# Patient Record
Sex: FEMALE | Race: BLACK OR AFRICAN AMERICAN | NOT HISPANIC OR LATINO | Employment: FULL TIME | URBAN - METROPOLITAN AREA
[De-identification: names, ages, dates, MRNs, and addresses within clinical notes are randomized per-mention and may not be internally consistent; named-entity substitution may affect disease eponyms.]

---

## 2018-02-09 ENCOUNTER — INITIAL PRENATAL (OUTPATIENT)
Dept: OBSTETRICS AND GYNECOLOGY | Facility: CLINIC | Age: 38
End: 2018-02-09
Payer: COMMERCIAL

## 2018-02-09 ENCOUNTER — LAB VISIT (OUTPATIENT)
Dept: LAB | Facility: HOSPITAL | Age: 38
End: 2018-02-09
Attending: OBSTETRICS & GYNECOLOGY
Payer: COMMERCIAL

## 2018-02-09 VITALS
BODY MASS INDEX: 38.62 KG/M2 | DIASTOLIC BLOOD PRESSURE: 90 MMHG | WEIGHT: 218 LBS | SYSTOLIC BLOOD PRESSURE: 120 MMHG | HEART RATE: 62 BPM

## 2018-02-09 DIAGNOSIS — O99.210 MATERNAL OBESITY AFFECTING PREGNANCY, ANTEPARTUM: ICD-10-CM

## 2018-02-09 DIAGNOSIS — Z34.91 PREGNANCY WITH ONE FETUS IN FIRST TRIMESTER: ICD-10-CM

## 2018-02-09 DIAGNOSIS — O10.911 MATERNAL CHRONIC HYPERTENSION IN FIRST TRIMESTER: ICD-10-CM

## 2018-02-09 DIAGNOSIS — O09.521 ELDERLY MULTIGRAVIDA IN FIRST TRIMESTER: ICD-10-CM

## 2018-02-09 DIAGNOSIS — Z34.90 EARLY STAGE OF PREGNANCY: Primary | ICD-10-CM

## 2018-02-09 DIAGNOSIS — Z12.4 CERVICAL CANCER SCREENING: ICD-10-CM

## 2018-02-09 PROBLEM — O10.919 CHRONIC HYPERTENSION COMPLICATING OR REASON FOR CARE DURING PREGNANCY: Status: ACTIVE | Noted: 2018-02-09

## 2018-02-09 PROBLEM — O09.529 AMA (ADVANCED MATERNAL AGE) MULTIGRAVIDA 35+: Status: ACTIVE | Noted: 2018-02-09

## 2018-02-09 LAB
ABO + RH BLD: NORMAL
ALBUMIN SERPL BCP-MCNC: 4.1 G/DL
ALP SERPL-CCNC: 56 U/L
ALT SERPL W/O P-5'-P-CCNC: 13 U/L
ANION GAP SERPL CALC-SCNC: 10 MMOL/L
AST SERPL-CCNC: 17 U/L
BASOPHILS # BLD AUTO: 0.02 K/UL
BASOPHILS NFR BLD: 0.2 %
BILIRUB SERPL-MCNC: 0.6 MG/DL
BLD GP AB SCN CELLS X3 SERPL QL: NORMAL
BUN SERPL-MCNC: 7 MG/DL
CALCIUM SERPL-MCNC: 10.2 MG/DL
CHLORIDE SERPL-SCNC: 102 MMOL/L
CO2 SERPL-SCNC: 25 MMOL/L
CREAT SERPL-MCNC: 0.9 MG/DL
DIFFERENTIAL METHOD: ABNORMAL
EOSINOPHIL # BLD AUTO: 0.2 K/UL
EOSINOPHIL NFR BLD: 1.3 %
ERYTHROCYTE [DISTWIDTH] IN BLOOD BY AUTOMATED COUNT: 14.9 %
EST. GFR  (AFRICAN AMERICAN): >60 ML/MIN/1.73 M^2
EST. GFR  (NON AFRICAN AMERICAN): >60 ML/MIN/1.73 M^2
ESTIMATED AVG GLUCOSE: 88 MG/DL
GLUCOSE SERPL-MCNC: 105 MG/DL
HBA1C MFR BLD HPLC: 4.7 %
HCT VFR BLD AUTO: 35.8 %
HGB BLD-MCNC: 12.5 G/DL
LYMPHOCYTES # BLD AUTO: 2 K/UL
LYMPHOCYTES NFR BLD: 17.2 %
MCH RBC QN AUTO: 29.2 PG
MCHC RBC AUTO-ENTMCNC: 34.9 G/DL
MCV RBC AUTO: 84 FL
MONOCYTES # BLD AUTO: 1.2 K/UL
MONOCYTES NFR BLD: 9.9 %
NEUTROPHILS # BLD AUTO: 8.2 K/UL
NEUTROPHILS NFR BLD: 71.2 %
PLATELET # BLD AUTO: 318 K/UL
PMV BLD AUTO: 10.1 FL
POTASSIUM SERPL-SCNC: 3.8 MMOL/L
PROT SERPL-MCNC: 8.2 G/DL
RBC # BLD AUTO: 4.28 M/UL
SODIUM SERPL-SCNC: 137 MMOL/L
WBC # BLD AUTO: 11.57 K/UL

## 2018-02-09 PROCEDURE — 99999 PR PBB SHADOW E&M-EST. PATIENT-LVL III: CPT | Mod: PBBFAC,,, | Performed by: OBSTETRICS & GYNECOLOGY

## 2018-02-09 PROCEDURE — 86703 HIV-1/HIV-2 1 RESULT ANTBDY: CPT

## 2018-02-09 PROCEDURE — 85025 COMPLETE CBC W/AUTO DIFF WBC: CPT

## 2018-02-09 PROCEDURE — 86592 SYPHILIS TEST NON-TREP QUAL: CPT

## 2018-02-09 PROCEDURE — 0500F INITIAL PRENATAL CARE VISIT: CPT | Mod: S$GLB,,, | Performed by: OBSTETRICS & GYNECOLOGY

## 2018-02-09 PROCEDURE — 88175 CYTOPATH C/V AUTO FLUID REDO: CPT

## 2018-02-09 PROCEDURE — 83036 HEMOGLOBIN GLYCOSYLATED A1C: CPT

## 2018-02-09 PROCEDURE — 87491 CHLMYD TRACH DNA AMP PROBE: CPT

## 2018-02-09 PROCEDURE — 87086 URINE CULTURE/COLONY COUNT: CPT

## 2018-02-09 PROCEDURE — 86803 HEPATITIS C AB TEST: CPT

## 2018-02-09 PROCEDURE — 36415 COLL VENOUS BLD VENIPUNCTURE: CPT

## 2018-02-09 PROCEDURE — 80053 COMPREHEN METABOLIC PANEL: CPT

## 2018-02-09 PROCEDURE — 87624 HPV HI-RISK TYP POOLED RSLT: CPT

## 2018-02-09 PROCEDURE — 87340 HEPATITIS B SURFACE AG IA: CPT

## 2018-02-09 PROCEDURE — 83020 HEMOGLOBIN ELECTROPHORESIS: CPT

## 2018-02-09 PROCEDURE — 83020 HEMOGLOBIN ELECTROPHORESIS: CPT | Mod: 91

## 2018-02-09 PROCEDURE — 86762 RUBELLA ANTIBODY: CPT

## 2018-02-09 PROCEDURE — 86850 RBC ANTIBODY SCREEN: CPT

## 2018-02-09 NOTE — PROGRESS NOTES
[facility]  Ambulatory Clinic  Obstetrics & Gynecology    Visit Date:  2018     Chief Complaint:  Establish prenatal care    Dating Criteria:     LMP:  Patient's last menstrual period was 2017.  Working ELIAZAR:  10/6/2018, by Last Menstrual Period    History of Present Illness:      nAa Wharton is a 38 y.o.  at 5w6d gestation by LMP here to establish prenatal care.     Pt has not had any prenatal care for this pregnancy.     Pt has no major complaints today and denies any vaginal bleeding, leakage of fluid, contractions, or pain.     Medical history is remarkable for chronic hypertension, dx 30 y/o, currently ACEI/HCTZ and spironolactone.      Otherwise, pt is in her usual state of health.    Past Medical History:      Chronic HTN  Sickle cell trait  Congenital absent left ovary per pt    Past Surgical History:     Laparoscopic cholecystectomy     Medications:      Prenatal vitamins    Allergies:      Dilaudid - hives      Obstetric History:      G1, current    Gynecologic History:      Denies active STI  Denies history abnormal Pap     Social History:      Denies tobacco, alcohol or illicit drug use  Current partner is father of baby  Denies domestic abuse     Family History:       Denies congenital anomalies, inherited syndromes, fetal aneuploidy    Review of Systems:      Constitutional:  No fever, fatigue  HENT:  No congestion, hearing changes  Eyes:  No visual disturbance  Respiratory:  No cough, shortness of breath  Cardiovascular:  No chest pain, leg swelling  Breast:  No lump, pain, nipple discharge, redness, skin changes  Gastrointestinal:  No abdominal pain, constipation, blood in stool   Genitourinary:  No dysuria, frequency  Endocrine:  No heat or cold intolerance  Musculoskeletal:  No back pain, arthralgias  Skin:  No rash, jaundice  Neurological:  No dizziness, weakness, headaches  Psychiatric/Behavioral:  No sleep disturbance, dysphoric mood     Physical Exam:      BP (!) 120/90   Wt 98.9 kg (218 lb)   LMP 2017   BMI 38.62 kg/m²      GENERAL:  NAD. Well-nourished. A&Ox3.  HEENT:  NCAT, EOMI, PERRLA, moist mucus membranes.  Neck supple w/o masses.  BREAST:  Symmetric, no obvious masses, adenopathy, skin changes or nipple discharge.  LUNGS:  CTA-B.   HEART:  RRR, physiologic heart sounds.  ABDOMEN:  Soft, non-tender. Normoactive BS.  No obvious organomegaly.    EXT:  Symmetric w/o cramping, claudication, or edema. +2 distal pulses. FROM.  SKIN:  No rashes or bruising.  NEURO:  CN II - XII grossly intact bilaterally. +2 DTR.  PSYCH:  Mood & affect appropriate.       PELVIC:  Female external genitalia w/o any obvious lesions.  Adequate perineal body. Normal urethral meatus. No gross lymphadenopathy.     VAGINA:  Pink, moist, well-rugated.  Good support.  No obvious lesion.  No discharge noted.     CERVIX:  No cervical motion tenderness, discharge, or obvious lesions.  Closed, thick, posterior.  UTERUS:  Small, non-tender, normal contour.  ADNEXA:  No masses or tenderness.    RECTAL:  Declined.  No obvious external lesions.   WET PREP:  Negative    Chaperone present for exam.    Assessment:     1. 38 y.o.  at 5w6d by LMP here to establish prenatal care  2. Chronic hypertension, dx 30 y/o, currently ACEI/HCTZ and spironolactone  3. Advanced maternal age  4. Maternal obesity - Body mass index is 38.62 kg/m².    Plan:    Principles of prenatal care, weight gain goals, dietary/lifestyle modifications, pregnancy care instructions and precautions were discussed in detail.  Pt was provided literature regarding pregnancy, maternity care, and childbirth.  Continue prenatal vitamins.  SAB/ectopic precautions reviewed.  Initial OB labs today, pap, GC cx today    Effect of uncontrolled HTN on her pregnancy and parameters to call reviewed. Pt was advised to stop all HTN meds at this time.  Pt was advised that ACEI is contraindicated during pregnancy.  Will start  anti-hypertensive medications if her BP exceeds (150-160) mmHg systolic or (100-110) mmHg diastolic, pt was instructed to call office or go to L&D.  BP medication of choice will be either labetalol or procardia. Monitor home BP tid, BP log given to pt.   testing with twice weekly NSTs starting at ~32 weeks until delivery. Timing of delivery will depend on maternal/fetal status and BP control.  We discussed daily low dose ASA for prevention of preE ~14 wks.     Discussed advanced maternal age on the relationship between maternal age and genetic aneuploidy.  Pt is not interested in first trimester screening, cell free DNA testing, or genetic amniocentesis.  Pt states she would not terminate the pregnancy for any reasons.  Will refer pt to [facility] as pregnancy progresses.    Discussed implications of obesity on pregnancy including but are not limited to miscarriage, poor ultrasound visualization, increase in congenital malformations (especially NTD's and cardiac anomalies),  birth, ascending infections, gestational diabetes, hypertensive diseases of pregnancy, macrosomia, shoulder dystocia, cerebral palsy and surgical complications such as wound infections, dehiscence and thrombosis.  Encourage healthy lifestyle modifications.    Return in 4 weeks, or sooner as needed.  All questions answered, pt voiced understanding.      Ramon Dai MD

## 2018-02-10 LAB — RPR SER QL: NORMAL

## 2018-02-11 LAB
BACTERIA UR CULT: NORMAL
C TRACH DNA SPEC QL NAA+PROBE: NOT DETECTED
N GONORRHOEA DNA SPEC QL NAA+PROBE: NOT DETECTED

## 2018-02-12 LAB
HBV SURFACE AG SERPL QL IA: NEGATIVE
HCV AB SERPL QL IA: NEGATIVE
HIV 1+2 AB+HIV1 P24 AG SERPL QL IA: NEGATIVE
RUBV IGG SER-ACNC: 31.8 IU/ML
RUBV IGG SER-IMP: REACTIVE

## 2018-02-15 LAB
HPV16 AG SPEC QL: NEGATIVE
HPV16+18+H RISK 12 DNA CVX-IMP: NEGATIVE
HPV18 DNA SPEC QL NAA+PROBE: NEGATIVE

## 2018-02-19 LAB
HGB A2 MFR BLD HPLC: 2.8 %
HGB FRACT BLD ELPH PH6.0-IMP: NORMAL
HGB FRACT BLD ELPH-IMP: NORMAL
HGB FRACT BLD ELPH-IMP: NORMAL

## 2018-02-20 ENCOUNTER — HOSPITAL ENCOUNTER (EMERGENCY)
Facility: HOSPITAL | Age: 38
Discharge: HOME | End: 2018-02-20
Attending: EMERGENCY MEDICINE
Payer: COMMERCIAL

## 2018-02-20 VITALS
OXYGEN SATURATION: 100 % | WEIGHT: 211 LBS | SYSTOLIC BLOOD PRESSURE: 179 MMHG | TEMPERATURE: 98 F | BODY MASS INDEX: 39.84 KG/M2 | RESPIRATION RATE: 18 BRPM | HEART RATE: 70 BPM | HEIGHT: 61 IN | DIASTOLIC BLOOD PRESSURE: 90 MMHG

## 2018-02-20 DIAGNOSIS — O00.101 RIGHT TUBAL PREGNANCY WITHOUT INTRAUTERINE PREGNANCY: Primary | ICD-10-CM

## 2018-02-20 DIAGNOSIS — N93.9 VAGINAL BLEEDING: ICD-10-CM

## 2018-02-20 DIAGNOSIS — O00.90 ECTOPIC PREGNANCY, UNSPECIFIED LOCATION, UNSPECIFIED WHETHER INTRAUTERINE PREGNANCY PRESENT: Primary | ICD-10-CM

## 2018-02-20 LAB
ABO + RH BLD: NORMAL
ALBUMIN SERPL BCP-MCNC: 3.8 G/DL
ALP SERPL-CCNC: 61 U/L
ALT SERPL W/O P-5'-P-CCNC: 17 U/L
ANION GAP SERPL CALC-SCNC: 6 MMOL/L
AST SERPL-CCNC: 19 U/L
B-HCG UR QL: POSITIVE
BACTERIA GENITAL QL WET PREP: ABNORMAL
BASOPHILS # BLD AUTO: 0.04 K/UL
BASOPHILS NFR BLD: 0.4 %
BILIRUB SERPL-MCNC: 0.5 MG/DL
BILIRUB UR QL STRIP: NEGATIVE
BUN SERPL-MCNC: 9 MG/DL
CALCIUM SERPL-MCNC: 9.3 MG/DL
CHLORIDE SERPL-SCNC: 105 MMOL/L
CLARITY UR: CLEAR
CLUE CELLS VAG QL WET PREP: ABNORMAL
CO2 SERPL-SCNC: 24 MMOL/L
COLOR UR: YELLOW
CREAT SERPL-MCNC: 0.9 MG/DL
CTP QC/QA: YES
DIFFERENTIAL METHOD: ABNORMAL
EOSINOPHIL # BLD AUTO: 0.2 K/UL
EOSINOPHIL NFR BLD: 2.1 %
ERYTHROCYTE [DISTWIDTH] IN BLOOD BY AUTOMATED COUNT: 15.3 %
EST. GFR  (AFRICAN AMERICAN): >60 ML/MIN/1.73 M^2
EST. GFR  (NON AFRICAN AMERICAN): >60 ML/MIN/1.73 M^2
FILAMENT FUNGI VAG WET PREP-#/AREA: ABNORMAL
GLUCOSE SERPL-MCNC: 101 MG/DL
GLUCOSE UR QL STRIP: NEGATIVE
HCG INTACT+B SERPL-ACNC: NORMAL MIU/ML
HCT VFR BLD AUTO: 34.4 %
HGB BLD-MCNC: 12.3 G/DL
HGB UR QL STRIP: ABNORMAL
KETONES UR QL STRIP: NEGATIVE
LEUKOCYTE ESTERASE UR QL STRIP: ABNORMAL
LYMPHOCYTES # BLD AUTO: 2.4 K/UL
LYMPHOCYTES NFR BLD: 23.6 %
MCH RBC QN AUTO: 29.6 PG
MCHC RBC AUTO-ENTMCNC: 35.8 G/DL
MCV RBC AUTO: 83 FL
MICROSCOPIC COMMENT: ABNORMAL
MONOCYTES # BLD AUTO: 1.1 K/UL
MONOCYTES NFR BLD: 10.9 %
NEUTROPHILS # BLD AUTO: 6.4 K/UL
NEUTROPHILS NFR BLD: 63 %
NITRITE UR QL STRIP: NEGATIVE
PH UR STRIP: 6 [PH] (ref 5–8)
PLATELET # BLD AUTO: 278 K/UL
PMV BLD AUTO: 9.9 FL
POTASSIUM SERPL-SCNC: 4 MMOL/L
PROT SERPL-MCNC: 8 G/DL
PROT UR QL STRIP: NEGATIVE
RBC # BLD AUTO: 4.15 M/UL
RBC #/AREA URNS HPF: 12 /HPF (ref 0–4)
SODIUM SERPL-SCNC: 135 MMOL/L
SP GR UR STRIP: 1.01 (ref 1–1.03)
SPECIMEN SOURCE: ABNORMAL
SQUAMOUS #/AREA URNS HPF: ABNORMAL /HPF
T VAGINALIS GENITAL QL WET PREP: ABNORMAL
TRICHOMONAS UR QL MICRO: ABNORMAL
URN SPEC COLLECT METH UR: ABNORMAL
UROBILINOGEN UR STRIP-ACNC: NEGATIVE EU/DL
WBC # BLD AUTO: 10.26 K/UL
WBC #/AREA URNS HPF: 50 /HPF (ref 0–5)
WBC #/AREA VAG WET PREP: ABNORMAL
YEAST GENITAL QL WET PREP: ABNORMAL

## 2018-02-20 PROCEDURE — 63600175 PHARM REV CODE 636 W HCPCS: Performed by: OBSTETRICS & GYNECOLOGY

## 2018-02-20 PROCEDURE — 87210 SMEAR WET MOUNT SALINE/INK: CPT

## 2018-02-20 PROCEDURE — 96360 HYDRATION IV INFUSION INIT: CPT

## 2018-02-20 PROCEDURE — 99285 EMERGENCY DEPT VISIT HI MDM: CPT | Mod: 25

## 2018-02-20 PROCEDURE — 99243 OFF/OP CNSLTJ NEW/EST LOW 30: CPT | Mod: ,,, | Performed by: OBSTETRICS & GYNECOLOGY

## 2018-02-20 PROCEDURE — 85025 COMPLETE CBC W/AUTO DIFF WBC: CPT

## 2018-02-20 PROCEDURE — 96361 HYDRATE IV INFUSION ADD-ON: CPT

## 2018-02-20 PROCEDURE — 81025 URINE PREGNANCY TEST: CPT | Performed by: EMERGENCY MEDICINE

## 2018-02-20 PROCEDURE — 80053 COMPREHEN METABOLIC PANEL: CPT

## 2018-02-20 PROCEDURE — 25000003 PHARM REV CODE 250: Performed by: EMERGENCY MEDICINE

## 2018-02-20 PROCEDURE — 81000 URINALYSIS NONAUTO W/SCOPE: CPT

## 2018-02-20 PROCEDURE — 84702 CHORIONIC GONADOTROPIN TEST: CPT

## 2018-02-20 PROCEDURE — 86901 BLOOD TYPING SEROLOGIC RH(D): CPT

## 2018-02-20 PROCEDURE — 87491 CHLMYD TRACH DNA AMP PROBE: CPT

## 2018-02-20 PROCEDURE — 96372 THER/PROPH/DIAG INJ SC/IM: CPT

## 2018-02-20 RX ORDER — METHOTREXATE 25 MG/ML
50 INJECTION INTRA-ARTERIAL; INTRAMUSCULAR; INTRATHECAL; INTRAVENOUS ONCE
Status: COMPLETED | OUTPATIENT
Start: 2018-02-20 | End: 2018-02-20

## 2018-02-20 RX ORDER — METRONIDAZOLE 500 MG/1
2000 TABLET ORAL
Status: COMPLETED | OUTPATIENT
Start: 2018-02-20 | End: 2018-02-20

## 2018-02-20 RX ADMIN — METHOTREXATE 100 MG: 25 INJECTION, SOLUTION INTRA-ARTERIAL; INTRAMUSCULAR; INTRAVENOUS at 03:02

## 2018-02-20 RX ADMIN — METRONIDAZOLE 2000 MG: 500 TABLET ORAL at 03:02

## 2018-02-20 RX ADMIN — SODIUM CHLORIDE 1000 ML: 0.9 INJECTION, SOLUTION INTRAVENOUS at 07:02

## 2018-02-20 NOTE — CONSULTS
"[facility]  History & Physical  Obstetrics & Gynecology       Patient Name:  Ana Wharton  MRN:  [id number]  Admission Date:  2018  Hospital Length of Stay:  0  Attending Physician:  Ramon Dai MD  Primary Care Provider:  Son JOSÉ LUIS MD Malaika    Date:  2018    Reason for Consult:  Ectopic pregnancy    Referring Physician:  Dr. Beaulieu ([facility])    History of Present Illness:      Ana Wharton is a 38 y.o.  who in early pregnancy who present to ED with light vaginal bleeding.  Pelvic ordered showed a unruptured right ectopic pregnancy at 6w0d.  Pt was seen in clinic on 2018 for initial OB visit.  Pt denies any abdominal pain.    Past Medical History:      Chronic HTN  Sickle cell trait  Congenital absent left ovary per pt     Past Surgical History:      Laparoscopic cholecystectomy      Medications:      Prenatal vitamins     Allergies:      Dilaudid - hives       Obstetric History:       G1, current     Gynecologic History:      Denies active STI  Denies history abnormal Pap     Social History:       Denies tobacco, alcohol or illicit drug use  Current partner is father of baby  Denies domestic abuse     Family History:       Denies congenital anomalies, inherited syndromes, fetal aneuploidy    Vitals:     BP (!) 179/90 (BP Location: Right arm, Patient Position: Sitting)   Pulse 70   Temp 97.7 °F (36.5 °C) (Oral)   Resp 18   Ht 5' 1" (1.549 m)   Wt 95.7 kg (211 lb)   LMP 2017   SpO2 100%   BMI 39.87 kg/m²     Consults     Review of Systems   Constitutional: Negative.    HENT: Negative.    Eyes: Negative.    Respiratory: Negative.    Cardiovascular: Negative.    Gastrointestinal: Negative.    Endocrine: Negative.    Genitourinary: Negative.    Musculoskeletal: Negative.    Skin:  Negative.   Neurological: Negative.    Hematological: Negative.    Psychiatric/Behavioral: Negative.    Breast: negative.       Physical Exam:   Constitutional: No distress.      "                        Alert and oriented to person, place and time.  HEENT:  Normocephalic, atraumatic, anicteric, EOMI, moist mucus membranes.  Neck supple without masses.  LUNGS:  Clear to auscultation bilaterally.  HEART:  Regular rate & rhythm with physiologic heart sounds.  ABDOMEN:  Soft, non tender without any guarding, rigidity or rebound. Normoactive bowel sounds.  PELVIC:  Normal female external genitalia without gross lesions, rashes or excoriations. No gross vaginal or cervical lesions.  Adequate pelvis.  Cervix:  Cervix closed, thick, high, posterior.  T  EXTREMITIES:  Symmetric without cramping, claudication. Trace edema LE. +2 distal pulses.  Full range of motion.  SKIN:  No rashes, good turgor & capillary refill.  NEUROLOGIC:  Grossly intact bilaterally. +2 DTR, symmetric.  PSYCH:  Mood & affect appropriate.       Labs:  Recent Results (from the past 336 hour(s))   CBC W/ AUTO DIFFERENTIAL    Collection Time: 02/20/18  7:41 AM   Result Value Ref Range    WBC 10.26 3.90 - 12.70 K/uL    Hemoglobin 12.3 12.0 - 16.0 g/dL    Hematocrit 34.4 (L) 37.0 - 48.5 %    Platelets 278 150 - 350 K/uL   CBC auto differential    Collection Time: 02/09/18 10:54 AM   Result Value Ref Range    WBC 11.57 3.90 - 12.70 K/uL    Hemoglobin 12.5 12.0 - 16.0 g/dL    Hematocrit 35.8 (L) 37.0 - 48.5 %    Platelets 318 150 - 350 K/uL     BMP  Lab Results   Component Value Date     (L) 02/20/2018    K 4.0 02/20/2018     02/20/2018    CO2 24 02/20/2018    BUN 9 02/20/2018    CREATININE 0.9 02/20/2018    CALCIUM 9.3 02/20/2018    ANIONGAP 6 (L) 02/20/2018    ESTGFRAFRICA >60 02/20/2018    EGFRNONAA >60 02/20/2018     Lab Results   Component Value Date    ALT 17 02/20/2018    AST 19 02/20/2018    ALKPHOS 61 02/20/2018    BILITOT 0.5 02/20/2018     HCG   Component      Latest Ref Rng & Units 2/20/2018   hCG Quant      See Text mIU/mL [id number]     Ultrasound:  2/20/18    Results: Transabdominal followed by a transvaginal  ultrasound of the pelvis obtained.  The uterus measures 8.6 x 5.9 x 5.5 cm.  No intrauterine gestational sac seen.  There is a small gestational sac within the right adnexa with a yolk sac and a fetal pole, fetal heart rate of 136 beats/ minute.  Crown-rump length measuring 3.2 mm corresponding to 6 weeks and 0 day pregnancy.  Mean sac diameter 1.24 cm.  This is an ectopic pregnancy.  The right ovary is well-seen and separate from the gestational sac, it measures 4.1 x 4.2 x 3.2 cm with a small corpus luteal cyst seen.  The left ovary is not seen. There is flow to the right ovary.  No free fluid.    Assessment:     1. 38 y.o.  with right ectopic pregnancy at 6w0d  2. Hemodynamically stable, no evidence of ruptured ectopic  3. Pt desires conservative medical management with methotrexate  4. Congenital absent left ovary per pt  5. Trichomonal vaginalis  6. Chronic HTN    Plan:    I d/w with pt her right ectopic pregnancy in great detail including various mgt options (medical mgt with methotrexate versus surgical mgt with laparoscopy).  After an extensive discussion, pt request conservative medical with methotrexate due to her h/o congenital absent left ovary.  Pt was clearly informed that surgical mgt with laparoscopy (salpingostomy vs salpingectomy) may be needed if medical mgt with methotrexate is unsuccessful or if pt develops si/sxs of ruptured ectopic pregnancy.  Risks, benefits, and alternatives to methotrexate reviewed.  Pt was given clear ectopic pregnancy precautions and parameters to return to ER if she develops abdominal pain.  Importance of timely outpt follow up with serial HCG +/- additional treatment with methotrexate discussed.  A repeat HCG level will drawn on day 4 after initiation of methotrexate.  If the HCG level does not decrease by 15%, a second dose of methotrexate will be given.  I d/w pt the need for surgical mgt at any time if pt develops sxs concerning for ruptured ectopic pregnancy or  if pt is no longer a candidate for outpt medical mgt.  Pelvic rest.  Importance f/u strongly advised.    Flagyl 2000 mg po x 1 for trichomonas.  No alcohol while on antibx.  Hygiene advice.  Pt advised to inform partner(s) to see STI testing and treatment.     Pt was advised to resume her home BP regimen.  HTN precautions reviewed.      All questions answered, voiced understanding.      Ramon Dai MD

## 2018-02-20 NOTE — ED PROVIDER NOTES
"Encounter Date: 2/20/2018    SCRIBE #1 NOTE: I, Juanito Mart, am scribing for, and in the presence of,  Shu Iqbal PA-C. I have scribed the following portions of the note - Other sections scribed: HPI and ROS.       History     Chief Complaint   Patient presents with    Vaginal Bleeding     pt reports being 4 weeks pregnant and when she wiped after using the restroom the past 2 mornings she had "alot" of blood; pt denies soaking through pads and just reports that it was bloody only when she wiped;     CC: Vaginal Bleeding    HPI: Patient is a 38 y.o. gravid female with history of HTN who presents to ED c/o vaginal bleeding with associated nausea. Patient reports that she detected blood in her urine and on the tissue when she used the bathroom this morning. The blood was bright red. No clots reported. This is the patient's first pregnancy. Denies abdominal pain, chest pain, and dysuria. No further complaints.    Patient's OB/GYN is Dr. Ramon Dai.      The history is provided by the patient. No  was used.     Review of patient's allergies indicates:   Allergen Reactions    Dilaudid [hydromorphone (bulk)] Hives     Past Medical History:   Diagnosis Date    Hypertension      Past Surgical History:   Procedure Laterality Date    CHOLECYSTECTOMY      GALLBLADDER SURGERY       Family History   Problem Relation Age of Onset    Heart disease Mother     Diabetes Mother     Kidney disease Mother      Social History   Substance Use Topics    Smoking status: Never Smoker    Smokeless tobacco: Never Used    Alcohol use No      Comment: socially     Review of Systems   Constitutional: Negative for diaphoresis and fever.   HENT: Negative for sore throat.    Eyes: Negative for visual disturbance.   Respiratory: Negative for shortness of breath.    Cardiovascular: Negative for chest pain.   Gastrointestinal: Positive for nausea. Negative for abdominal pain, diarrhea and vomiting. "   Genitourinary: Positive for vaginal bleeding. Negative for dysuria.   Musculoskeletal: Negative for back pain.   Skin: Negative for rash.   Neurological: Negative for weakness and headaches.   Hematological: Does not bruise/bleed easily.       Physical Exam     Initial Vitals [02/20/18 0649]   BP Pulse Resp Temp SpO2   (!) 132/92 78 16 98.3 °F (36.8 °C) 100 %      MAP       105.33         Physical Exam    Nursing note and vitals reviewed.  Constitutional: She appears well-developed and well-nourished. She is not diaphoretic. No distress.   HENT:   Head: Normocephalic and atraumatic.   Nose: Nose normal.   Eyes: EOM are normal. Pupils are equal, round, and reactive to light.   Neck: Normal range of motion. Neck supple.   Cardiovascular: Normal rate and regular rhythm.   No murmur heard.  Pulmonary/Chest: Breath sounds normal. No respiratory distress. She has no wheezes. She has no rhonchi. She has no rales.   Abdominal: Soft. Bowel sounds are normal. She exhibits no distension. There is no tenderness. There is no rebound and no guarding.   Genitourinary: Vagina normal.   Genitourinary Comments: The cervix is closed.  There is a scant amount of blood on the specimen q-tip.  Otherwise, no blood noted in the vaginal vault.  No external genital lesions noted.  There is no cervical motion or adnexal tenderness to palpation.   Musculoskeletal: Normal range of motion.   Neurological: She is alert and oriented to person, place, and time. No cranial nerve deficit.   Skin: Skin is warm. No rash noted. No erythema.         ED Course   Procedures  Labs Reviewed   CBC W/ AUTO DIFFERENTIAL - Abnormal; Notable for the following:        Result Value    Hematocrit 34.4 (*)     RDW 15.3 (*)     Mono # 1.1 (*)     All other components within normal limits   COMPREHENSIVE METABOLIC PANEL - Abnormal; Notable for the following:     Sodium 135 (*)     Anion Gap 6 (*)     All other components within normal limits   URINALYSIS - Abnormal;  Notable for the following:     Occult Blood UA 3+ (*)     Leukocytes, UA 3+ (*)     All other components within normal limits   VAGINAL SCREEN - Abnormal; Notable for the following:     Trichomonas Moderate (*)     Clue Cells, Wet Prep Few (*)     WBC - Vaginal Screen Few (*)     All other components within normal limits   URINALYSIS MICROSCOPIC - Abnormal; Notable for the following:     RBC, UA 12 (*)     WBC, UA 50 (*)     Trichomonas, UA Few (*)     All other components within normal limits   POCT URINE PREGNANCY - Abnormal; Notable for the following:     POC Preg Test, Ur Positive (*)     All other components within normal limits   C. TRACHOMATIS/N. GONORRHOEAE BY AMP DNA   HCG, QUANTITATIVE, PREGNANCY   GROUP & RH          X-Rays:   Independently Interpreted Readings:   Other Readings:  Transvaginal ultrasound revealed a right adnexal ectopic pregnancy.    Medical Decision Making:   Differential Diagnosis:   This is an emergent evaluation of a 38-year-old female presents to the emergency department complaining of vaginal bleeding.  She states she is currently pregnant.    The patient is currently afebrile and nontoxic in appearance.  Her vital signs are stable.  On physical exam, there is no abdominal tenderness that could be elicited.  There is no rebound or guarding.  The patient appears comfortable.  On pelvic exam, the cervix is closed.  There is a scant amount of blood in the vaginal vault.  Otherwise there is no active bleeding noted from the cervical os.  There is no external genital lesions noted.  There is no cervical motion or adnexal tenderness to palpation.    An IV was started and fluids are given.  Blood was drawn and urine was collected.  A CBC is without leukocytosis or anemia.  CMP is unremarkable.  Beta hCG was noted to be 13,000.  The patient is O+.  RhoGAM is not needed this time.  Urine pregnancy test was positive and rapid urinalysis was remarkable for 3+ leukocytes.  Microscopic UA  revealed few Trichomonas.  Wet prep also reviewed moderate Trichomonas.  Gen-Probe for gonorrhea and chlamydia was sent and is pending.  A transvaginal ultrasound was performed which revealed a right adnexal ectopic pregnancy.  No rupture was noted.  At that time, Dr. Dai was contacted.  He came to the emergency department to see the patient and administered methotrexate.  She was also given Flagyl 2 g in the emergency department for trichomonas.  She will need to follow-up with Dr. Dai in one week for more medication and follow-up.  Signs and symptoms of worsening were thoroughly reviewed with her and she verbalized understanding and agreement.  She is currently safe and stable for discharge at this time.  I carefully considered but doubt ovarian torsion, tubo-ovarian abscess.  This case was discussed with Dr. Beaulieu and he is in agreement with the assessment and treatment plan.            Scribe Attestation:   Scribe #1: I performed the above scribed service and the documentation accurately describes the services I performed. I attest to the accuracy of the note.    Attending Attestation:           Physician Attestation for Scribe:  Physician Attestation Statement for Scribe #1: I, Shu Iqbal PA-C, reviewed documentation, as scribed by Juanito Mart in my presence, and it is both accurate and complete.                    Clinical Impression:   The primary encounter diagnosis was Ectopic pregnancy, unspecified location, unspecified whether intrauterine pregnancy present. A diagnosis of Vaginal bleeding was also pertinent to this visit.    Disposition:   Disposition: Discharged  Condition: Stable                        Shu Iqbal PA-C  02/20/18 1803       Shu Iqbal PA-C  02/20/18 1803

## 2018-02-20 NOTE — DISCHARGE INSTRUCTIONS
Methotrexate to Treat an Ectopic Pregnancy  An ectopic pregnancy is when a fertilized egg implants outside the womb (uterus). In most cases, it implants in a fallopian tube. This is a tube that goes from the uterus to an ovary. When this happens, the embryo cant grow normally. In some cases, it may stop growing quickly. Or it may grow until the fallopian tube tears (ruptures). This can cause severe bleeding and a risk for death for the mother.  Methotrexate is a medicine that stops the embryo from growing. The tissue is then absorbed by the mothers body. This treatment can prevent the rupture, bleeding, and risk of death to the mother. Methotrexate is often used instead of surgery to remove the fetus. Surgery has risks, like bleeding, infection, scarring of the fallopian tube, infertility, and the risks of anesthesia.  Having methotrexate treatment  Methotrexate is most often given by a shot (injection) into a muscle. It can also be given through an IV.  After having the shot, you may have:  · Mild abdominal pain or cramping  · Nausea and vomiting  · Diarrhea  · Fatigue  Care at home  Once you are home, you can resume normal activities as you are able. You will have some bleeding and pain. While you are recovering, you can use acetaminophen for pain if advised by your healthcare provider.  Until your healthcare provider says its OK, do NOT:  · Take anti-inflammatory medicines (NSAIDs), like aspirin, ibuprofen, or naproxen  · Have foods or vitamins that contain folic acid or folate (for example, prenatal vitamins have folate)  · Drink alcohol  · Take penicillin or certain other antibiotics  · Use tampons or douche  · Have sexual intercourse  Make sure to:  · Avoid the sun during the first week after your shot. Sun can cause a rash during this time.  · Use birth control for at least 3 months after treatment.  · Talk with a counselor if you feel sadness or grief after pregnancy loss.  Follow up  You will have  blood tests several times in the weeks after you have the shot. This is to make sure that your pregnancy hormone (HCG) level is getting lower. This shows that the fetus is no longer growing. It may take up to 4 weeks for your level to drop to zero. Most women need only one shot. If HCG levels are not low enough, your healthcare provider may give you a second shot. In some cases, this treatment does not work and surgery is needed. Your healthcare provider can tell you more about surgery for ectopic pregnancy.     When to call the healthcare provider  Call your healthcare provider if you have:  · Lower abdominal pain that gets worse or doesnt go away  · Heavy vaginal bleeding  · Nausea or vomiting that needs treatment  · Dizziness, weakness, or fainting  · Fever of 100.4°F (38°C) or higher   Date Last Reviewed: 6/1/2016 © 2000-2017 [employer]. [address] [place] [address]. All rights reserved. This information is not intended as a substitute for professional medical care. Always follow your healthcare professional's instructions.        Abdominal Pain and Early Pregnancy    (To rule out ectopic pregnancy or miscarriage)  Our tests show that you are pregnant, but the exact cause of your pain isnt clear.  Some pain and bleeding are common early in pregnancy. Often they stop, and you can go on to have a normal pregnancy and baby. Other times the pain or bleeding can be signs of a miscarriage or ectopic pregnancy. An ectopic pregnancy is a very serious problem. At this time it is unclear if your pregnancy will continue normally, if you will have a miscarriage, or if you could have an ectopic pregnancy. Below is some information about this.  Miscarriage  At this time we dont know whether you will have a miscarriage, or if things will clear up and your pregnancy will continue normally. We understand that this is emotionally difficult. There is little we can say to change the way you feel.  But understand that miscarriages are common.  About 1 or 2 out of every 10 pregnancies end this way. Some end even before you know you are pregnant. This happens for a number of reasons, and usually we never figure out why. Its important you know that it is not your fault. It didnt happen because you did anything wrong.  Having sex or exercising does not cause a miscarriage. These activities are usually safe unless you have pain or bleeding or your doctor tells you to stop. Even minor falls wont cause a miscarriage. Miscarriages happen because things were not developing as they were supposed to. No medicine can prevent a miscarriage.  Ectopic pregnancy  In a normal pregnancy, the fertilized egg attaches to the wall of the womb (uterus). In an ectopic or tubal pregnancy, the fertilized egg attaches outside the uterus, usually in the fallopian tube. Very rarely, the egg attaches to an ovary or somewhere else in the abdomen. An ectopic pregnancy is much less common than a miscarriage, but it is very serious. The baby cannot survive, and as it grows it can rupture the tube. This can cause internal bleeding and even death. Risk factors for an ectopic are:  · An ectopic pregnancy in the past  · Pelvic inflammatory disease, or PID  · Endometriosis  · Smoking  · An IUD  Additional tests  Because we dont know whats causing your symptoms, you will need more tests to figure out what the problem is. You may need:  Ultrasound  An ultrasound can usually find a normal pregnancy as early as 4 to 5 weeks along. If the ultrasound does not show the baby inside the uterus, it means that:  · You have a normal pregnancy less than 4 weeks along, or  · You are having or recently had a miscarriage, or  · You have an ectopic pregnancy  Quantitative HCG  This test measures the amount of a pregnancy hormone in your blood. Comparing today's test result to a repeat test in 2 days will show whether you have a normal  pregnancy.  Laparoscopy  This is a type of surgery. The healthcare provider will put a tube with a light inside your belly (abdomen) to look directly at your pelvic organs. This test is used when it is not safe to wait 2 days for blood test results.  Important information  If you do have an ectopic pregnancy, there is a small chance that the growing fetus can tear the fallopian tube. This can cause severe internal bleeding. If this happens, you may have:  · Sudden severe pain in your lower abdomen  · Vaginal bleeding  · Weakness, dizziness, and sometimes fainting  If any of these symptoms occur:  · Call 911 or return immediately to the hospital.  · Do not drive yourself.  · Do not go to your healthcare provider's office or to a clinic. Go to the hospital.   Home care  Follow these guidelines to help care for yourself at home:  · Rest until your next exam. Dont do anything strenuous.  · Eat a light diet with foods that are easy to digest.  · Dont have sex until your healthcare provider says its OK.  Follow-up care  Follow up with your healthcare provider, or as advised. If you were told to have a repeat blood test in 2 days, its important to get it done.  If you had an X-ray or ultrasound, a radiologist will review it. You will be told of any new findings that may affect your care.  Call 911  Call 911 if you have any of these:  · Severe pain and very heavy bleeding  · Severe lightheadedness, passing out, or fainting  · Rapid heart rate  · Trouble breathing  · Confused or difficulty waking up  When to seek medical advice  Call your healthcare provider right away if any of these occur:  · The pain in your abdomen gets worse, either suddenly or gradually.  · You are dizzy or weak when you stand.  · You have heavy vaginal bleeding. This means soaking 1 pad an hour for 3 hours.  · You have vaginal bleeding for more than 5 days.  · You have repeated vomiting or diarrhea.  · The pain in your abdomen moves to the lower  right.  · You have blood in your vomit or bowel movements. This will be dark red or black.  · You have a fever of 100.4ºF (38ºC) or higher, or as directed by your healthcare provider  Date Last Reviewed: 10/1/2016  © 5072-7807 [employer]. [address] [place] [id number]. All rights reserved. This information is not intended as a substitute for professional medical care. Always follow your healthcare professional's instructions.

## 2018-02-20 NOTE — ED TRIAGE NOTES
Pt reports sheis 4 weeks pregnant with c/o vaginal bleeding x 4 days. Pt denies dysuria, denies abdominal pain.

## 2018-02-21 ENCOUNTER — TELEPHONE (OUTPATIENT)
Dept: OBSTETRICS AND GYNECOLOGY | Facility: CLINIC | Age: 38
End: 2018-02-21

## 2018-02-21 LAB
C TRACH DNA SPEC QL NAA+PROBE: NOT DETECTED
N GONORRHOEA DNA SPEC QL NAA+PROBE: NOT DETECTED

## 2018-02-21 NOTE — TELEPHONE ENCOUNTER
----- Message from Richelle Ash sent at 2/21/2018 11:52 AM CST -----  Contact: self  Patient states she was advised to come in Friday 2/23/18 at 1:00pm to check hormone levels after miscarriage, but there are no appointments that day. She can be reached at [phone]. Thank you!

## 2018-02-21 NOTE — TELEPHONE ENCOUNTER
LM on pt Vm to call the office regarding hormone levels.    *Order was put in for her HCG levels and she need to go to the hospital lab on tomorrow at anytime to get it drawn. Will follow up with Dr. Dai when she need to be seen.* kt

## 2018-02-23 ENCOUNTER — ANESTHESIA (OUTPATIENT)
Dept: SURGERY | Facility: HOSPITAL | Age: 38
End: 2018-02-23
Payer: COMMERCIAL

## 2018-02-23 ENCOUNTER — LAB VISIT (OUTPATIENT)
Dept: LAB | Facility: HOSPITAL | Age: 38
End: 2018-02-23
Attending: OBSTETRICS & GYNECOLOGY
Payer: COMMERCIAL

## 2018-02-23 ENCOUNTER — OFFICE VISIT (OUTPATIENT)
Dept: OBSTETRICS AND GYNECOLOGY | Facility: CLINIC | Age: 38
End: 2018-02-23
Payer: COMMERCIAL

## 2018-02-23 ENCOUNTER — ANESTHESIA EVENT (OUTPATIENT)
Dept: SURGERY | Facility: HOSPITAL | Age: 38
End: 2018-02-23
Payer: COMMERCIAL

## 2018-02-23 ENCOUNTER — HOSPITAL ENCOUNTER (OUTPATIENT)
Facility: HOSPITAL | Age: 38
Discharge: HOME | End: 2018-02-24
Attending: EMERGENCY MEDICINE | Admitting: OBSTETRICS & GYNECOLOGY
Payer: COMMERCIAL

## 2018-02-23 ENCOUNTER — SURGERY (OUTPATIENT)
Age: 38
End: 2018-02-23

## 2018-02-23 VITALS
HEIGHT: 61 IN | WEIGHT: 218.25 LBS | BODY MASS INDEX: 41.21 KG/M2 | DIASTOLIC BLOOD PRESSURE: 82 MMHG | SYSTOLIC BLOOD PRESSURE: 130 MMHG

## 2018-02-23 DIAGNOSIS — Z90.79 STATUS POST BILATERAL SALPINGECTOMY: Primary | ICD-10-CM

## 2018-02-23 DIAGNOSIS — O00.101 RIGHT TUBAL PREGNANCY WITHOUT INTRAUTERINE PREGNANCY: ICD-10-CM

## 2018-02-23 DIAGNOSIS — O00.101 RIGHT TUBAL PREGNANCY WITHOUT INTRAUTERINE PREGNANCY: Primary | ICD-10-CM

## 2018-02-23 DIAGNOSIS — O00.00 ABDOMINAL PREGNANCY WITHOUT INTRAUTERINE PREGNANCY: ICD-10-CM

## 2018-02-23 DIAGNOSIS — O00.109 ECTOPIC PREGNANCY, TUBAL: ICD-10-CM

## 2018-02-23 LAB
ABO + RH BLD: NORMAL
ALBUMIN SERPL BCP-MCNC: 3.5 G/DL
ALP SERPL-CCNC: 58 U/L
ALT SERPL W/O P-5'-P-CCNC: 17 U/L
ANION GAP SERPL CALC-SCNC: 2 MMOL/L
AST SERPL-CCNC: 14 U/L
BASOPHILS # BLD AUTO: 0.01 K/UL
BASOPHILS NFR BLD: 0.1 %
BILIRUB SERPL-MCNC: 0.3 MG/DL
BLD GP AB SCN CELLS X3 SERPL QL: NORMAL
BUN SERPL-MCNC: 8 MG/DL
CALCIUM SERPL-MCNC: 9.2 MG/DL
CHLORIDE SERPL-SCNC: 106 MMOL/L
CO2 SERPL-SCNC: 28 MMOL/L
CREAT SERPL-MCNC: 0.8 MG/DL
DIFFERENTIAL METHOD: ABNORMAL
EOSINOPHIL # BLD AUTO: 0.2 K/UL
EOSINOPHIL NFR BLD: 1.6 %
ERYTHROCYTE [DISTWIDTH] IN BLOOD BY AUTOMATED COUNT: 14.9 %
EST. GFR  (AFRICAN AMERICAN): >60 ML/MIN/1.73 M^2
EST. GFR  (NON AFRICAN AMERICAN): >60 ML/MIN/1.73 M^2
GLUCOSE SERPL-MCNC: 129 MG/DL
HCG INTACT+B SERPL-ACNC: NORMAL MIU/ML
HCT VFR BLD AUTO: 33.9 %
HGB BLD-MCNC: 11.5 G/DL
LYMPHOCYTES # BLD AUTO: 2 K/UL
LYMPHOCYTES NFR BLD: 19.9 %
MCH RBC QN AUTO: 29.1 PG
MCHC RBC AUTO-ENTMCNC: 33.9 G/DL
MCV RBC AUTO: 86 FL
MONOCYTES # BLD AUTO: 0.7 K/UL
MONOCYTES NFR BLD: 6.6 %
NEUTROPHILS # BLD AUTO: 7.1 K/UL
NEUTROPHILS NFR BLD: 71.5 %
PLATELET # BLD AUTO: 322 K/UL
PMV BLD AUTO: 9.6 FL
POTASSIUM SERPL-SCNC: 4.1 MMOL/L
PROT SERPL-MCNC: 6.9 G/DL
RBC # BLD AUTO: 3.95 M/UL
SODIUM SERPL-SCNC: 136 MMOL/L
WBC # BLD AUTO: 9.9 K/UL

## 2018-02-23 PROCEDURE — 36415 COLL VENOUS BLD VENIPUNCTURE: CPT

## 2018-02-23 PROCEDURE — G0378 HOSPITAL OBSERVATION PER HR: HCPCS

## 2018-02-23 PROCEDURE — 63600175 PHARM REV CODE 636 W HCPCS: Performed by: ANESTHESIOLOGY

## 2018-02-23 PROCEDURE — 88305 TISSUE EXAM BY PATHOLOGIST: CPT | Mod: 26,,, | Performed by: PATHOLOGY

## 2018-02-23 PROCEDURE — D9220A PRA ANESTHESIA: Mod: CRNA,,, | Performed by: NURSE ANESTHETIST, CERTIFIED REGISTERED

## 2018-02-23 PROCEDURE — 88305 TISSUE EXAM BY PATHOLOGIST: CPT | Performed by: PATHOLOGY

## 2018-02-23 PROCEDURE — 3008F BODY MASS INDEX DOCD: CPT | Mod: S$GLB,,, | Performed by: OBSTETRICS & GYNECOLOGY

## 2018-02-23 PROCEDURE — 80053 COMPREHEN METABOLIC PANEL: CPT

## 2018-02-23 PROCEDURE — 99214 OFFICE O/P EST MOD 30 MIN: CPT | Mod: S$GLB,,, | Performed by: OBSTETRICS & GYNECOLOGY

## 2018-02-23 PROCEDURE — 99284 EMERGENCY DEPT VISIT MOD MDM: CPT

## 2018-02-23 PROCEDURE — 37000009 HC ANESTHESIA EA ADD 15 MINS: Performed by: OBSTETRICS & GYNECOLOGY

## 2018-02-23 PROCEDURE — 71000033 HC RECOVERY, INTIAL HOUR: Performed by: OBSTETRICS & GYNECOLOGY

## 2018-02-23 PROCEDURE — 25000003 PHARM REV CODE 250: Performed by: OBSTETRICS & GYNECOLOGY

## 2018-02-23 PROCEDURE — 63600175 PHARM REV CODE 636 W HCPCS: Performed by: OBSTETRICS & GYNECOLOGY

## 2018-02-23 PROCEDURE — 36000709 HC OR TIME LEV III EA ADD 15 MIN: Performed by: OBSTETRICS & GYNECOLOGY

## 2018-02-23 PROCEDURE — 99999 PR PBB SHADOW E&M-EST. PATIENT-LVL II: CPT | Mod: PBBFAC,,, | Performed by: OBSTETRICS & GYNECOLOGY

## 2018-02-23 PROCEDURE — 85025 COMPLETE CBC W/AUTO DIFF WBC: CPT

## 2018-02-23 PROCEDURE — 37000008 HC ANESTHESIA 1ST 15 MINUTES: Performed by: OBSTETRICS & GYNECOLOGY

## 2018-02-23 PROCEDURE — 59151 TREAT ECTOPIC PREGNANCY: CPT | Mod: RT,,, | Performed by: OBSTETRICS & GYNECOLOGY

## 2018-02-23 PROCEDURE — 25000003 PHARM REV CODE 250: Performed by: NURSE ANESTHETIST, CERTIFIED REGISTERED

## 2018-02-23 PROCEDURE — 86901 BLOOD TYPING SEROLOGIC RH(D): CPT

## 2018-02-23 PROCEDURE — 27201423 OPTIME MED/SURG SUP & DEVICES STERILE SUPPLY: Performed by: OBSTETRICS & GYNECOLOGY

## 2018-02-23 PROCEDURE — 36000708 HC OR TIME LEV III 1ST 15 MIN: Performed by: OBSTETRICS & GYNECOLOGY

## 2018-02-23 PROCEDURE — 63600175 PHARM REV CODE 636 W HCPCS: Performed by: NURSE ANESTHETIST, CERTIFIED REGISTERED

## 2018-02-23 PROCEDURE — 84702 CHORIONIC GONADOTROPIN TEST: CPT

## 2018-02-23 PROCEDURE — D9220A PRA ANESTHESIA: Mod: ANES,,, | Performed by: ANESTHESIOLOGY

## 2018-02-23 RX ORDER — FENTANYL CITRATE 50 UG/ML
25 INJECTION, SOLUTION INTRAMUSCULAR; INTRAVENOUS EVERY 5 MIN PRN
Status: DISCONTINUED | OUTPATIENT
Start: 2018-02-23 | End: 2018-02-23 | Stop reason: HOSPADM

## 2018-02-23 RX ORDER — HYDROCHLOROTHIAZIDE 25 MG/1
25 TABLET ORAL DAILY
Status: DISCONTINUED | OUTPATIENT
Start: 2018-02-24 | End: 2018-02-24 | Stop reason: HOSPADM

## 2018-02-23 RX ORDER — CEFAZOLIN SODIUM 2 G/50ML
2 SOLUTION INTRAVENOUS
Status: DISCONTINUED | OUTPATIENT
Start: 2018-02-23 | End: 2018-02-23

## 2018-02-23 RX ORDER — SODIUM CHLORIDE, SODIUM LACTATE, POTASSIUM CHLORIDE, CALCIUM CHLORIDE 600; 310; 30; 20 MG/100ML; MG/100ML; MG/100ML; MG/100ML
INJECTION, SOLUTION INTRAVENOUS CONTINUOUS
Status: DISCONTINUED | OUTPATIENT
Start: 2018-02-23 | End: 2018-02-24 | Stop reason: HOSPADM

## 2018-02-23 RX ORDER — FENTANYL CITRATE 50 UG/ML
INJECTION, SOLUTION INTRAMUSCULAR; INTRAVENOUS
Status: DISCONTINUED | OUTPATIENT
Start: 2018-02-23 | End: 2018-02-23

## 2018-02-23 RX ORDER — MIDAZOLAM HYDROCHLORIDE 1 MG/ML
INJECTION, SOLUTION INTRAMUSCULAR; INTRAVENOUS
Status: DISCONTINUED | OUTPATIENT
Start: 2018-02-23 | End: 2018-02-23

## 2018-02-23 RX ORDER — IBUPROFEN 600 MG/1
600 TABLET ORAL EVERY 6 HOURS PRN
Status: DISCONTINUED | OUTPATIENT
Start: 2018-02-23 | End: 2018-02-24 | Stop reason: HOSPADM

## 2018-02-23 RX ORDER — SODIUM CHLORIDE, SODIUM LACTATE, POTASSIUM CHLORIDE, CALCIUM CHLORIDE 600; 310; 30; 20 MG/100ML; MG/100ML; MG/100ML; MG/100ML
INJECTION, SOLUTION INTRAVENOUS CONTINUOUS PRN
Status: DISCONTINUED | OUTPATIENT
Start: 2018-02-23 | End: 2018-02-23

## 2018-02-23 RX ORDER — NEOSTIGMINE METHYLSULFATE 1 MG/ML
INJECTION, SOLUTION INTRAVENOUS
Status: DISCONTINUED | OUTPATIENT
Start: 2018-02-23 | End: 2018-02-23

## 2018-02-23 RX ORDER — METOCLOPRAMIDE HYDROCHLORIDE 5 MG/ML
10 INJECTION INTRAMUSCULAR; INTRAVENOUS EVERY 10 MIN PRN
Status: COMPLETED | OUTPATIENT
Start: 2018-02-23 | End: 2018-02-23

## 2018-02-23 RX ORDER — OXYCODONE AND ACETAMINOPHEN 5; 325 MG/1; MG/1
1 TABLET ORAL EVERY 4 HOURS PRN
Status: DISCONTINUED | OUTPATIENT
Start: 2018-02-23 | End: 2018-02-24 | Stop reason: HOSPADM

## 2018-02-23 RX ORDER — ROCURONIUM BROMIDE 10 MG/ML
INJECTION, SOLUTION INTRAVENOUS
Status: DISCONTINUED | OUTPATIENT
Start: 2018-02-23 | End: 2018-02-23

## 2018-02-23 RX ORDER — LIDOCAINE HCL/PF 100 MG/5ML
SYRINGE (ML) INTRAVENOUS
Status: DISCONTINUED | OUTPATIENT
Start: 2018-02-23 | End: 2018-02-23

## 2018-02-23 RX ORDER — OXYCODONE AND ACETAMINOPHEN 10; 325 MG/1; MG/1
1 TABLET ORAL EVERY 4 HOURS PRN
Status: DISCONTINUED | OUTPATIENT
Start: 2018-02-23 | End: 2018-02-24 | Stop reason: HOSPADM

## 2018-02-23 RX ORDER — MEPERIDINE HYDROCHLORIDE 50 MG/ML
12.5 INJECTION INTRAMUSCULAR; INTRAVENOUS; SUBCUTANEOUS ONCE AS NEEDED
Status: DISCONTINUED | OUTPATIENT
Start: 2018-02-23 | End: 2018-02-23 | Stop reason: HOSPADM

## 2018-02-23 RX ORDER — CEFAZOLIN SODIUM 1 G/3ML
INJECTION, POWDER, FOR SOLUTION INTRAMUSCULAR; INTRAVENOUS
Status: DISCONTINUED | OUTPATIENT
Start: 2018-02-23 | End: 2018-02-23

## 2018-02-23 RX ORDER — IBUPROFEN 600 MG/1
600 TABLET ORAL EVERY 6 HOURS PRN
Qty: 60 TABLET | Refills: 2 | Status: SHIPPED | OUTPATIENT
Start: 2018-02-23 | End: 2019-02-23

## 2018-02-23 RX ORDER — DIPHENHYDRAMINE HCL 25 MG
25 CAPSULE ORAL EVERY 4 HOURS PRN
Status: DISCONTINUED | OUTPATIENT
Start: 2018-02-23 | End: 2018-02-24 | Stop reason: HOSPADM

## 2018-02-23 RX ORDER — KETOROLAC TROMETHAMINE 30 MG/ML
30 INJECTION, SOLUTION INTRAMUSCULAR; INTRAVENOUS ONCE
Status: COMPLETED | OUTPATIENT
Start: 2018-02-23 | End: 2018-02-23

## 2018-02-23 RX ORDER — GLYCOPYRROLATE 0.2 MG/ML
INJECTION INTRAMUSCULAR; INTRAVENOUS
Status: DISCONTINUED | OUTPATIENT
Start: 2018-02-23 | End: 2018-02-23

## 2018-02-23 RX ORDER — ACETAMINOPHEN 10 MG/ML
1000 INJECTION, SOLUTION INTRAVENOUS ONCE
Status: COMPLETED | OUTPATIENT
Start: 2018-02-23 | End: 2018-02-23

## 2018-02-23 RX ORDER — PROPOFOL 10 MG/ML
VIAL (ML) INTRAVENOUS
Status: DISCONTINUED | OUTPATIENT
Start: 2018-02-23 | End: 2018-02-23

## 2018-02-23 RX ORDER — HYDROMORPHONE HYDROCHLORIDE 2 MG/ML
0.2 INJECTION, SOLUTION INTRAMUSCULAR; INTRAVENOUS; SUBCUTANEOUS EVERY 5 MIN PRN
Status: DISCONTINUED | OUTPATIENT
Start: 2018-02-23 | End: 2018-02-23 | Stop reason: HOSPADM

## 2018-02-23 RX ORDER — SUCCINYLCHOLINE CHLORIDE 20 MG/ML
INJECTION INTRAMUSCULAR; INTRAVENOUS
Status: DISCONTINUED | OUTPATIENT
Start: 2018-02-23 | End: 2018-02-23

## 2018-02-23 RX ORDER — SODIUM CHLORIDE 0.9 % (FLUSH) 0.9 %
3 SYRINGE (ML) INJECTION
Status: DISCONTINUED | OUTPATIENT
Start: 2018-02-23 | End: 2018-02-24 | Stop reason: HOSPADM

## 2018-02-23 RX ORDER — SIMETHICONE 80 MG
80 TABLET,CHEWABLE ORAL EVERY 4 HOURS PRN
Status: DISCONTINUED | OUTPATIENT
Start: 2018-02-23 | End: 2018-02-24 | Stop reason: HOSPADM

## 2018-02-23 RX ORDER — ONDANSETRON 2 MG/ML
INJECTION INTRAMUSCULAR; INTRAVENOUS
Status: DISCONTINUED | OUTPATIENT
Start: 2018-02-23 | End: 2018-02-23

## 2018-02-23 RX ORDER — ONDANSETRON 8 MG/1
8 TABLET, ORALLY DISINTEGRATING ORAL EVERY 8 HOURS PRN
Status: DISCONTINUED | OUTPATIENT
Start: 2018-02-23 | End: 2018-02-24 | Stop reason: HOSPADM

## 2018-02-23 RX ORDER — OXYCODONE AND ACETAMINOPHEN 5; 325 MG/1; MG/1
1 TABLET ORAL
Status: DISCONTINUED | OUTPATIENT
Start: 2018-02-23 | End: 2018-02-23 | Stop reason: HOSPADM

## 2018-02-23 RX ADMIN — SODIUM CHLORIDE, SODIUM LACTATE, POTASSIUM CHLORIDE, AND CALCIUM CHLORIDE: .6; .31; .03; .02 INJECTION, SOLUTION INTRAVENOUS at 05:02

## 2018-02-23 RX ADMIN — METOCLOPRAMIDE 10 MG: 5 INJECTION, SOLUTION INTRAMUSCULAR; INTRAVENOUS at 05:02

## 2018-02-23 RX ADMIN — NEOSTIGMINE METHYLSULFATE 5 MG: 1 INJECTION INTRAVENOUS at 07:02

## 2018-02-23 RX ADMIN — Medication 10 MG: at 06:02

## 2018-02-23 RX ADMIN — GLYCOPYRROLATE 0.6 MG: 0.2 INJECTION, SOLUTION INTRAMUSCULAR; INTRAVENOUS at 07:02

## 2018-02-23 RX ADMIN — FENTANYL CITRATE 25 MCG: 50 INJECTION, SOLUTION INTRAMUSCULAR; INTRAVENOUS at 07:02

## 2018-02-23 RX ADMIN — FENTANYL CITRATE 50 MCG: 50 INJECTION INTRAMUSCULAR; INTRAVENOUS at 07:02

## 2018-02-23 RX ADMIN — PROPOFOL 200 MG: 10 INJECTION, EMULSION INTRAVENOUS at 05:02

## 2018-02-23 RX ADMIN — SODIUM CHLORIDE, SODIUM LACTATE, POTASSIUM CHLORIDE, AND CALCIUM CHLORIDE: .6; .31; .03; .02 INJECTION, SOLUTION INTRAVENOUS at 07:02

## 2018-02-23 RX ADMIN — FENTANYL CITRATE 100 MCG: 50 INJECTION INTRAMUSCULAR; INTRAVENOUS at 05:02

## 2018-02-23 RX ADMIN — SIMETHICONE CHEW TAB 80 MG 80 MG: 80 TABLET ORAL at 09:02

## 2018-02-23 RX ADMIN — KETOROLAC TROMETHAMINE 30 MG: 30 INJECTION, SOLUTION INTRAMUSCULAR at 07:02

## 2018-02-23 RX ADMIN — MIDAZOLAM HYDROCHLORIDE 2 MG: 1 INJECTION, SOLUTION INTRAMUSCULAR; INTRAVENOUS at 05:02

## 2018-02-23 RX ADMIN — ROCURONIUM BROMIDE 10 MG: 10 INJECTION, SOLUTION INTRAVENOUS at 05:02

## 2018-02-23 RX ADMIN — CEFAZOLIN SODIUM 2 G: 1 POWDER, FOR SOLUTION INTRAMUSCULAR; INTRAVENOUS at 05:02

## 2018-02-23 RX ADMIN — ACETAMINOPHEN 1000 MG: 10 INJECTION, SOLUTION INTRAVENOUS at 07:02

## 2018-02-23 RX ADMIN — ROCURONIUM BROMIDE 15 MG: 10 INJECTION, SOLUTION INTRAVENOUS at 06:02

## 2018-02-23 RX ADMIN — OXYCODONE HYDROCHLORIDE AND ACETAMINOPHEN 1 TABLET: 10; 325 TABLET ORAL at 09:02

## 2018-02-23 RX ADMIN — LIDOCAINE HYDROCHLORIDE 100 MG: 20 INJECTION, SOLUTION INTRAVENOUS at 05:02

## 2018-02-23 RX ADMIN — ONDANSETRON 4 MG: 2 INJECTION, SOLUTION INTRAMUSCULAR; INTRAVENOUS at 05:02

## 2018-02-23 RX ADMIN — ROCURONIUM BROMIDE 25 MG: 10 INJECTION, SOLUTION INTRAVENOUS at 05:02

## 2018-02-23 RX ADMIN — FENTANYL CITRATE 50 MCG: 50 INJECTION INTRAMUSCULAR; INTRAVENOUS at 06:02

## 2018-02-23 RX ADMIN — SUCCINYLCHOLINE CHLORIDE 100 MG: 20 INJECTION, SOLUTION INTRAMUSCULAR; INTRAVENOUS at 05:02

## 2018-02-23 NOTE — ED PROVIDER NOTES
"Encounter Date: 2/23/2018       History     Chief Complaint   Patient presents with    Surgery     "I'm supposed to be having surgery but they couldn't get the paperwork together on the other side so they sent me through this way." Pt reports she is supposed to having surgery today for ectopic pregnancy with Dr. Dai.     38-year-old female a patient of Dr. Dai OB/GYN doctor.  The patient has an ectopic pregnancy he wanted to admit her she went to registration that is not new why she was there is a Center to the ER.  I called and spoke to him and he wants her admitted to go to the operating room to treat her ectopic pregnancy.          Review of patient's allergies indicates:   Allergen Reactions    Dilaudid [hydromorphone (bulk)] Hives     Past Medical History:   Diagnosis Date    Hypertension      Past Surgical History:   Procedure Laterality Date    CHOLECYSTECTOMY      GALLBLADDER SURGERY       Family History   Problem Relation Age of Onset    Heart disease Mother     Diabetes Mother     Kidney disease Mother      Social History   Substance Use Topics    Smoking status: Never Smoker    Smokeless tobacco: Never Used    Alcohol use No      Comment: socially     Review of Systems   Constitutional: Negative for fever.   HENT: Negative for sore throat.    Respiratory: Negative for shortness of breath.    Cardiovascular: Negative for chest pain.   Gastrointestinal: Negative for nausea.   Genitourinary: Negative for dysuria.   Musculoskeletal: Negative for back pain.   Skin: Negative for rash.   Neurological: Negative for weakness.   Hematological: Does not bruise/bleed easily.       Physical Exam     Initial Vitals [02/23/18 1438]   BP Pulse Resp Temp SpO2   (!) 156/76 76 18 99.5 °F (37.5 °C) 99 %      MAP       102.67         Physical Exam    Nursing note and vitals reviewed.  Constitutional: She appears well-developed and well-nourished.   HENT:   Head: Normocephalic and atraumatic.   Mouth/Throat: " Oropharynx is clear and moist.   Eyes: Conjunctivae and EOM are normal. Pupils are equal, round, and reactive to light.   Neck: Normal range of motion. Neck supple.   Cardiovascular: Normal rate, regular rhythm, normal heart sounds and intact distal pulses.   Pulmonary/Chest: Breath sounds normal.   Abdominal: Soft. Bowel sounds are normal.   Musculoskeletal: Normal range of motion.   Neurological: She is alert and oriented to person, place, and time. She has normal strength and normal reflexes.   Skin: Skin is warm and dry.   Psychiatric: She has a normal mood and affect. Thought content normal.         ED Course   Procedures  Labs Reviewed - No data to display                               Clinical Impression:   Ectopic pregnancy  Disposition:   Disposition: Admitted                        Preet Hernandez MD  02/23/18 3310

## 2018-02-23 NOTE — PROGRESS NOTES
Pt here for pre-op for laparoscopic salpingectomy secondary to ectopic pregnancy.  Please see pre-op H&P for complete details.    Ramon Dai MD

## 2018-02-23 NOTE — ED TRIAGE NOTES
Pt arrived saying she was supposed to have surgery with Dr. Ramon Dai today and that something happened with paperwork at registration and registration sent her here

## 2018-02-23 NOTE — ANESTHESIA PREPROCEDURE EVALUATION
02/23/2018  Ana Wharton is a 38 y.o., female.    Anesthesia Evaluation    I have reviewed the Patient Summary Reports.     I have reviewed the Medications.     Review of Systems  Anesthesia Hx:  No problems with previous Anesthesia    Social:  No Alcohol Use, Non-Smoker    Cardiovascular:   Exercise tolerance: good Hypertension        Physical Exam  General:  Well nourished    Airway/Jaw/Neck:  Airway Findings: Mouth Opening: Normal Tongue: Normal  General Airway Assessment: Adult  Mallampati: II  TM Distance: Normal, at least 6 cm  Jaw/Neck Findings:  Neck ROM: Normal ROM      Dental:  Dental Findings: In tact   Chest/Lungs:  Chest/Lungs Findings: Clear to auscultation, Normal Respiratory Rate     Heart/Vascular:  Heart Findings: Rate: Normal        Mental Status:  Mental Status Findings:  Cooperative, Alert and Oriented         Anesthesia Plan  Type of Anesthesia, risks & benefits discussed:  Anesthesia Type:  general  Patient's Preference:   Intra-op Monitoring Plan: standard ASA monitors  Intra-op Monitoring Plan Comments:   Post Op Pain Control Plan: multimodal analgesia, IV/PO Opioids PRN and per primary service following discharge from PACU  Post Op Pain Control Plan Comments:   Induction:   IV  Beta Blocker:  Patient is not currently on a Beta-Blocker (No further documentation required).       Informed Consent: Patient understands risks and agrees with Anesthesia plan.  Questions answered. Anesthesia consent signed with patient.  ASA Score: 2     Day of Surgery Review of History & Physical:    H&P update referred to the surgeon.         Ready For Surgery From Anesthesia Perspective.

## 2018-02-24 VITALS
TEMPERATURE: 98 F | BODY MASS INDEX: 43.98 KG/M2 | SYSTOLIC BLOOD PRESSURE: 125 MMHG | HEIGHT: 60 IN | DIASTOLIC BLOOD PRESSURE: 73 MMHG | WEIGHT: 224 LBS | RESPIRATION RATE: 20 BRPM | HEART RATE: 63 BPM | OXYGEN SATURATION: 100 %

## 2018-02-24 LAB
BASOPHILS # BLD AUTO: 0.02 K/UL
BASOPHILS NFR BLD: 0.1 %
DIFFERENTIAL METHOD: ABNORMAL
EOSINOPHIL # BLD AUTO: 0.2 K/UL
EOSINOPHIL NFR BLD: 1.3 %
ERYTHROCYTE [DISTWIDTH] IN BLOOD BY AUTOMATED COUNT: 14.9 %
HCT VFR BLD AUTO: 30.8 %
HGB BLD-MCNC: 10.8 G/DL
LYMPHOCYTES # BLD AUTO: 3.8 K/UL
LYMPHOCYTES NFR BLD: 21.6 %
MCH RBC QN AUTO: 29.3 PG
MCHC RBC AUTO-ENTMCNC: 35.1 G/DL
MCV RBC AUTO: 84 FL
MONOCYTES # BLD AUTO: 1.3 K/UL
MONOCYTES NFR BLD: 7.3 %
NEUTROPHILS # BLD AUTO: 12.2 K/UL
NEUTROPHILS NFR BLD: 69.5 %
PLATELET # BLD AUTO: 291 K/UL
PMV BLD AUTO: 10.2 FL
RBC # BLD AUTO: 3.68 M/UL
WBC # BLD AUTO: 17.56 K/UL

## 2018-02-24 PROCEDURE — 36415 COLL VENOUS BLD VENIPUNCTURE: CPT

## 2018-02-24 PROCEDURE — 25000003 PHARM REV CODE 250: Performed by: OBSTETRICS & GYNECOLOGY

## 2018-02-24 PROCEDURE — G0378 HOSPITAL OBSERVATION PER HR: HCPCS

## 2018-02-24 PROCEDURE — 85025 COMPLETE CBC W/AUTO DIFF WBC: CPT

## 2018-02-24 RX ORDER — OXYCODONE AND ACETAMINOPHEN 5; 325 MG/1; MG/1
1 TABLET ORAL EVERY 4 HOURS PRN
Qty: 20 TABLET | Refills: 0 | Status: SHIPPED | OUTPATIENT
Start: 2018-02-24 | End: 2021-02-15

## 2018-02-24 RX ADMIN — SIMETHICONE CHEW TAB 80 MG 80 MG: 80 TABLET ORAL at 05:02

## 2018-02-24 RX ADMIN — OXYCODONE HYDROCHLORIDE AND ACETAMINOPHEN 1 TABLET: 10; 325 TABLET ORAL at 05:02

## 2018-02-24 RX ADMIN — SIMETHICONE CHEW TAB 80 MG 80 MG: 80 TABLET ORAL at 09:02

## 2018-02-24 NOTE — DISCHARGE SUMMARY
[facility]    Obstetrics & Gynecology  Discharge Summary      Patient Name:  Ana Wharton  MRN:  [id number]  Admission Date:  2018  Discharge Date:  2018  Hospital Length of Stay:  0  Attending Physician:  Ramon Dai MD  Discharging Physician:  Ramon Dai MD  Primary Care Provider:  Son JOSÉ LUIS MD Malaika  Date of Surgery:  2018    Admitting Diagnosis:       Right ectopic pregnancy at ~6 weeks   Hemodynamically stable  Pelvic pain  Declined medical/conservative management     Discharge Diagnosis:    Right ectopic pregnancy at ~6 weeks  Dense pelvic adhesions    Procedure performed:      Laparoscopic right salpingectomy   Lysis of adhesions    Brief Hospital Course:      See H&P for complete details.  In brief, Ana Wharton is a 38 y.o.  with a right ectopic pregnancy requesting definitive surgical therapy with salpingectomy.  Pt attempted medical management with methotrexate x 1 dose earlier this week but has since changed her mind.  Pt has declined medical management, or other conservative therapies, and is resolute in her decision to proceed with surgery.  Pt tolerated the surgery well without complication.  See operative report for complete details.  Following the surgery, pt was transferred to the PACU in stable condition and subsequently to the floor for routine post-op care.  Pt had a fairly uncomplicated post-op course.  Prior to discharge, pt was without major complaints.  Pain was well controlled.  Pt was ambulating, tolerating a regular diet, and voiding without difficulty.  Vital signs where physiologic and stable.  Physical exam showed no acute findings.  Pt continued to recover well during the remainder of her hospital stay.  Pt had satisfied routine post-op discharge criteria and expressed a strong desire to be discharge from the hospital.     Pertinent Labs or Studies:      Recent Results (from the past 336 hour(s))   CBC auto differential     Collection Time: 02/24/18  6:53 AM   Result Value Ref Range    WBC 17.56 (H) 3.90 - 12.70 K/uL    Hemoglobin 10.8 (L) 12.0 - 16.0 g/dL    Hematocrit 30.8 (L) 37.0 - 48.5 %    Platelets 291 150 - 350 K/uL   CBC auto differential    Collection Time: 02/23/18  9:30 AM   Result Value Ref Range    WBC 9.90 3.90 - 12.70 K/uL    Hemoglobin 11.5 (L) 12.0 - 16.0 g/dL    Hematocrit 33.9 (L) 37.0 - 48.5 %    Platelets 322 150 - 350 K/uL   CBC W/ AUTO DIFFERENTIAL    Collection Time: 02/20/18  7:41 AM   Result Value Ref Range    WBC 10.26 3.90 - 12.70 K/uL    Hemoglobin 12.3 12.0 - 16.0 g/dL    Hematocrit 34.4 (L) 37.0 - 48.5 %    Platelets 278 150 - 350 K/uL     Discharge Exam:      Temp:  [97 °F (36.1 °C)-98.4 °F (36.9 °C)] 98.2 °F (36.8 °C)  Pulse:  [60-92] 63  Resp:  [18-20] 20  SpO2:  [99 %-100 %] 100 %  BP: (125-157)/(60-83) 125/73    /73   Pulse 63   Temp 98.2 °F (36.8 °C)   Resp 20   Ht 5' (1.524 m)   Wt 101.6 kg (224 lb)   SpO2 100%   Breastfeeding? Unknown   BMI 43.75 kg/m²     GENERAL:  NAD, AOx3   HEENT:  No scleral icterus, neck supple, moist mucus membranes  LUNGS:  CTA-B   HEART:  RRR no m/g/r  ABDOMEN:  Soft, appropriately tender, no guarding, rigidity, or rebound with normoactive bowel sounds.   INCISION:  Clean, dry, & intact   EXT:  No cramping, claudication or edema. +2 distal pulses. Symmetric, full range of motion.   NEURO:  Grossly intact bilaterally   PSYCH:  Mood and affect appropriate   PERINEUM:  Intact with no vaginal bleeding    Discharge Condition:  Stable      Discharge Disposition:  Home       Discharge Medications:     Motrin 600 mg 1 tab p.o. q6h prn pain, disp #60, refill 0  Percocet 5/325 mg 1 tab p.o. q4-6h prn breakthrough pain, disp #20, refill 0   Resume home medications as previously prescribed    Discharge Activites:      Activities as tolerated with adequate rest  Pelvic rest for 6 weeks   No heavy lifting greater 10 lbs or vigorous activities   Showers only  Wound care  instructions and precautions given  Avoid driving and operating machinery while taking narcotics or other sedative medications.  Patient voiced understanding.    Discharge Diet:  Regular diet     Discharge Instructions:      Post-op care instructions and precautions, clinical/sugical findings, and hospital course reviewed with patient prior to discharge.  All of her questions were answered to her satisfaction.  Pt voiced understanding.  Pt was instructed to contact the clinic or emergency department for any concerns including but not limited to an increase in her vaginal bleeding, abdominal pain, foul vaginal discharge, weakness, decrease activity level, decrease appetite, difficulty with voiding or having bowel movements, wound breakdown, perineal pain or breakdown, fever >100.4, shortness of breath, chest pain, dizziness or feeling faint, pain or swelling in her extremities, headaches not relieved with rest and Tylenol, abnormal bleeding/bruising, or any other health concerns.      Follow-up Visit:  Return to clinic in 2 weeks for post-op visit or sooner if any problems.       Ramon Dai MD

## 2018-02-24 NOTE — H&P
"[facility]  History & Physical  Obstetrics & Gynecology       Patient Name:  Ana Wharton  MRN:  [id number]  Admission Date:  2018  Hospital Length of Stay:  0  Attending Physician:  Ramon Dai MD  Primary Care Provider:  Son JOSÉ LUIS MD Malaika    Date:  2018    Chief Complaint:  Right ectopic pregnancy    History of Present Illness:      Ana Wharton is a 38 y.o.  here for follow up right ectopic pregnancy.  Pt was diagnosed with an unruptured right ectopic pregnancy at 6w0d in  emergency department on .  During the ED visit, pt did not want surgery and opted for medical mgt with methotrexate.  Pt is here today for follow up for her ectopic pregnancy.  Pt states today that she does not want to continue medical mgt for the ectopic pregnancy due to abd cramping since her discharge from ED stating "I am afraid of the unknown, I don't want to continue to wait anymore or take more medication, my sister had an ectopic pregnancy and nearly ".  Her HCG increased from 13K to 17K since 4 days ago.  After a lengthy discussion, pt declined further medical mgt and is resolute in her decision to proceed with surgery.    Past Medical History:      Chronic HTN  Sickle cell trait  Congenital absent left ovary per pt     Past Surgical History:      Laparoscopic cholecystectomy      Medications:      Prenatal vitamins     Allergies:      Dilaudid - hives       Obstetric History:       G1, current     Gynecologic History:      Denies active STI  Denies history abnormal Pap     Social History:       Denies tobacco, alcohol or illicit drug use  Current partner is father of baby  Denies domestic abuse     Family History:       Denies congenital anomalies, inherited syndromes, fetal aneuploidy    Vitals:     /82 (BP Location: Left arm, Patient Position: Sitting, BP Method: Large (Manual))   Ht 5' 1" (1.549 m)   Wt 99 kg (218 lb 4.1 oz)   LMP 2017   BMI 41.24 kg/m² "     Review of Systems   Constitutional: Negative.    HENT: Negative.    Eyes: Negative.    Respiratory: Negative.    Cardiovascular: Negative.    Gastrointestinal: Negative.    Endocrine: Negative.    Genitourinary: Negative.    Musculoskeletal: Negative.    Skin:  Negative.   Neurological: Negative.    Hematological: Negative.    Psychiatric/Behavioral: Negative.    Breast: negative.       Physical Exam:   Constitutional: No distress.  Alert and oriented to person, place and time.  HEENT:  Normocephalic, atraumatic, anicteric, EOMI, moist mucus membranes.  Neck supple without masses.  LUNGS:  Clear to auscultation bilaterally.  HEART:  Regular rate & rhythm with physiologic heart sounds.  ABDOMEN:  Soft, non tender without any guarding, rigidity or rebound. Normoactive bowel sounds.  GENITOURINARY:  NFEG no lesion. No vaginal or cervical lesion. Mild bleeding.  No discharge. No CMT. Uterus small, NT.  Right adnexa enlarged and tender. Declined rectal exam. No obvious external lesions.  EXTREMITIES:  Symmetric without cramping, claudication. Trace edema LE. +2 distal pulses.  Full range of motion.  SKIN:  No rashes, good turgor & capillary refill.  NEUROLOGIC:  Grossly intact bilaterally. +2 DTR, symmetric.  PSYCH:  Mood & affect appropriate.       Labs:    Lab Results   Component Value Date    WBC 9.90 02/23/2018    HGB 11.5 (L) 02/23/2018    HCT 33.9 (L) 02/23/2018    MCV 86 02/23/2018     02/23/2018     BMP  Lab Results   Component Value Date     02/23/2018    K 4.1 02/23/2018     02/23/2018    CO2 28 02/23/2018    BUN 8 02/23/2018    CREATININE 0.8 02/23/2018    CALCIUM 9.2 02/23/2018    ANIONGAP 2 (L) 02/23/2018    ESTGFRAFRICA >60 02/23/2018    EGFRNONAA >60 02/23/2018     Lab Results   Component Value Date    ALT 17 02/23/2018    AST 14 02/23/2018    ALKPHOS 58 02/23/2018    BILITOT 0.3 02/23/2018     Component      Latest Ref Rng & Units 2/23/2018 2/20/2018   hCG Quant      See Text mIU/mL  [id number]     Ultrasound:  18    Results: Transabdominal followed by a transvaginal ultrasound of the pelvis obtained.  The uterus measures 8.6 x 5.9 x 5.5 cm.  No intrauterine gestational sac seen.  There is a small gestational sac within the right adnexa with a yolk sac and a fetal pole, fetal heart rate of 136 beats/ minute.  Crown-rump length measuring 3.2 mm corresponding to 6 weeks and 0 day pregnancy.  Mean sac diameter 1.24 cm.  This is an ectopic pregnancy.  The right ovary is well-seen and separate from the gestational sac, it measures 4.1 x 4.2 x 3.2 cm with a small corpus luteal cyst seen.  The left ovary is not seen. There is flow to the right ovary.  No free fluid.    Assessment:     1. 38 y.o.  with right ectopic pregnancy at 6w0d  2. Hemodynamically stable  3. Pt declined conservative medical management with methotrexate and requesting definitive surgery with salpingectomy  4. Congenital absent left ovary per pt    Plan:    I d/w pt in great detail various mgt options for ectopic pregnancy.  After a lengthy discussion, pt states she does not want to continue conservative medical mgt with methotrexate and is requesting definitive therapy with laparoscopic salpingectomy.  Pt is resolute in her decision to proceed with surgery.  Pt clearly voiced in the presence of nurse that she request removal of the affected fallopian tube with salpingectomy (in lieu of salpingostomy) to avoid the risk of future recurrence of tubal ectopic pregnancy.  Pt understands that she will need ART such as IVF if she decides to pursue pregnancy after the surgery.  Informed consented was obtained for surgery and blood transfusion.  All questions answered, pt voiced understanding.      Ramon Dai MD

## 2018-02-24 NOTE — DISCHARGE INSTRUCTIONS
Take showers for next 6 weeks  No tampons, douching or sexual intercourse for next 6 weeks or until ok with doctor  No driving for 2 weeks   Do not lift anything heavier than 10 pounds for next 6 weeks  Walking is only form of exercise allowed for next 6 weeks unless instructed otherwise by your doctor    Notify your doctor if you have:    -pain not relieved by your pain medication  -unexplained swelling of arms or legs  -uncontrolled nausea/vomiting  -redness, swelling, or drainage from incision  -fever of 101* or higher  -heavy vaginal bleeding        After Laparoscopic Treatment of Ectopic Pregnancy  You were diagnosed with ectopic pregnancy, a pregnancy that develops outside the uterus (womb). The most common site for an ectopic pregnancy is the fallopian tube (one of the tubes connecting the ovaries to the uterus). Your healthcare provider performed a laparoscopic procedure to treat your condition. During the procedure, the healthcare provider made several small incisions and inserted surgical instruments. The following are some instructions for caring for yourself when you are at home.    Activity  Do's and don'ts include the following:  · Rest for a week after your surgery, even if you feel better sooner. Your body needs time to heal.  · Ask your family or friends to help with chores and errands while you recover.  · Avoid exercise and other strenuous activities until the healthcare provider says its OK.  · Dont lift anything heavier than 10 pounds to avoid straining your incisions.  · Climb stairs slowly and pause after every few steps.  · Dont drive for a few days after the surgery. You may drive as soon as you are able to move comfortably from side to side and are no longer taking narcotic pain medicine.  · Walk as often as you feel able.  · Ask your healthcare provider when its OK to resume sex.  Other home care  · Continue with the coughing and deep breathing exercises that you learned in the  hospital.  · Avoid constipation:  ¨ Eat fruits, vegetables, and whole grains.  ¨ Drink 6 to 8 glasses of water every day, unless directed otherwise.  ¨ Use a laxative or a mild stool softener if your healthcare provider says its OK.  · Wash your incision with mild soap and water. Pat it dry. Dont use oils, powders, or lotions on your incision.  · Shower as usual.  Follow-up care  · Make a follow-up appointment as directed by our staff.  · Your healthcare provider may order follow-up blood tests to make sure that the ectopic pregnancy has been completely removed.     When to call your healthcare provider  Call your healthcare provider right away if you have any of the following:  · Redness, swelling, or drainage at your incision site  · Fever of 100.4°F (38.0°C) or higher  · Pain that is not relieved by your medicine  · Stomach pain and swelling that get worse  · Vaginal discharge or bleeding  · Dizziness or fainting  · Nausea and vomiting   Date Last Reviewed: 6/1/2016  © 6192-7259 [employer]. [address] [place] [address]. All rights reserved. This information is not intended as a substitute for professional medical care. Always follow your healthcare professional's instructions.        Pelvic Laparoscopy    Laparoscopy is a type of surgery done using very small incisions. This type of surgery is possible because of the laparoscope (a long, slender tool with a camera and light). It lets your surgeon see inside the stomach. To perform the surgery, special instruments are inserted into the stomach through small incisions. Pelvic laparoscopy is often used to diagnose and treat the causes of pelvic problems, such as pain and infertility. Laparoscopy often involves:  · A short hospital stay. (You can most likely go home the same day.)  · A quick recovery  · Minimal anesthesia  · Small external scars  · Mild to moderate postoperative pain  Getting ready  To prepare for surgery:  · Tell your  surgeon about any medicines you take. Include herbs, supplements, and over-the-counter medicines. You may need to stop taking certain medicines, such as aspirin, for 7 to 10 days before surgery.  · Do not eat or drink anything after the midnight before surgery.  · Arrange for a ride home after surgery.  Before the procedure  You will most likely be given general anesthesia to make you sleep during the procedure. A catheter may be inserted to drain urine from the bladder.   How pelvic laparoscopy is done  One or more small (quarter- or half-inch) incisions are made near the navel or the pubic hairline, or on either side of the lower belly. The laparoscope is inserted through an incision. It sends images to a video screen, allowing the surgeon a close-up view of the organs. Gas is used to inflate the belly, allowing the surgeon room to see and work. Depending on what is found, surgery to treat the problem may be done at this time.  After the procedure  · Youll be taken to a post-op area to wake up and recover from anesthesia.  · You may feel some shoulder pain. This is due to irritation from the gas used to inflate the belly.  · You may have some discharge from the vagina. If so, ask the nurse for a pad.  · You will be asked to walk around to improve breathing and blood flow.  · If you had a catheter, it will most likely be removed before you go home.  · You can go home as soon as you recover from anesthesia and your condition is stable.  Your recovery  Recovery time depends on which procedure was done through the laparoscope. Your recovery from pelvic laparoscopy may take up to 6 weeks. If it was a simple procedure, like tubal ligation, then 2 weeks is reasonable. For laparoscopic hysterectomies, the recovery may be closer to 6 weeks. While you recover, be sure to follow your healthcare providers instructions. During this time:  · Take pain medicine as prescribed.  · Start eating solid food when you feel ready. To  avoid constipation, eat fruits, vegetables, and whole grains. Drink plenty of fluids.  · Dont lift anything heavy until your healthcare provider says its safe.  · Take it easy for a few days. Ask your healthcare provider when you can return to work, exercise, and sex.  · Arrange for a follow-up visit with your healthcare provider to discuss the results of the procedure.  Call your healthcare provider  Contact your healthcare provider right away if you have any of the following:  · Have chills, or a fever of 100.4°F (38°C) or higher, or as directed by your healthcare provider  · Notice that the incision is red, swollen, or draining  · Have heavy, bright-red vaginal bleeding or a smelly discharge  · Have difficulty urinating  · Experience severe belly pain or bloating  · Have leg pain, redness, or swelling  · Have persistent nausea or vomiting  · Are not improving daily  · Fainting  · Trouble breathing   Date Last Reviewed: 12/1/2016  © 3929-9809 [employer]. [address] [place] [address], [place] [address]. All rights reserved. This information is not intended as a substitute for professional medical care. Always follow your healthcare professional's instructions.

## 2018-02-24 NOTE — TRANSFER OF CARE
Anesthesia Transfer of Care Note    Patient: Ana Wharton    Procedure(s) Performed: Procedure(s) (LRB):  REMOVAL-ECTOPIC PREGNANCY (N/A)    Patient location: PACU    Anesthesia Type: general    Transport from OR: Transported from OR on room air with adequate spontaneous ventilation    Post pain: adequate analgesia    Post assessment: no apparent anesthetic complications and tolerated procedure well    Post vital signs: stable    Level of consciousness: sedated and responds to stimulation    Nausea/Vomiting: no nausea/vomiting    Complications: none    Transfer of care protocol was followed      Last vitals:   Visit Vitals  BP (!) 157/83 (BP Location: Right arm)   Pulse 76   Temp 36.9 °C (98.4 °F) (Oral)   Resp 18   Ht 5' (1.524 m)   Wt 98.9 kg (218 lb)   LMP 12/30/2017   SpO2 100%   Breastfeeding? No   BMI 42.58 kg/m²

## 2018-02-24 NOTE — OP NOTE
[facility]   Operative Report   Obstetrics & Gynecology     Date of Surgery:  2018     Surgeon:  Ramon Dai MD    Preoperative diagnosis:     Right ectopic pregnancy at ~6 weeks   Hemodynamically stable  Pelvic pain  Declined medical/conservative management     Postoperative diagnosis:     Right ectopic pregnancy at ~6 weeks  Dense pelvic adhesions    Procedure performed:  Laparoscopic right salpingectomy (CPT 76720)    Anesthesia:  General      IV Fluids:  1200 mL of LR     Urine Output:  200 mL, clear at end of procedure       Estimated Blood Loss:  25 mL, no replacements    Specimens:  Right fallopian tube with ectopic pregnancy    Findings:       Unruptured, dilated erythmatous right fallopian tube with ectopic pregnancy    No hemoperitoneum  Dense pelvic adhesions, involving omentum to anterior uterus and anterior abdominal wall limiting visualization of uterus and ovary  Limited view of uterus appears normal   Right ovary appears normal   Unable to visualized left adnexal region/left ovary due to dense pelvic adhesions  Limited view of anterior and posterior cul-de-sac appears clear  Pelvic side walls clear without gross lymphadenopathy  Omentum, small and large bowel in operative field normal appearing    Complications:  Dense pelvic adhesions     Indications for the Procedure:      See H&P for complete details.  In brief, Ana Wharton is a 38 y.o.  with a right ectopic pregnancy requesting definitive surgical therapy with salpingectomy.  Pt attempted medical management with methotrexate x 1 dose earlier this week but has since changed her mind.  Pt has declined medical management, or other conservative therapies, and is resolute in her decision to proceed with surgery.  Due to pt report of congential absent of left ovary, pt was informed that following removal of her right fallopian tube that she may require assisted reproductive technology (e.g. IVF) for future  conception.     We discussed in great detail with the patient the risks, benefits, and alternatives to various management options for ectopic pregnancy including medical versus surgical management.  The patient opted for surgical removal of the ectopic pregnancy with salpingectomy to avoid future risk of ectopic pregnancy.  The patient was informed of the risks, benefits, alternatives and possible complications to diagnostic laparoscopy, possible exploratory laparotomy with possible salpingectomy, oophorectomy, uterine resection and/or hysterectomy.  The risks include, but were not limited to, bleeding, death, injury to bladder and/or urinary tract, injury to bowel and/or intestinal obstruction, risk of infection, damage to major blood vessels, hemorrhage requiring transfusion of blood products and/or hysterectomy, painful intercourse, ovarian failure requiring hormone administration, pulmonary embolism, fistula formation, sexual dysfunction, failure of wound to heal, incisional hernias, permanent and disfiguring scarring.  In the event of a hysterectomy +/- bilateral salpingo-oophorectomy (BS&O) if uterine/ovarian injury or uncontrollable hemorrhage were to occur, the patient was counseled extensively on the inability to become pregnant following a hysterectomy and in the event of a BS&O will result in surgical menopause for pre/perimenopausal women.  The patient was counseled on the potential loss of fertility following unilateral salpingectomy or oophorectomy.  The patient was counseled on possible laparotomy and all other indicated procedures.  The patient expressed understanding of the risks involved, all questions were answered, and the patient consented to the procedure and blood transfusion.  Informed consent was obtained.    Description of the Procedure:      Pt was taken to the operating room where a time out was performed to confirm the correct patient and correct procedure.  Preoperative prophylactic IV  antibiotics was administered prior to the procedure.  Pt was then placed in the dorsal lithotomy position with legs supported in Herbie stirrups and care was taken to pad all pressure points.  General anesthesia was obtained without difficulty.  A Simeon hugger was placed to maintain control of core body temperature.  Pt was then prepped and draped in a normal sterile fashion.  A rodrigez catheter was inserted.  A HUMI uterine manipulator was placed without difficulty.  Attention was turned to the abdomen for laparoscopy.  A small vertical incision was made infraumbilical.  The Veress needle was introduced into the peritoneum.  Placement of the needle was confirmed with normal saline drop test.  Pneumoperitoneum was then established with ~3 liters of carbon dioxide and the Veress needle removed.  A 5 mm trocar was inserted through the paraumbilical incision into the peritoneum without difficulty and pneumoperitoneum was then maintained using carbon dioxide.   Next, two additional small incisions where made for the secondary trocars, one in the right and left lower quadrant approximately 3 cm from iliac crest.  A 5 mm ports where introduced under direct visualization in LLQ.  A 10 mm ports where introduced under direct visualization RLQ.  Survey of the pelvis revealed the above findings.  Attention was then turned to performing the right salpingectomy.  The pelvic courses of the ureters where identified to be well out of the surgical field.  The dilated right fallopian tube was grasped with alligator forceps and the mesosalpinx were coagulate/cut using the EnSeal and removed.  The dilated tube containing the ectopic pregnancy was placed in a EndoCatch bag for removal and sent for routine pathology.  The pelvic cavity was then throughly irrigated and excellent hemostasis was noted.  All instruments and ports where removed from abdomen under direct visualization with the laparoscope.  The pneumoperitoneum was evacuated and  the 5 mm paraumbilical port was removed.  The fascia in the 10 mm port was reapproximated with 0-Vicryl.  Again, hemostasis was assured.  All skin incisions were closed using 4-0 monocryl and sterile dressing was applied.  The HUMI was removed at the end of the case.  Pt tolerated the procedure well.  Sponge, lap and needle counts were correct time two.  Pt was transferred to the PACU in stable condition.  Surgical findings and expectations were discussed with pt and family.  All of her questions were answered to her satisfaction and pt voiced understanding.       Ramon Dai MD

## 2018-02-24 NOTE — PLAN OF CARE
Problem: Patient Care Overview  Goal: Plan of Care Review  Pt progressing well. Tolerating regular diet. VSS. Ambulating and voiding. POC discussed with pt. Understanding verbalized.

## 2018-02-24 NOTE — PROGRESS NOTES
[facility] called nursing unit to get medical information of pt. Due to her sister being in the  and wanting to come home to help pt. Will call house supervisor.    0950: Apurva (house supervisor) states to fill out Authorization for release of confidential information by patient.     0952: Authorization of release of confidential information signed by pt. Pt. Understands why signing paper, states her sister must want to come and help her. Pt. Has no restrictions of information to give to [facility], okay to give all information to [facility].     0956: Spoke with Elis at [facility] and gave asked information to them regarding pt. Stay (ie: diagnosis, dates of admission, restrictions at home).

## 2018-02-25 NOTE — ANESTHESIA POSTPROCEDURE EVALUATION
Anesthesia Post Evaluation    Patient: Ana Wharton    Procedure(s) Performed: Procedure(s) (LRB):  REMOVAL-ECTOPIC PREGNANCY (Right)  SALPINGECTOMY-LAPAROSCOPIC (Right)    Final Anesthesia Type: general  Patient location during evaluation: PACU  Patient participation: Yes- Able to Participate  Level of consciousness: awake and alert and oriented  Post-procedure vital signs: reviewed and stable  Pain management: adequate  Airway patency: patent  PONV status at discharge: No PONV  Anesthetic complications: no      Cardiovascular status: blood pressure returned to baseline and hemodynamically stable  Respiratory status: unassisted, spontaneous ventilation and room air  Hydration status: euvolemic  Follow-up not needed.        Visit Vitals  /73   Pulse 63   Temp 36.8 °C (98.2 °F)   Resp 20   Ht 5' (1.524 m)   Wt 101.6 kg (224 lb)   SpO2 100%   Breastfeeding? Unknown   BMI 43.75 kg/m²       Pain/Rosie Score: Pain Assessment Performed: Yes (2/24/2018 10:19 AM)  Presence of Pain: denies (2/24/2018 10:19 AM)  Pain Rating Prior to Med Admin: 0 (2/24/2018  7:36 AM)  Pain Rating Post Med Admin: 0 (2/24/2018  6:00 AM)

## 2018-03-28 ENCOUNTER — ROUTINE PRENATAL (OUTPATIENT)
Dept: OBSTETRICS AND GYNECOLOGY | Facility: CLINIC | Age: 38
End: 2018-03-28
Payer: MEDICAID

## 2018-03-28 VITALS
SYSTOLIC BLOOD PRESSURE: 114 MMHG | WEIGHT: 218.25 LBS | DIASTOLIC BLOOD PRESSURE: 70 MMHG | BODY MASS INDEX: 42.63 KG/M2

## 2018-03-28 DIAGNOSIS — Z09 POSTOP CHECK: Primary | ICD-10-CM

## 2018-03-28 DIAGNOSIS — Z90.79 STATUS POST BILATERAL SALPINGECTOMY: ICD-10-CM

## 2018-03-28 PROBLEM — O09.529 AMA (ADVANCED MATERNAL AGE) MULTIGRAVIDA 35+: Status: RESOLVED | Noted: 2018-02-09 | Resolved: 2018-03-28

## 2018-03-28 PROBLEM — Z34.90 EARLY STAGE OF PREGNANCY: Status: RESOLVED | Noted: 2018-02-09 | Resolved: 2018-03-28

## 2018-03-28 PROBLEM — O99.210 MATERNAL OBESITY AFFECTING PREGNANCY, ANTEPARTUM: Status: RESOLVED | Noted: 2018-02-09 | Resolved: 2018-03-28

## 2018-03-28 PROBLEM — O00.109 ECTOPIC PREGNANCY, TUBAL: Status: RESOLVED | Noted: 2018-02-23 | Resolved: 2018-03-28

## 2018-03-28 PROBLEM — O10.919 CHRONIC HYPERTENSION COMPLICATING OR REASON FOR CARE DURING PREGNANCY: Status: RESOLVED | Noted: 2018-02-09 | Resolved: 2018-03-28

## 2018-03-28 PROCEDURE — 99999 PR PBB SHADOW E&M-EST. PATIENT-LVL I: CPT | Mod: PBBFAC,,, | Performed by: OBSTETRICS & GYNECOLOGY

## 2018-03-28 PROCEDURE — 99499 UNLISTED E&M SERVICE: CPT | Mod: S$PBB,,, | Performed by: OBSTETRICS & GYNECOLOGY

## 2018-03-28 PROCEDURE — 99211 OFF/OP EST MAY X REQ PHY/QHP: CPT | Mod: PBBFAC | Performed by: OBSTETRICS & GYNECOLOGY

## 2018-03-28 NOTE — PROGRESS NOTES
[facility]  Ambulatory Clinic  Obstetrics & Gynecology    Visit Date:  3/28/2018    Chief Complaint:  Post-op visit    Subjective:      Ana Wharton is a 38 y.o.  here for post-op visit.  Pt is s/p laparoscopic right salpingectomy secondary to ectopic pregnancy on 18.  Pt states she is doing well since her surgery and has no major complaints today.  Pt has resumed regular activities and states she pleased with surgical results.  Pt denies any vaginal bleeding, discharge, pain, incisional problems, GI or  complaints.      Review of Systems:      Constitutional:  No fever, fatigue  HENT:  No congestion, hearing changes  Eyes:  No visual disturbance  Respiratory:  No cough, shortness of breath  Cardiovascular:  No chest pain, leg swelling  Gastrointestinal:  No abdominal pain, constipation, blood in stool   Genitourinary:  No dysuria, frequency  Skin:  No rash, jaundice  Neurological:  No dizziness, weakness, headaches    Objective:     /70   Wt 99 kg (218 lb 4.1 oz)   BMI 42.63 kg/m²      GENERAL:  NAD, well-nourished  HEENT:  NCAT, moist mucus membranes. Neck supple w/o masses.  LUNGS:  CTA-B  HEART:  RRR, no murmurs, gallops, or rubs  ABDOMEN:  Soft, non-tender, non-distended. Normoactive BS. No obvious organomegaly.   Incision well healed.   EXT:  Symmetric w/o cramping, claudication, or edema. +2 distal pulses.  SKIN:  No rashes or bruising  NEURO:  Grossly intact bilaterally  PSYCH:  Mood and affect appropriate  GENITOURINARY:  NFEG no lesion. No vaginal or cervical lesion. No bleeding or discharge. No CMT. Uterus small, NT. Right ovary small nontender.  Left ovary absent per pt.     Chaperone present for exam.    Laboratory:    Lab Results   Component Value Date    WBC 17.56 (H) 2018    HGB 10.8 (L) 2018    HCT 30.8 (L) 2018    MCV 84 2018     2018     Sugical pathology:    Tubal ectopic pregnancy    Surgical pathology reviewed  with pt.    Assessment:    1. 38 y.o.  s/p laparoscopic right salpingectomy secondary to ectopic pregnancy on 18 - doing well.    Plan:    Post-op care instructions and precautions reviewed.  Surgical findings and pathology discussed with pt.  Pelvic rest x 6 wks post-op.  Encourage healthy lifestyle modifications.F/u with PCP for health maintenance.    Return in 1 year for GYN exam, or sooner for any concerns.  All of her questions were answered to her satisfactions, pt voiced understanding.       Ramon Dai MD

## 2020-01-09 ENCOUNTER — OFFICE VISIT (OUTPATIENT)
Dept: OBSTETRICS AND GYNECOLOGY | Facility: CLINIC | Age: 40
End: 2020-01-09
Payer: MEDICAID

## 2020-01-09 VITALS
DIASTOLIC BLOOD PRESSURE: 72 MMHG | BODY MASS INDEX: 44.46 KG/M2 | SYSTOLIC BLOOD PRESSURE: 96 MMHG | HEIGHT: 60 IN | WEIGHT: 226.44 LBS

## 2020-01-09 DIAGNOSIS — Z01.419 WELL WOMAN EXAM WITH ROUTINE GYNECOLOGICAL EXAM: Primary | ICD-10-CM

## 2020-01-09 PROCEDURE — 99999 PR PBB SHADOW E&M-EST. PATIENT-LVL II: CPT | Mod: PBBFAC,,, | Performed by: OBSTETRICS & GYNECOLOGY

## 2020-01-09 PROCEDURE — 99395 PR PREVENTIVE VISIT,EST,18-39: ICD-10-PCS | Mod: S$PBB,,, | Performed by: OBSTETRICS & GYNECOLOGY

## 2020-01-09 PROCEDURE — 99999 PR PBB SHADOW E&M-EST. PATIENT-LVL II: ICD-10-PCS | Mod: PBBFAC,,, | Performed by: OBSTETRICS & GYNECOLOGY

## 2020-01-09 PROCEDURE — 99395 PREV VISIT EST AGE 18-39: CPT | Mod: S$PBB,,, | Performed by: OBSTETRICS & GYNECOLOGY

## 2020-01-09 PROCEDURE — 99212 OFFICE O/P EST SF 10 MIN: CPT | Mod: PBBFAC | Performed by: OBSTETRICS & GYNECOLOGY
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
## 2020-04-02 RX ORDER — LISINOPRIL AND HYDROCHLOROTHIAZIDE 12.5; 2 MG/1; MG/1
TABLET ORAL
Qty: 180 TABLET | Refills: 3 | Status: CANCELLED | OUTPATIENT
Start: 2020-04-02

## 2020-04-21 DIAGNOSIS — Z01.84 ANTIBODY RESPONSE EXAMINATION: ICD-10-CM

## 2020-04-22 ENCOUNTER — LAB VISIT (OUTPATIENT)
Dept: LAB | Facility: HOSPITAL | Age: 40
End: 2020-04-22
Attending: INTERNAL MEDICINE
Payer: COMMERCIAL

## 2020-04-22 DIAGNOSIS — Z01.84 ANTIBODY RESPONSE EXAMINATION: ICD-10-CM

## 2020-04-22 LAB — SARS-COV-2 IGG SERPL QL IA: NEGATIVE

## 2020-04-22 PROCEDURE — 36415 COLL VENOUS BLD VENIPUNCTURE: CPT

## 2020-04-22 PROCEDURE — 86769 SARS-COV-2 COVID-19 ANTIBODY: CPT

## 2020-05-12 RX ORDER — METHIMAZOLE 5 MG/1
TABLET ORAL
Qty: 150 TABLET | Refills: 0 | Status: CANCELLED | OUTPATIENT
Start: 2020-05-12

## 2020-08-03 ENCOUNTER — HOSPITAL ENCOUNTER (EMERGENCY)
Facility: HOSPITAL | Age: 40
Discharge: HOME | End: 2020-08-03
Attending: EMERGENCY MEDICINE
Payer: COMMERCIAL

## 2020-08-03 VITALS
TEMPERATURE: 98 F | SYSTOLIC BLOOD PRESSURE: 152 MMHG | BODY MASS INDEX: 41.25 KG/M2 | WEIGHT: 232.81 LBS | HEART RATE: 81 BPM | DIASTOLIC BLOOD PRESSURE: 80 MMHG | HEIGHT: 63 IN | RESPIRATION RATE: 18 BRPM | OXYGEN SATURATION: 98 %

## 2020-08-03 DIAGNOSIS — S20.219A CONTUSION OF CHEST WALL, UNSPECIFIED LATERALITY, INITIAL ENCOUNTER: Primary | ICD-10-CM
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
## 2020-08-03 DIAGNOSIS — S39.012A BACK STRAIN, INITIAL ENCOUNTER: ICD-10-CM

## 2020-08-03 LAB
B-HCG UR QL: NEGATIVE
CTP QC/QA: YES

## 2020-08-03 PROCEDURE — 99284 EMERGENCY DEPT VISIT MOD MDM: CPT | Mod: ,,, | Performed by: PHYSICIAN ASSISTANT

## 2020-08-03 PROCEDURE — 81025 URINE PREGNANCY TEST: CPT | Performed by: PHYSICIAN ASSISTANT

## 2020-08-03 PROCEDURE — 99284 EMERGENCY DEPT VISIT MOD MDM: CPT | Mod: 25

## 2020-08-03 PROCEDURE — 99284 PR EMERGENCY DEPT VISIT,LEVEL IV: ICD-10-PCS | Mod: ,,, | Performed by: PHYSICIAN ASSISTANT

## 2020-08-03 PROCEDURE — 25000003 PHARM REV CODE 250: Performed by: PHYSICIAN ASSISTANT

## 2020-08-03 RX ORDER — METHOCARBAMOL 500 MG/1
500 TABLET, FILM COATED ORAL
Status: COMPLETED | OUTPATIENT
Start: 2020-08-03 | End: 2020-08-03

## 2020-08-03 RX ORDER — NAPROXEN 500 MG/1
500 TABLET ORAL 2 TIMES DAILY WITH MEALS
Qty: 20 TABLET | Refills: 0 | Status: SHIPPED | OUTPATIENT
Start: 2020-08-03 | End: 2021-02-15

## 2020-08-03 RX ORDER — METHOCARBAMOL 500 MG/1
1000 TABLET, FILM COATED ORAL 3 TIMES DAILY
Qty: 30 TABLET | Refills: 0 | Status: SHIPPED | OUTPATIENT
Start: 2020-08-03 | End: 2020-08-12

## 2020-08-03 RX ORDER — NAPROXEN 500 MG/1
500 TABLET ORAL
Status: COMPLETED | OUTPATIENT
Start: 2020-08-03 | End: 2020-08-03

## 2020-08-03 RX ADMIN — NAPROXEN 500 MG: 500 TABLET ORAL at 03:08

## 2020-08-03 RX ADMIN — METHOCARBAMOL TABLETS 500 MG: 500 TABLET, COATED ORAL at 03:08
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
## 2020-08-03 NOTE — ED NOTES
Patient identifiers verified and correct for Ana Wharton  LOC: The patient is awake, alert and aware of environment with an appropriate affect, the patient is oriented x 3 and speaking appropriately.   APPEARANCE: Patient appears comfortable and in no acute distress, patient is clean and well groomed.  SKIN: The skin is warm and dry, color consistent with ethnicity, patient has normal skin turgor and moist mucus membranes, skin intact, no breakdown or bruising noted.   MUSCULOSKELETAL: Patient moving all extremities spontaneously, no swelling noted. Middle back pain  RESPIRATORY: Airway is open and patent, respirations are spontaneous, patient has a normal effort and rate, no accessory muscle use noted, O2 sats noted at 97% on room air.  CARDIAC: Denies chest pain no edema noted, capillary refill < 3 seconds.   GASTRO: Soft and non tender to palpation, no distention noted, normoactive bowel sounds present in all four quadrants. Pt states bowel movements have been regular.  : Pt denies any pain or frequency with urination.  NEURO: Pt opens eyes spontaneously, behavior appropriate to situation, follows commands, facial expression symmetrical, bilateral hand grasp equal and even, purposeful motor response noted, normal sensation in all extremities when touched with a finger.
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
## 2020-09-03 ENCOUNTER — OFFICE VISIT (OUTPATIENT)
Dept: PODIATRY | Facility: CLINIC | Age: 40
End: 2020-09-03
Payer: COMMERCIAL

## 2020-09-03 VITALS
HEART RATE: 78 BPM | WEIGHT: 232.81 LBS | DIASTOLIC BLOOD PRESSURE: 90 MMHG | BODY MASS INDEX: 41.25 KG/M2 | SYSTOLIC BLOOD PRESSURE: 131 MMHG | HEIGHT: 63 IN

## 2020-09-03 DIAGNOSIS — B35.1 ONYCHOMYCOSIS DUE TO DERMATOPHYTE: Primary | ICD-10-CM

## 2020-09-03 PROCEDURE — 3080F PR MOST RECENT DIASTOLIC BLOOD PRESSURE >= 90 MM HG: ICD-10-PCS | Mod: CPTII,S$GLB,, | Performed by: PODIATRIST

## 2020-09-03 PROCEDURE — 3008F BODY MASS INDEX DOCD: CPT | Mod: CPTII,S$GLB,, | Performed by: PODIATRIST

## 2020-09-03 PROCEDURE — 3075F PR MOST RECENT SYSTOLIC BLOOD PRESS GE 130-139MM HG: ICD-10-PCS | Mod: CPTII,S$GLB,, | Performed by: PODIATRIST

## 2020-09-03 PROCEDURE — 3080F DIAST BP >= 90 MM HG: CPT | Mod: CPTII,S$GLB,, | Performed by: PODIATRIST

## 2020-09-03 PROCEDURE — 99999 PR PBB SHADOW E&M-EST. PATIENT-LVL III: ICD-10-PCS | Mod: PBBFAC,,, | Performed by: PODIATRIST

## 2020-09-03 PROCEDURE — 99203 OFFICE O/P NEW LOW 30 MIN: CPT | Mod: S$GLB,,, | Performed by: PODIATRIST

## 2020-09-03 PROCEDURE — 3075F SYST BP GE 130 - 139MM HG: CPT | Mod: CPTII,S$GLB,, | Performed by: PODIATRIST

## 2020-09-03 PROCEDURE — 3008F PR BODY MASS INDEX (BMI) DOCUMENTED: ICD-10-PCS | Mod: CPTII,S$GLB,, | Performed by: PODIATRIST

## 2020-09-03 PROCEDURE — 99203 PR OFFICE/OUTPT VISIT, NEW, LEVL III, 30-44 MIN: ICD-10-PCS | Mod: S$GLB,,, | Performed by: PODIATRIST

## 2020-09-03 PROCEDURE — 99999 PR PBB SHADOW E&M-EST. PATIENT-LVL III: CPT | Mod: PBBFAC,,, | Performed by: PODIATRIST

## 2020-09-03 NOTE — LETTER
September 3, 2020      [id number]  [address]  [facility] [address]  Phone: [phone]       Patient: Ana Wharton   YOB: 1980  Date of Visit: 09/03/2020    To Whom It May Concern:    Cruz Wharton  was at [facility] on 09/03/2020. Was seen in Podiatry clinic today and may return to work without restrictions. If you have any questions or concerns, or if I can be of further assistance, please do not hesitate to contact me.    Sincerely,    Eugene Simms MA

## 2020-09-04 RX ORDER — TERBINAFINE HYDROCHLORIDE 250 MG/1
250 TABLET ORAL DAILY
Qty: 30 TABLET | Refills: 3 | Status: SHIPPED | OUTPATIENT
Start: 2020-09-04 | End: 2022-07-27

## 2020-09-17 NOTE — PROGRESS NOTES
Subjective:      Patient ID: Ana Wharton is a 40 y.o. female.    Chief Complaint: Heel Pain (LEFT FOOT HEEL PAIN ) and Nail Problem (TOE FUNGUS ON BOTH GREAT TOE )    Ana is a 40 y.o. female who presents to the clinic complaining of thick and discolored toenails on both feet. Ana is inquiring about treatment options.      Review of Systems   Skin: Positive for color change and nail changes.           Objective:      Physical Exam  Cardiovascular:      Pulses:           Dorsalis pedis pulses are 2+ on the right side and 2+ on the left side.        Posterior tibial pulses are 2+ on the right side and 2+ on the left side.      Comments: No edema noted to b/L LEs  Musculoskeletal:      Comments: Adequate joint ROM noted to all lower extremity muscle groups with no pain or crepitation noted. Muscle strength is 5/5 in all groups bilaterally.     Feet:      Right foot:      Protective Sensation: 5 sites tested. 5 sites sensed.      Left foot:      Protective Sensation: 5 sites tested. 5 sites sensed.   Skin:     General: Skin is warm.      Capillary Refill: Capillary refill takes 2 to 3 seconds.      Comments: B/L hallux nails thickened, dystrophic   Neurological:      Comments: Intact gross sensation noted to b/L LEs               Assessment:       Encounter Diagnosis   Name Primary?    Onychomycosis due to dermatophyte Yes         Plan:       Ana was seen today for heel pain and nail problem.    Diagnoses and all orders for this visit:    Onychomycosis due to dermatophyte  -     Hepatic function panel; Future    Other orders  -     terbinafine HCL (LAMISIL) 250 mg tablet; Take 1 tablet (250 mg total) by mouth once daily.      I counseled the patient on her conditions, their implications and medical management.    We discussed using Lamisil for onychomycosis. This drug offers a fairly high but not universal cure rate. A 12 week treatment course is recommended. The patient is aware that rare cases of  liver injury have been reported; and agrees to come in for liver function tests at 6 weeks of treatment. The symptoms of liver disease have been discussed; call if such occurs. In addition, some insurance plans do not cover the expense of this drug for treating a cosmetic condition, and the patient understands they may have to pay for the medication. Other side effects, such as headaches and rashes, have also been discussed.  All questions asked  Hepatic function ordered, WNL  Rx lamisil 250mg daily    .

## 2020-10-06 ENCOUNTER — PATIENT MESSAGE (OUTPATIENT)
Dept: FAMILY MEDICINE | Facility: CLINIC | Age: 40
End: 2020-10-06

## 2021-02-15 ENCOUNTER — OFFICE VISIT (OUTPATIENT)
Dept: FAMILY MEDICINE | Facility: CLINIC | Age: 41
End: 2021-02-15
Payer: COMMERCIAL

## 2021-02-15 VITALS
HEART RATE: 76 BPM | DIASTOLIC BLOOD PRESSURE: 86 MMHG | OXYGEN SATURATION: 99 % | WEIGHT: 231.5 LBS | HEIGHT: 63 IN | TEMPERATURE: 98 F | BODY MASS INDEX: 41.02 KG/M2 | SYSTOLIC BLOOD PRESSURE: 122 MMHG

## 2021-02-15 DIAGNOSIS — E05.90 HYPERTHYROIDISM: ICD-10-CM

## 2021-02-15 DIAGNOSIS — E66.01 CLASS 3 SEVERE OBESITY WITH BODY MASS INDEX (BMI) OF 40.0 TO 44.9 IN ADULT, UNSPECIFIED OBESITY TYPE, UNSPECIFIED WHETHER SERIOUS COMORBIDITY PRESENT: ICD-10-CM

## 2021-02-15 DIAGNOSIS — Z12.39 ENCOUNTER FOR SCREENING FOR MALIGNANT NEOPLASM OF BREAST, UNSPECIFIED SCREENING MODALITY: ICD-10-CM

## 2021-02-15 DIAGNOSIS — I10 ESSENTIAL HYPERTENSION: ICD-10-CM

## 2021-02-15 DIAGNOSIS — Z00.00 HEALTHCARE MAINTENANCE: ICD-10-CM

## 2021-02-15 DIAGNOSIS — Z00.00 ANNUAL PHYSICAL EXAM: Primary | ICD-10-CM

## 2021-02-15 PROBLEM — E66.813 CLASS 3 SEVERE OBESITY WITH BODY MASS INDEX (BMI) OF 40.0 TO 44.9 IN ADULT: Status: ACTIVE | Noted: 2021-02-15

## 2021-02-15 PROCEDURE — 1126F PR PAIN SEVERITY QUANTIFIED, NO PAIN PRESENT: ICD-10-PCS | Mod: S$GLB,,, | Performed by: INTERNAL MEDICINE

## 2021-02-15 PROCEDURE — 3008F PR BODY MASS INDEX (BMI) DOCUMENTED: ICD-10-PCS | Mod: CPTII,S$GLB,, | Performed by: INTERNAL MEDICINE

## 2021-02-15 PROCEDURE — 3074F SYST BP LT 130 MM HG: CPT | Mod: CPTII,S$GLB,, | Performed by: INTERNAL MEDICINE

## 2021-02-15 PROCEDURE — 99999 PR PBB SHADOW E&M-EST. PATIENT-LVL IV: CPT | Mod: PBBFAC,,, | Performed by: INTERNAL MEDICINE

## 2021-02-15 PROCEDURE — 99204 PR OFFICE/OUTPT VISIT, NEW, LEVL IV, 45-59 MIN: ICD-10-PCS | Mod: S$GLB,,, | Performed by: INTERNAL MEDICINE

## 2021-02-15 PROCEDURE — 1126F AMNT PAIN NOTED NONE PRSNT: CPT | Mod: S$GLB,,, | Performed by: INTERNAL MEDICINE

## 2021-02-15 PROCEDURE — 99999 PR PBB SHADOW E&M-EST. PATIENT-LVL IV: ICD-10-PCS | Mod: PBBFAC,,, | Performed by: INTERNAL MEDICINE

## 2021-02-15 PROCEDURE — 3008F BODY MASS INDEX DOCD: CPT | Mod: CPTII,S$GLB,, | Performed by: INTERNAL MEDICINE

## 2021-02-15 PROCEDURE — 3079F DIAST BP 80-89 MM HG: CPT | Mod: CPTII,S$GLB,, | Performed by: INTERNAL MEDICINE

## 2021-02-15 PROCEDURE — 99204 OFFICE O/P NEW MOD 45 MIN: CPT | Mod: S$GLB,,, | Performed by: INTERNAL MEDICINE

## 2021-02-15 PROCEDURE — 3074F PR MOST RECENT SYSTOLIC BLOOD PRESSURE < 130 MM HG: ICD-10-PCS | Mod: CPTII,S$GLB,, | Performed by: INTERNAL MEDICINE

## 2021-02-15 PROCEDURE — 3079F PR MOST RECENT DIASTOLIC BLOOD PRESSURE 80-89 MM HG: ICD-10-PCS | Mod: CPTII,S$GLB,, | Performed by: INTERNAL MEDICINE

## 2021-02-15 RX ORDER — CLINDAMYCIN HYDROCHLORIDE 300 MG/1
CAPSULE ORAL
COMMUNITY
Start: 2020-11-12 | End: 2021-02-15

## 2021-02-18 ENCOUNTER — LAB VISIT (OUTPATIENT)
Dept: LAB | Facility: HOSPITAL | Age: 41
End: 2021-02-18
Attending: INTERNAL MEDICINE
Payer: COMMERCIAL

## 2021-02-18 DIAGNOSIS — Z00.00 HEALTHCARE MAINTENANCE: ICD-10-CM

## 2021-02-18 LAB
25(OH)D3+25(OH)D2 SERPL-MCNC: 22 NG/ML (ref 30–96)
ALBUMIN SERPL BCP-MCNC: 3.6 G/DL (ref 3.5–5.2)
ALP SERPL-CCNC: 63 U/L (ref 55–135)
ALT SERPL W/O P-5'-P-CCNC: 20 U/L (ref 10–44)
ANION GAP SERPL CALC-SCNC: 6 MMOL/L (ref 8–16)
AST SERPL-CCNC: 20 U/L (ref 10–40)
BASOPHILS # BLD AUTO: 0.03 K/UL (ref 0–0.2)
BASOPHILS NFR BLD: 0.4 % (ref 0–1.9)
BILIRUB SERPL-MCNC: 0.5 MG/DL (ref 0.1–1)
BUN SERPL-MCNC: 12 MG/DL (ref 6–20)
CALCIUM SERPL-MCNC: 8.7 MG/DL (ref 8.7–10.5)
CHLORIDE SERPL-SCNC: 104 MMOL/L (ref 95–110)
CHOLEST SERPL-MCNC: 113 MG/DL (ref 120–199)
CHOLEST/HDLC SERPL: 3.3 {RATIO} (ref 2–5)
CO2 SERPL-SCNC: 28 MMOL/L (ref 23–29)
CREAT SERPL-MCNC: 0.8 MG/DL (ref 0.5–1.4)
DIFFERENTIAL METHOD: ABNORMAL
EOSINOPHIL # BLD AUTO: 0.1 K/UL (ref 0–0.5)
EOSINOPHIL NFR BLD: 1.7 % (ref 0–8)
ERYTHROCYTE [DISTWIDTH] IN BLOOD BY AUTOMATED COUNT: 13.8 % (ref 11.5–14.5)
EST. GFR  (AFRICAN AMERICAN): >60 ML/MIN/1.73 M^2
EST. GFR  (NON AFRICAN AMERICAN): >60 ML/MIN/1.73 M^2
ESTIMATED AVG GLUCOSE: 94 MG/DL (ref 68–131)
GLUCOSE SERPL-MCNC: 102 MG/DL (ref 70–110)
HBA1C MFR BLD: 4.9 % (ref 4–5.6)
HCT VFR BLD AUTO: 35.7 % (ref 37–48.5)
HDLC SERPL-MCNC: 34 MG/DL (ref 40–75)
HDLC SERPL: 30.1 % (ref 20–50)
HGB BLD-MCNC: 11.7 G/DL (ref 12–16)
IMM GRANULOCYTES # BLD AUTO: 0.01 K/UL (ref 0–0.04)
IMM GRANULOCYTES NFR BLD AUTO: 0.1 % (ref 0–0.5)
LDLC SERPL CALC-MCNC: 63.6 MG/DL (ref 63–159)
LYMPHOCYTES # BLD AUTO: 2.1 K/UL (ref 1–4.8)
LYMPHOCYTES NFR BLD: 28.6 % (ref 18–48)
MCH RBC QN AUTO: 29.6 PG (ref 27–31)
MCHC RBC AUTO-ENTMCNC: 32.8 G/DL (ref 32–36)
MCV RBC AUTO: 90 FL (ref 82–98)
MONOCYTES # BLD AUTO: 0.9 K/UL (ref 0.3–1)
MONOCYTES NFR BLD: 12 % (ref 4–15)
NEUTROPHILS # BLD AUTO: 4.1 K/UL (ref 1.8–7.7)
NEUTROPHILS NFR BLD: 57.2 % (ref 38–73)
NONHDLC SERPL-MCNC: 79 MG/DL
NRBC BLD-RTO: 0 /100 WBC
PLATELET # BLD AUTO: 311 K/UL (ref 150–350)
PMV BLD AUTO: 10.6 FL (ref 9.2–12.9)
POTASSIUM SERPL-SCNC: 4.1 MMOL/L (ref 3.5–5.1)
PROT SERPL-MCNC: 7.3 G/DL (ref 6–8.4)
RBC # BLD AUTO: 3.95 M/UL (ref 4–5.4)
SODIUM SERPL-SCNC: 138 MMOL/L (ref 136–145)
T4 FREE SERPL-MCNC: 0.88 NG/DL (ref 0.71–1.51)
TRIGL SERPL-MCNC: 77 MG/DL (ref 30–150)
TSH SERPL DL<=0.005 MIU/L-ACNC: 2.16 UIU/ML (ref 0.4–4)
WBC # BLD AUTO: 7.16 K/UL (ref 3.9–12.7)

## 2021-02-18 PROCEDURE — 36415 COLL VENOUS BLD VENIPUNCTURE: CPT

## 2021-02-18 PROCEDURE — 84443 ASSAY THYROID STIM HORMONE: CPT

## 2021-02-18 PROCEDURE — 80061 LIPID PANEL: CPT

## 2021-02-18 PROCEDURE — 84439 ASSAY OF FREE THYROXINE: CPT

## 2021-02-18 PROCEDURE — 82306 VITAMIN D 25 HYDROXY: CPT

## 2021-02-18 PROCEDURE — 83036 HEMOGLOBIN GLYCOSYLATED A1C: CPT

## 2021-02-18 PROCEDURE — 80053 COMPREHEN METABOLIC PANEL: CPT

## 2021-02-18 PROCEDURE — 85025 COMPLETE CBC W/AUTO DIFF WBC: CPT

## 2021-02-19 DIAGNOSIS — E05.90 HYPERTHYROIDISM: Primary | ICD-10-CM

## 2021-02-19 DIAGNOSIS — D64.9 ANEMIA, UNSPECIFIED TYPE: ICD-10-CM

## 2021-02-22 ENCOUNTER — HOSPITAL ENCOUNTER (OUTPATIENT)
Dept: RADIOLOGY | Facility: HOSPITAL | Age: 41
Discharge: HOME | End: 2021-02-22
Attending: INTERNAL MEDICINE
Payer: COMMERCIAL

## 2021-02-22 VITALS — BODY MASS INDEX: 40.92 KG/M2 | WEIGHT: 231 LBS

## 2021-02-22 DIAGNOSIS — Z00.00 HEALTHCARE MAINTENANCE: ICD-10-CM

## 2021-02-22 DIAGNOSIS — Z12.39 ENCOUNTER FOR SCREENING FOR MALIGNANT NEOPLASM OF BREAST, UNSPECIFIED SCREENING MODALITY: ICD-10-CM

## 2021-02-22 PROCEDURE — 77063 MAMMO DIGITAL SCREENING BILAT WITH TOMO: ICD-10-PCS | Mod: 26,,, | Performed by: RADIOLOGY

## 2021-02-22 PROCEDURE — 77067 MAMMO DIGITAL SCREENING BILAT WITH TOMO: ICD-10-PCS | Mod: 26,,, | Performed by: RADIOLOGY

## 2021-02-22 PROCEDURE — 77067 SCR MAMMO BI INCL CAD: CPT | Mod: 26,,, | Performed by: RADIOLOGY

## 2021-02-22 PROCEDURE — 77063 BREAST TOMOSYNTHESIS BI: CPT | Mod: 26,,, | Performed by: RADIOLOGY

## 2021-02-22 PROCEDURE — 77067 SCR MAMMO BI INCL CAD: CPT | Mod: TC

## 2021-02-23 ENCOUNTER — TELEPHONE (OUTPATIENT)
Dept: FAMILY MEDICINE | Facility: CLINIC | Age: 41
End: 2021-02-23

## 2021-03-08 ENCOUNTER — OFFICE VISIT (OUTPATIENT)
Dept: ENDOCRINOLOGY | Facility: CLINIC | Age: 41
End: 2021-03-08
Payer: COMMERCIAL

## 2021-03-08 VITALS
HEART RATE: 76 BPM | TEMPERATURE: 99 F | WEIGHT: 230 LBS | SYSTOLIC BLOOD PRESSURE: 122 MMHG | BODY MASS INDEX: 40.74 KG/M2 | DIASTOLIC BLOOD PRESSURE: 81 MMHG

## 2021-03-08 DIAGNOSIS — E05.90 HYPERTHYROIDISM: ICD-10-CM

## 2021-03-08 DIAGNOSIS — E04.9 GOITER: Primary | ICD-10-CM

## 2021-03-08 PROCEDURE — 3008F PR BODY MASS INDEX (BMI) DOCUMENTED: ICD-10-PCS | Mod: CPTII,S$GLB,, | Performed by: HOSPITALIST

## 2021-03-08 PROCEDURE — 99999 PR PBB SHADOW E&M-EST. PATIENT-LVL III: CPT | Mod: PBBFAC,,, | Performed by: HOSPITALIST

## 2021-03-08 PROCEDURE — 3008F BODY MASS INDEX DOCD: CPT | Mod: CPTII,S$GLB,, | Performed by: HOSPITALIST

## 2021-03-08 PROCEDURE — 99204 OFFICE O/P NEW MOD 45 MIN: CPT | Mod: S$GLB,,, | Performed by: HOSPITALIST

## 2021-03-08 PROCEDURE — 99999 PR PBB SHADOW E&M-EST. PATIENT-LVL III: ICD-10-PCS | Mod: PBBFAC,,, | Performed by: HOSPITALIST

## 2021-03-08 PROCEDURE — 3079F DIAST BP 80-89 MM HG: CPT | Mod: CPTII,S$GLB,, | Performed by: HOSPITALIST

## 2021-03-08 PROCEDURE — 1126F PR PAIN SEVERITY QUANTIFIED, NO PAIN PRESENT: ICD-10-PCS | Mod: S$GLB,,, | Performed by: HOSPITALIST

## 2021-03-08 PROCEDURE — 3074F PR MOST RECENT SYSTOLIC BLOOD PRESSURE < 130 MM HG: ICD-10-PCS | Mod: CPTII,S$GLB,, | Performed by: HOSPITALIST

## 2021-03-08 PROCEDURE — 3074F SYST BP LT 130 MM HG: CPT | Mod: CPTII,S$GLB,, | Performed by: HOSPITALIST

## 2021-03-08 PROCEDURE — 1126F AMNT PAIN NOTED NONE PRSNT: CPT | Mod: S$GLB,,, | Performed by: HOSPITALIST

## 2021-03-08 PROCEDURE — 3079F PR MOST RECENT DIASTOLIC BLOOD PRESSURE 80-89 MM HG: ICD-10-PCS | Mod: CPTII,S$GLB,, | Performed by: HOSPITALIST

## 2021-03-08 PROCEDURE — 99204 PR OFFICE/OUTPT VISIT, NEW, LEVL IV, 45-59 MIN: ICD-10-PCS | Mod: S$GLB,,, | Performed by: HOSPITALIST

## 2021-03-08 RX ORDER — METHIMAZOLE 5 MG/1
TABLET ORAL
Qty: 45 TABLET | Refills: 3 | Status: SHIPPED | OUTPATIENT
Start: 2021-03-08 | End: 2021-06-10

## 2021-03-23 DIAGNOSIS — I10 ESSENTIAL (PRIMARY) HYPERTENSION: ICD-10-CM

## 2021-03-23 RX ORDER — LISINOPRIL AND HYDROCHLOROTHIAZIDE 12.5; 2 MG/1; MG/1
TABLET ORAL
Qty: 60 TABLET | Refills: 11 | Status: CANCELLED | OUTPATIENT
Start: 2021-03-23

## 2021-03-24 ENCOUNTER — PATIENT MESSAGE (OUTPATIENT)
Dept: FAMILY MEDICINE | Facility: CLINIC | Age: 41
End: 2021-03-24

## 2021-03-24 ENCOUNTER — TELEPHONE (OUTPATIENT)
Dept: FAMILY MEDICINE | Facility: CLINIC | Age: 41
End: 2021-03-24

## 2021-03-24 DIAGNOSIS — I10 ESSENTIAL (PRIMARY) HYPERTENSION: ICD-10-CM

## 2021-03-24 RX ORDER — LISINOPRIL AND HYDROCHLOROTHIAZIDE 12.5; 2 MG/1; MG/1
2 TABLET ORAL DAILY
Qty: 180 TABLET | Refills: 1 | Status: SHIPPED | OUTPATIENT
Start: 2021-03-24 | End: 2021-03-25 | Stop reason: SDUPTHER

## 2021-03-25 DIAGNOSIS — I10 ESSENTIAL (PRIMARY) HYPERTENSION: ICD-10-CM

## 2021-03-29 RX ORDER — LISINOPRIL AND HYDROCHLOROTHIAZIDE 12.5; 2 MG/1; MG/1
2 TABLET ORAL DAILY
Qty: 180 TABLET | Refills: 1 | Status: SHIPPED | OUTPATIENT
Start: 2021-03-29 | End: 2022-02-01

## 2021-04-16 ENCOUNTER — PATIENT MESSAGE (OUTPATIENT)
Dept: RESEARCH | Facility: HOSPITAL | Age: 41
End: 2021-04-16

## 2021-04-16 ENCOUNTER — DOCUMENTATION ONLY (OUTPATIENT)
Dept: BARIATRICS | Facility: CLINIC | Age: 41
End: 2021-04-16

## 2021-04-19 ENCOUNTER — TELEPHONE (OUTPATIENT)
Dept: BARIATRICS | Facility: CLINIC | Age: 41
End: 2021-04-19

## 2021-04-21 ENCOUNTER — OFFICE VISIT (OUTPATIENT)
Dept: PLASTIC SURGERY | Facility: CLINIC | Age: 41
End: 2021-04-21
Payer: COMMERCIAL

## 2021-04-21 VITALS
HEART RATE: 88 BPM | BODY MASS INDEX: 40.75 KG/M2 | WEIGHT: 230 LBS | HEIGHT: 63 IN | SYSTOLIC BLOOD PRESSURE: 164 MMHG | DIASTOLIC BLOOD PRESSURE: 83 MMHG

## 2021-04-21 DIAGNOSIS — Z41.1 ENCOUNTER FOR COSMETIC PROCEDURE: ICD-10-CM

## 2021-04-21 DIAGNOSIS — L98.7 EXCESS SKIN: Primary | ICD-10-CM

## 2021-04-21 PROCEDURE — 1126F AMNT PAIN NOTED NONE PRSNT: CPT | Mod: S$GLB,,, | Performed by: SURGERY

## 2021-04-21 PROCEDURE — 1126F PR PAIN SEVERITY QUANTIFIED, NO PAIN PRESENT: ICD-10-PCS | Mod: S$GLB,,, | Performed by: SURGERY

## 2021-04-21 PROCEDURE — 99999 PR PBB SHADOW E&M-EST. PATIENT-LVL III: CPT | Mod: PBBFAC,,, | Performed by: SURGERY

## 2021-04-21 PROCEDURE — 99203 PR OFFICE/OUTPT VISIT, NEW, LEVL III, 30-44 MIN: ICD-10-PCS | Mod: CSM,S$GLB,, | Performed by: SURGERY

## 2021-04-21 PROCEDURE — 99203 OFFICE O/P NEW LOW 30 MIN: CPT | Mod: CSM,S$GLB,, | Performed by: SURGERY

## 2021-04-21 PROCEDURE — 99999 PR PBB SHADOW E&M-EST. PATIENT-LVL III: ICD-10-PCS | Mod: PBBFAC,,, | Performed by: SURGERY

## 2021-04-21 PROCEDURE — 3008F PR BODY MASS INDEX (BMI) DOCUMENTED: ICD-10-PCS | Mod: CPTII,S$GLB,, | Performed by: SURGERY

## 2021-04-21 PROCEDURE — 3008F BODY MASS INDEX DOCD: CPT | Mod: CPTII,S$GLB,, | Performed by: SURGERY

## 2021-04-22 ENCOUNTER — TELEPHONE (OUTPATIENT)
Dept: BARIATRICS | Facility: CLINIC | Age: 41
End: 2021-04-22

## 2021-05-06 ENCOUNTER — PATIENT MESSAGE (OUTPATIENT)
Dept: FAMILY MEDICINE | Facility: CLINIC | Age: 41
End: 2021-05-06

## 2021-05-10 ENCOUNTER — TELEPHONE (OUTPATIENT)
Dept: FAMILY MEDICINE | Facility: CLINIC | Age: 41
End: 2021-05-10

## 2021-05-10 ENCOUNTER — HOSPITAL ENCOUNTER (OUTPATIENT)
Dept: RADIOLOGY | Facility: HOSPITAL | Age: 41
Discharge: HOME | End: 2021-05-10
Attending: HOSPITALIST
Payer: COMMERCIAL

## 2021-05-10 DIAGNOSIS — E04.9 GOITER: ICD-10-CM

## 2021-05-10 DIAGNOSIS — E05.90 HYPERTHYROIDISM: ICD-10-CM

## 2021-05-10 PROCEDURE — 76536 US SOFT TISSUE HEAD NECK THYROID: ICD-10-PCS | Mod: 26,,, | Performed by: RADIOLOGY

## 2021-05-10 PROCEDURE — 76536 US EXAM OF HEAD AND NECK: CPT | Mod: TC

## 2021-05-10 PROCEDURE — 76536 US EXAM OF HEAD AND NECK: CPT | Mod: 26,,, | Performed by: RADIOLOGY

## 2021-05-11 ENCOUNTER — OFFICE VISIT (OUTPATIENT)
Dept: PSYCHIATRY | Facility: CLINIC | Age: 41
End: 2021-05-11
Payer: COMMERCIAL

## 2021-05-11 ENCOUNTER — OFFICE VISIT (OUTPATIENT)
Dept: FAMILY MEDICINE | Facility: CLINIC | Age: 41
End: 2021-05-11
Payer: COMMERCIAL

## 2021-05-11 VITALS
HEART RATE: 67 BPM | RESPIRATION RATE: 17 BRPM | SYSTOLIC BLOOD PRESSURE: 124 MMHG | OXYGEN SATURATION: 97 % | BODY MASS INDEX: 41.51 KG/M2 | WEIGHT: 234.38 LBS | DIASTOLIC BLOOD PRESSURE: 72 MMHG

## 2021-05-11 DIAGNOSIS — F41.9 ANXIETY: Primary | ICD-10-CM

## 2021-05-11 DIAGNOSIS — R69 DIAGNOSIS DEFERRED: Primary | ICD-10-CM

## 2021-05-11 PROCEDURE — 99213 OFFICE O/P EST LOW 20 MIN: CPT | Mod: S$GLB,,, | Performed by: INTERNAL MEDICINE

## 2021-05-11 PROCEDURE — 90791 PR PSYCHIATRIC DIAGNOSTIC EVALUATION: ICD-10-PCS | Mod: S$GLB,,, | Performed by: SOCIAL WORKER

## 2021-05-11 PROCEDURE — 99999 PR PBB SHADOW E&M-EST. PATIENT-LVL III: ICD-10-PCS | Mod: PBBFAC,,, | Performed by: INTERNAL MEDICINE

## 2021-05-11 PROCEDURE — 99999 PR PBB SHADOW E&M-EST. PATIENT-LVL III: CPT | Mod: PBBFAC,,, | Performed by: INTERNAL MEDICINE

## 2021-05-11 PROCEDURE — 99213 PR OFFICE/OUTPT VISIT, EST, LEVL III, 20-29 MIN: ICD-10-PCS | Mod: S$GLB,,, | Performed by: INTERNAL MEDICINE

## 2021-05-11 PROCEDURE — 3008F PR BODY MASS INDEX (BMI) DOCUMENTED: ICD-10-PCS | Mod: CPTII,S$GLB,, | Performed by: INTERNAL MEDICINE

## 2021-05-11 PROCEDURE — 3008F BODY MASS INDEX DOCD: CPT | Mod: CPTII,S$GLB,, | Performed by: INTERNAL MEDICINE

## 2021-05-11 PROCEDURE — 90791 PSYCH DIAGNOSTIC EVALUATION: CPT | Mod: S$GLB,,, | Performed by: SOCIAL WORKER

## 2021-05-12 PROBLEM — R69 DIAGNOSIS DEFERRED: Status: ACTIVE | Noted: 2021-05-12

## 2021-06-10 ENCOUNTER — OFFICE VISIT (OUTPATIENT)
Dept: ENDOCRINOLOGY | Facility: CLINIC | Age: 41
End: 2021-06-10
Payer: COMMERCIAL

## 2021-06-10 ENCOUNTER — OFFICE VISIT (OUTPATIENT)
Dept: BARIATRICS | Facility: CLINIC | Age: 41
End: 2021-06-10
Payer: COMMERCIAL

## 2021-06-10 VITALS
SYSTOLIC BLOOD PRESSURE: 118 MMHG | HEIGHT: 64 IN | OXYGEN SATURATION: 98 % | BODY MASS INDEX: 38.77 KG/M2 | WEIGHT: 227.13 LBS | DIASTOLIC BLOOD PRESSURE: 70 MMHG | HEART RATE: 67 BPM

## 2021-06-10 VITALS
TEMPERATURE: 99 F | HEART RATE: 67 BPM | SYSTOLIC BLOOD PRESSURE: 118 MMHG | DIASTOLIC BLOOD PRESSURE: 70 MMHG | BODY MASS INDEX: 40.35 KG/M2 | WEIGHT: 227.81 LBS

## 2021-06-10 DIAGNOSIS — I10 ESSENTIAL HYPERTENSION: ICD-10-CM

## 2021-06-10 DIAGNOSIS — E05.90 HYPERTHYROIDISM: Primary | ICD-10-CM

## 2021-06-10 DIAGNOSIS — E66.01 CLASS 2 SEVERE OBESITY WITH BODY MASS INDEX (BMI) OF 35 TO 39.9 WITH SERIOUS COMORBIDITY: Primary | ICD-10-CM

## 2021-06-10 DIAGNOSIS — E05.90 HYPERTHYROIDISM: ICD-10-CM

## 2021-06-10 DIAGNOSIS — E06.3 HASHIMOTO'S THYROIDITIS: ICD-10-CM

## 2021-06-10 PROCEDURE — 99999 PR PBB SHADOW E&M-EST. PATIENT-LVL III: CPT | Mod: PBBFAC,,, | Performed by: INTERNAL MEDICINE

## 2021-06-10 PROCEDURE — 1126F PR PAIN SEVERITY QUANTIFIED, NO PAIN PRESENT: ICD-10-PCS | Mod: S$GLB,,, | Performed by: HOSPITALIST

## 2021-06-10 PROCEDURE — 99999 PR PBB SHADOW E&M-EST. PATIENT-LVL III: ICD-10-PCS | Mod: PBBFAC,,, | Performed by: HOSPITALIST

## 2021-06-10 PROCEDURE — 1126F AMNT PAIN NOTED NONE PRSNT: CPT | Mod: S$GLB,,, | Performed by: HOSPITALIST

## 2021-06-10 PROCEDURE — 99999 PR PBB SHADOW E&M-EST. PATIENT-LVL III: CPT | Mod: PBBFAC,,, | Performed by: HOSPITALIST

## 2021-06-10 PROCEDURE — 99999 PR PBB SHADOW E&M-EST. PATIENT-LVL III: ICD-10-PCS | Mod: PBBFAC,,, | Performed by: INTERNAL MEDICINE

## 2021-06-10 PROCEDURE — 99213 PR OFFICE/OUTPT VISIT, EST, LEVL III, 20-29 MIN: ICD-10-PCS | Mod: S$GLB,,, | Performed by: HOSPITALIST

## 2021-06-10 PROCEDURE — 99215 OFFICE O/P EST HI 40 MIN: CPT | Mod: S$GLB,,, | Performed by: INTERNAL MEDICINE

## 2021-06-10 PROCEDURE — 99215 PR OFFICE/OUTPT VISIT, EST, LEVL V, 40-54 MIN: ICD-10-PCS | Mod: S$GLB,,, | Performed by: INTERNAL MEDICINE

## 2021-06-10 PROCEDURE — 99213 OFFICE O/P EST LOW 20 MIN: CPT | Mod: S$GLB,,, | Performed by: HOSPITALIST

## 2021-06-10 RX ORDER — TOPIRAMATE 25 MG/1
25 TABLET ORAL 2 TIMES DAILY
Qty: 60 TABLET | Refills: 1 | Status: SHIPPED | OUTPATIENT
Start: 2021-06-10 | End: 2021-08-05 | Stop reason: SDUPTHER

## 2021-08-03 ENCOUNTER — PATIENT OUTREACH (OUTPATIENT)
Dept: ADMINISTRATIVE | Facility: OTHER | Age: 41
End: 2021-08-03

## 2021-08-05 RX ORDER — TOPIRAMATE 25 MG/1
25 TABLET ORAL 2 TIMES DAILY
Qty: 60 TABLET | Refills: 0 | Status: SHIPPED | OUTPATIENT
Start: 2021-08-05 | End: 2022-08-05

## 2021-08-09 ENCOUNTER — TELEPHONE (OUTPATIENT)
Dept: FAMILY MEDICINE | Facility: CLINIC | Age: 41
End: 2021-08-09

## 2021-08-09 DIAGNOSIS — E55.9 VITAMIN D DEFICIENCY: ICD-10-CM

## 2021-08-09 DIAGNOSIS — I10 ESSENTIAL HYPERTENSION: Primary | ICD-10-CM

## 2021-08-09 DIAGNOSIS — D64.9 ANEMIA, UNSPECIFIED TYPE: ICD-10-CM

## 2021-08-09 DIAGNOSIS — E05.90 HYPERTHYROIDISM: ICD-10-CM

## 2021-11-13 LAB
25(OH)D3 SERPL-MCNC: 25 NG/ML (ref 30–100)
BASOPHILS # BLD AUTO: 19 CELLS/UL (ref 0–200)
BASOPHILS NFR BLD AUTO: 0.2 %
EOSINOPHIL # BLD AUTO: 84 CELLS/UL (ref 15–500)
EOSINOPHIL NFR BLD AUTO: 0.9 %
ERYTHROCYTE [DISTWIDTH] IN BLOOD BY AUTOMATED COUNT: 13.1 % (ref 11–15)
FERRITIN SERPL-MCNC: 5 NG/ML (ref 16–154)
HCT VFR BLD AUTO: 36.1 % (ref 35–45)
HGB BLD-MCNC: 11.4 G/DL (ref 11.7–15.5)
IRON SATN MFR SERPL: 24 % (CALC) (ref 16–45)
IRON SERPL-MCNC: 81 MCG/DL (ref 40–190)
LYMPHOCYTES # BLD AUTO: 2027 CELLS/UL (ref 850–3900)
LYMPHOCYTES NFR BLD AUTO: 21.8 %
MCH RBC QN AUTO: 28.4 PG (ref 27–33)
MCHC RBC AUTO-ENTMCNC: 31.6 G/DL (ref 32–36)
MCV RBC AUTO: 90 FL (ref 80–100)
MONOCYTES # BLD AUTO: 800 CELLS/UL (ref 200–950)
MONOCYTES NFR BLD AUTO: 8.6 %
NEUTROPHILS # BLD AUTO: 6371 CELLS/UL (ref 1500–7800)
NEUTROPHILS NFR BLD AUTO: 68.5 %
PLATELET # BLD AUTO: 294 THOUSAND/UL (ref 140–400)
PMV BLD REES-ECKER: 11.5 FL (ref 7.5–12.5)
RBC # BLD AUTO: 4.01 MILLION/UL (ref 3.8–5.1)
T4 FREE SERPL-MCNC: 0.8 NG/DL (ref 0.8–1.8)
THYROPEROXIDASE AB SERPL-ACNC: 60 IU/ML
TIBC SERPL-MCNC: 344 MCG/DL (CALC) (ref 250–450)
TSH SERPL-ACNC: 1.32 MIU/L
WBC # BLD AUTO: 9.3 THOUSAND/UL (ref 3.8–10.8)

## 2021-11-18 ENCOUNTER — PATIENT MESSAGE (OUTPATIENT)
Dept: ADMINISTRATIVE | Facility: OTHER | Age: 41
End: 2021-11-18
Payer: COMMERCIAL

## 2022-02-01 ENCOUNTER — OFFICE VISIT (OUTPATIENT)
Dept: FAMILY MEDICINE | Facility: CLINIC | Age: 42
End: 2022-02-01
Payer: COMMERCIAL

## 2022-02-01 VITALS
DIASTOLIC BLOOD PRESSURE: 82 MMHG | BODY MASS INDEX: 37.4 KG/M2 | OXYGEN SATURATION: 99 % | RESPIRATION RATE: 19 BRPM | WEIGHT: 214.5 LBS | SYSTOLIC BLOOD PRESSURE: 128 MMHG | HEART RATE: 82 BPM

## 2022-02-01 DIAGNOSIS — Z00.00 HEALTHCARE MAINTENANCE: ICD-10-CM

## 2022-02-01 DIAGNOSIS — E66.01 CLASS 3 SEVERE OBESITY WITH BODY MASS INDEX (BMI) OF 40.0 TO 44.9 IN ADULT, UNSPECIFIED OBESITY TYPE, UNSPECIFIED WHETHER SERIOUS COMORBIDITY PRESENT: ICD-10-CM

## 2022-02-01 DIAGNOSIS — D64.9 ANEMIA, UNSPECIFIED TYPE: ICD-10-CM

## 2022-02-01 DIAGNOSIS — E05.90 HYPERTHYROIDISM: ICD-10-CM

## 2022-02-01 DIAGNOSIS — I10 ESSENTIAL HYPERTENSION: Primary | ICD-10-CM

## 2022-02-01 DIAGNOSIS — Z12.39 ENCOUNTER FOR SCREENING FOR MALIGNANT NEOPLASM OF BREAST, UNSPECIFIED SCREENING MODALITY: ICD-10-CM

## 2022-02-01 PROCEDURE — 99214 PR OFFICE/OUTPT VISIT, EST, LEVL IV, 30-39 MIN: ICD-10-PCS | Mod: S$GLB,,, | Performed by: INTERNAL MEDICINE

## 2022-02-01 PROCEDURE — 99999 PR PBB SHADOW E&M-EST. PATIENT-LVL III: CPT | Mod: PBBFAC,,, | Performed by: INTERNAL MEDICINE

## 2022-02-01 PROCEDURE — 99999 PR PBB SHADOW E&M-EST. PATIENT-LVL III: ICD-10-PCS | Mod: PBBFAC,,, | Performed by: INTERNAL MEDICINE

## 2022-02-01 PROCEDURE — 99214 OFFICE O/P EST MOD 30 MIN: CPT | Mod: S$GLB,,, | Performed by: INTERNAL MEDICINE

## 2022-02-01 NOTE — PATIENT INSTRUCTIONS
VACCINATION IS KEY TO ENDING THIS PANDEMIC.    The [facility] is located in [address] of the [facility] at [address] [facility] [address]  Phone number: [phone] / [phone].  If you would like help booking a vaccine appointment, please call [phone] or make a vaccination appointment now by logging into [facility].  Walk-ins are welcome, no appointment is necessary.    If you have any questions please email [email or web address].     Scheduling and community event information is available at [email or web address].

## 2022-02-15 ENCOUNTER — TELEPHONE (OUTPATIENT)
Dept: FAMILY MEDICINE | Facility: CLINIC | Age: 42
End: 2022-02-15
Payer: COMMERCIAL

## 2022-02-15 NOTE — TELEPHONE ENCOUNTER
Rt patient call and explained Dr. Oakley wanted her to f/u in 1 month . Offered soonest opening for next month 3/14 , she preferred 1 pm . Also added to waitlist for sooner .

## 2022-02-15 NOTE — TELEPHONE ENCOUNTER
----- Message from Caroline Yi sent at 2/15/2022  7:48 AM CST -----  Regarding: schedule bp check  Type: Patient Call Back    Who called:Ana     What is the request in detail: the patient was last seen on 2/1 by Dr. Oakley and discussed coming back in to her get bp check. She is not sure if Dr. Oakley wants her to come in, in 2 weeks or 2 months. Patient would like the bp scheduled for her and a response of the date and time to be sent on the portal. Please respond on the earliest convenience.    Can the clinic reply by [employer]?yes     Would the patient rather a call back or a response via [facility]? Call back     Best call back number:[phone]

## 2022-02-17 ENCOUNTER — OFFICE VISIT (OUTPATIENT)
Dept: OBSTETRICS AND GYNECOLOGY | Facility: CLINIC | Age: 42
End: 2022-02-17
Payer: COMMERCIAL

## 2022-02-17 VITALS
DIASTOLIC BLOOD PRESSURE: 64 MMHG | SYSTOLIC BLOOD PRESSURE: 108 MMHG | WEIGHT: 213.75 LBS | BODY MASS INDEX: 37.27 KG/M2

## 2022-02-17 DIAGNOSIS — Z01.419 WELL WOMAN EXAM WITH ROUTINE GYNECOLOGICAL EXAM: Primary | ICD-10-CM

## 2022-02-17 DIAGNOSIS — Z11.3 SCREEN FOR STD (SEXUALLY TRANSMITTED DISEASE): ICD-10-CM

## 2022-02-17 DIAGNOSIS — Z12.4 CERVICAL CANCER SCREENING: ICD-10-CM

## 2022-02-17 PROCEDURE — 87591 N.GONORRHOEAE DNA AMP PROB: CPT | Performed by: OBSTETRICS & GYNECOLOGY

## 2022-02-17 PROCEDURE — 87624 HPV HI-RISK TYP POOLED RSLT: CPT | Performed by: OBSTETRICS & GYNECOLOGY

## 2022-02-17 PROCEDURE — 88175 CYTOPATH C/V AUTO FLUID REDO: CPT | Performed by: OBSTETRICS & GYNECOLOGY

## 2022-02-17 PROCEDURE — 99396 PR PREVENTIVE VISIT,EST,40-64: ICD-10-PCS | Mod: S$GLB,,, | Performed by: OBSTETRICS & GYNECOLOGY

## 2022-02-17 PROCEDURE — 87491 CHLMYD TRACH DNA AMP PROBE: CPT | Performed by: OBSTETRICS & GYNECOLOGY

## 2022-02-17 PROCEDURE — 99999 PR PBB SHADOW E&M-EST. PATIENT-LVL II: CPT | Mod: PBBFAC,,, | Performed by: OBSTETRICS & GYNECOLOGY

## 2022-02-17 PROCEDURE — 99999 PR PBB SHADOW E&M-EST. PATIENT-LVL II: ICD-10-PCS | Mod: PBBFAC,,, | Performed by: OBSTETRICS & GYNECOLOGY

## 2022-02-17 PROCEDURE — 99396 PREV VISIT EST AGE 40-64: CPT | Mod: S$GLB,,, | Performed by: OBSTETRICS & GYNECOLOGY

## 2022-02-17 NOTE — PROGRESS NOTES
[facility]  Ambulatory Clinic  Obstetrics & Gynecology    Visit Date:  2022    Chief Complaint:  Annual GYN exam    History of Present Illness:      Ana Wharton is a 42 y.o.  here for a gynecologic exam.  Pt has no major complaints today.    Menses are regular, not heavy, crampy.  Pt current method of family planning is natural family planning.    Last pap ~2019 benign.  Pt denies active sexually transmitted infections.  Last mammo ~2022 benign.  Pt performs monthly self breast examination, non-smoker, uses seat belts, and denies abuse.   Pt denies abnormal vaginal bleeding, vaginal discharge, dysmenorrhea, dyspareunia, pelvic pain, bloating, early satiety, unintentional weight loss, breast mass/skin changes, incontinence, GI or urinary complaints.    Otherwise, the pt is in her usual state of health.    Past History:  Gynecologic history as noted above.    Review of Systems:      GENERAL:  No fever, fatigue, excessive weight gain or loss  HEENT:  No headaches, hearing changes, visual disturbance  RESPIRATORY:  No cough, shortness of breath  CARDIOVASCULAR:  No chest pain, heart palpitations, leg swelling  BREAST:  No lump, pain, nipple discharge, skin changes  GASTROINTESTINAL:  No nausea, vomiting, constipation, diarrhea, abd pain, rectal bleeding   GENITOURINARY:  See HPI  ENDOCRINE:  No heat or cold intolerance  HEMATOLOGIC:  No easy bruisability or bleeding   LYMPHATICS:  No enlarged nodes  MUSCULOSKELETAL:  No acute joint pain or swelling  SKIN:  No rash, lesions, jaundice  NEUROLOGIC:  No dizziness, weakness, syncope  PSYCHIATRIC:  No significant mood changes, homicidal/suicidal ideations    Physical Exam:     /64   Wt 97 kg (213 lb 11.8 oz)   BMI 37.27 kg/m²   Pulse 70's, Resp rate 18     GENERAL:  No acute distress, well-nourished  HEENT:  Atraumatic, anicteric, moist mucus membranes. Neck supple w/o masses.  BREAST:  Symmetric, nontender, no obvious masses,  adenopathy, skin changes or nipple discharge.  LUNGS:  Clear normal respiratory effort  HEART:  Regular rate and rhythm  ABDOMEN:  Soft, non-tender, non-distended, no obvious organomegaly  EXT:  Symmetric w/o cramping, claudication, or edema. +2 distal pulses.  SKIN:  No rashes or bruising  PSYCH:  Mood and affect appropriate  NEURO:  Grossly intact bilaterally    GENITOURINARY:    VULVAR:  Female external genitalia w/o obvious lesions. Female hair distribution. Normal urethral meatus. No gross lymphadenopathy.    VAGINA:  Well estrogenized with good rugation. Normal lubrication. Good support. No obvious lesion. No discharge.  CERVIX:  No cervical motion tenderness, discharge, or obvious lesions.   UTERUS:  Small, non-tender, normal contour  ADNEXA:  No masses, non-tender    RECTAL:  Deferred. No obvious external lesions    Chaperone present for exam.    Assessment:     42 y.o.  with h/o laparoscopic right salpingectomy secondary to ectopic pregnancy:    1. Well woman gynecologic exam    Plan:    A gynecologic health assessment was performed with age appropriate counseling.  Cervical cancer screening - pap obtained.  STI screening - pt requested gonorrhea & chlamydia testing.    Screening mammogram scheduled.  Encourage healthy lifestyle modifications, monthly self breast exams, prenatal vitamins, Ca/Vit D, rec'd COVID vaccines.  F/u with PCP for health maintenance.  Return 1 year for gynecologic exam or sooner as needed.    All questions answered, pt voiced understanding.        Ramon Dai MD

## 2022-02-22 LAB
C TRACH DNA SPEC QL NAA+PROBE: NOT DETECTED
N GONORRHOEA DNA SPEC QL NAA+PROBE: NOT DETECTED

## 2022-02-23 ENCOUNTER — TELEPHONE (OUTPATIENT)
Dept: OBSTETRICS AND GYNECOLOGY | Facility: CLINIC | Age: 42
End: 2022-02-23
Payer: COMMERCIAL

## 2022-02-23 DIAGNOSIS — A59.01 TRICHOMONAS VAGINITIS: Primary | ICD-10-CM

## 2022-02-23 DIAGNOSIS — A59.01 TRICHOMONAS VAGINITIS: ICD-10-CM

## 2022-02-23 LAB
FINAL PATHOLOGIC DIAGNOSIS: NORMAL
HPV HR 12 DNA SPEC QL NAA+PROBE: NEGATIVE
HPV16 AG SPEC QL: NEGATIVE
HPV18 DNA SPEC QL NAA+PROBE: NEGATIVE
Lab: NORMAL

## 2022-02-23 RX ORDER — METRONIDAZOLE 500 MG/1
500 TABLET ORAL EVERY 12 HOURS
Qty: 28 TABLET | Refills: 0 | Status: SHIPPED | OUTPATIENT
Start: 2022-02-23 | End: 2022-02-23 | Stop reason: SDUPTHER

## 2022-02-23 RX ORDER — METRONIDAZOLE 500 MG/1
500 TABLET ORAL EVERY 12 HOURS
Qty: 28 TABLET | Refills: 0 | Status: SHIPPED | OUTPATIENT
Start: 2022-02-23 | End: 2022-03-17

## 2022-02-23 NOTE — TELEPHONE ENCOUNTER
----- Message from Ramon Dai MD sent at 2/23/2022  1:20 PM CST -----  Please notify pt her pap smear was POSITIVE for trichomonas and bacterial vaginosis.    A Rx for flagyl has been sent to her pharmacy on file:    [id number] - [place], [place] [address] [place] [address]  [place] [address]  Phone: [phone] Fax: [phone]    Advise pt to abstain from all sexually activities and inform her partner(s) to seek STI testing/treatment.    No alcohol while on the antibx.    Thanks

## 2022-02-23 NOTE — TELEPHONE ENCOUNTER
----- Message from Marilu Buitrago sent at 2/23/2022  2:31 PM CST -----  Type: Patient Call Back    Who called: self    What is the request in detail: patient would like to have her metroNIDAZOLE (FLAGYL) 500 MG tablet sent to [facility] #[id number] - [place] [facility] [address]   Phone:  [phone]  Fax:  [phone]    Can the clinic reply by [facility]? no    Would the patient rather a call back or a response via [facility]?  call    Best call back number: .[phone] (home)         Patient would also like to know how could she have trichomonas if she's not sexual active

## 2022-03-14 ENCOUNTER — PATIENT MESSAGE (OUTPATIENT)
Dept: FAMILY MEDICINE | Facility: CLINIC | Age: 42
End: 2022-03-14
Payer: COMMERCIAL

## 2022-03-15 NOTE — TELEPHONE ENCOUNTER
Spoke with patient this morning in regards to her concerns. Pt expressed frustration for her experience in the clinic on yesterday (3/14/22) in regards to her 1pm appointment with Dr. Oakley. Pt was advised by staff to reschedule for a later date due to being outside of the appointment window/jonas period of 20 minutes post appt. Patient was offered a virtual visit to accommodate her, this RN apologized for the inconvenience and experience on yesterday. Pt has verbally accepted an in person visit for 3/17 at 9am with Dr. Oakley and was referred to the portal for confirmation of this appointment. Pt also declines future interactions with Candi GUADARRAMA (Dr. Oakley's medical assistant) due to their previous interaction. Dr. Oakley will be notified of the patient's request.

## 2022-03-17 ENCOUNTER — OFFICE VISIT (OUTPATIENT)
Dept: FAMILY MEDICINE | Facility: CLINIC | Age: 42
End: 2022-03-17
Payer: COMMERCIAL

## 2022-03-17 VITALS
DIASTOLIC BLOOD PRESSURE: 85 MMHG | OXYGEN SATURATION: 99 % | BODY MASS INDEX: 36.52 KG/M2 | HEART RATE: 70 BPM | SYSTOLIC BLOOD PRESSURE: 142 MMHG | HEIGHT: 63 IN | WEIGHT: 206.13 LBS | TEMPERATURE: 99 F

## 2022-03-17 DIAGNOSIS — E05.90 HYPERTHYROIDISM: ICD-10-CM

## 2022-03-17 DIAGNOSIS — L68.9 EXCESSIVE HAIR GROWTH: ICD-10-CM

## 2022-03-17 DIAGNOSIS — I10 ESSENTIAL HYPERTENSION: Primary | ICD-10-CM

## 2022-03-17 DIAGNOSIS — D64.9 ANEMIA, UNSPECIFIED TYPE: ICD-10-CM

## 2022-03-17 PROCEDURE — 99999 PR PBB SHADOW E&M-EST. PATIENT-LVL IV: ICD-10-PCS | Mod: PBBFAC,,, | Performed by: INTERNAL MEDICINE

## 2022-03-17 PROCEDURE — 99999 PR PBB SHADOW E&M-EST. PATIENT-LVL IV: CPT | Mod: PBBFAC,,, | Performed by: INTERNAL MEDICINE

## 2022-03-17 PROCEDURE — 99214 OFFICE O/P EST MOD 30 MIN: CPT | Mod: S$GLB,,, | Performed by: INTERNAL MEDICINE

## 2022-03-17 PROCEDURE — 99214 PR OFFICE/OUTPT VISIT, EST, LEVL IV, 30-39 MIN: ICD-10-PCS | Mod: S$GLB,,, | Performed by: INTERNAL MEDICINE

## 2022-03-17 RX ORDER — AZITHROMYCIN 500 MG/1
500 TABLET, FILM COATED ORAL DAILY
COMMUNITY
Start: 2022-01-08 | End: 2022-03-17

## 2022-03-17 RX ORDER — OFLOXACIN 3 MG/ML
2 SOLUTION/ DROPS OPHTHALMIC 4 TIMES DAILY
COMMUNITY
Start: 2022-01-22 | End: 2022-07-27

## 2022-03-17 RX ORDER — TOBRAMYCIN 3 MG/ML
SOLUTION/ DROPS OPHTHALMIC
COMMUNITY
Start: 2022-01-22 | End: 2023-10-19

## 2022-03-17 RX ORDER — NAPROXEN 500 MG/1
500 TABLET ORAL 2 TIMES DAILY
COMMUNITY
Start: 2022-01-22 | End: 2022-03-17

## 2022-03-17 NOTE — PROGRESS NOTES
HISTORY OF PRESENT ILLNESS:  Ana Wharton is a 42 y.o. female who presents to the clinic today for Hypertension    Last seen by me 2/1/22    BP at home 120-128/69-90 when checked at work.  Last time she took BP med was fall of 2021.  She has lost weight using topiramate.  Weight is down to 206 from 234 lb.  Was seen in bariatrics clinic by Dr. Stock.  She is without symptoms or other complaints.    PAST MEDICAL HISTORY:  Past Medical History:   Diagnosis Date    Hypertension     Miscarriage 2018    Thyroid disease     hyperthyroidism        PAST SURGICAL HISTORY:  Past Surgical History:   Procedure Laterality Date    CHOLECYSTECTOMY  2014    ECTOPIC PREGNANCY SURGERY  2018    GALLBLADDER SURGERY         SOCIAL HISTORY:  Social History     Socioeconomic History    Marital status:    Occupational History    Occupation: [employer]   Tobacco Use    Smoking status: Never Smoker    Smokeless tobacco: Never Used   Substance and Sexual Activity    Alcohol use: No     Comment: socially    Drug use: No    Sexual activity: Yes     Partners: Male     Birth control/protection: None     Social Determinants of Health     Financial Resource Strain: Low Risk     Difficulty of Paying Living Expenses: Not very hard   Food Insecurity: Food Insecurity Present    Worried About Running Out of Food in the Last Year: Sometimes true    Ran Out of Food in the Last Year: Never true   Transportation Needs: No Transportation Needs    Lack of Transportation (Medical): No    Lack of Transportation (Non-Medical): No   Physical Activity: Insufficiently Active    Days of Exercise per Week: 2 days    Minutes of Exercise per Session: 10 min   Stress: No Stress Concern Present    Feeling of Stress : Only a little   Social Connections: Unknown    Frequency of Communication with Friends and Family: More than three times a week    Frequency of Social Gatherings with Friends and Family: Once a week    Active Member of  Clubs or Organizations: No    Attends Club or Organization Meetings: Never    Marital Status:    Housing Stability: High Risk    Unable to Pay for Housing in the Last Year: Yes    Number of Places Lived in the Last Year: 1    Unstable Housing in the Last Year: No       FAMILY HISTORY:  Family History   Problem Relation Age of Onset    Heart disease Mother 47    Diabetes Mother     Kidney disease Mother         on dialysis    Sickle cell anemia Mother     COPD Father     Heart failure Father     Breast cancer Sister 49    Hypertension Sister     Diabetes Brother     Lung cancer Maternal Grandmother         smoker    Sickle cell anemia Maternal Grandfather     Heart failure Paternal Grandfather     Diabetes Brother     Diabetes Brother     Hypertension Brother     Hashimoto's thyroiditis Sister     Scoliosis Sister        ALLERGIES AND MEDICATIONS: updated and reviewed.  Review of patient's allergies indicates:   Allergen Reactions    Dilaudid [hydromorphone (bulk)] Hives    Penicillins      hives     Medication List with Changes/Refills   Current Medications    METRONIDAZOLE (FLAGYL) 500 MG TABLET    Take 1 tablet (500 mg total) by mouth every 12 (twelve) hours. As needed for 7 days for bacterial vaginosis, trichomonas. May repeat a second course if no improvement.    PRENAT.VITS,KYLIE,MIN-IRON-FOLIC (PRENATAL VITAMIN) TAB    Take 1 tablet by mouth once daily.    TERBINAFINE HCL (LAMISIL) 250 MG TABLET    Take 1 tablet (250 mg total) by mouth once daily.    TOPIRAMATE (TOPAMAX) 25 MG TABLET    Take 1 tablet (25 mg total) by mouth 2 (two) times daily.    TOPIRAMATE (TOPAMAX) 25 MG TABLET    Take 1 tablet (25 mg total) by mouth 2 (two) times a day.          CARE TEAM:  Patient Care Team:  Chadwick Oakley MD as PCP - General (Internal Medicine)         REVIEW OF SYSTEMS:  Review of Systems   Constitutional: Negative for chills and fever.   HENT: Negative for congestion and postnasal drip.     Eyes: Negative for photophobia and visual disturbance.   Respiratory: Negative for cough and shortness of breath.    Cardiovascular: Negative for chest pain and palpitations.   Gastrointestinal: Negative for abdominal pain, nausea and vomiting.   Genitourinary: Negative for dysuria and frequency.   Musculoskeletal: Negative for arthralgias and back pain.   Neurological: Negative for light-headedness and headaches.   Psychiatric/Behavioral: Negative for dysphoric mood. The patient is not nervous/anxious.          PHYSICAL EXAM:   Vitals:    03/17/22 0914   BP: (!) 142/85   Pulse:    Temp:              Body mass index is 36.51 kg/m².     General appearance - alert, well appearing, and in no distress and oriented to person, place, and time  Mental status - normal mood, behavior, speech, dress, motor activity, and thought processes  Eyes - sclera anicteric, left eye normal, right eye normal  Chest - clear to auscultation, no wheezes, rales or rhonchi, symmetric air entry, no tachypnea, retractions or cyanosis  Heart - normal rate, regular rhythm, normal S1, S2, no murmurs, rubs, clicks or gallops  Neurological - alert, oriented, normal speech, no focal findings or movement disorder noted, motor and sensory grossly normal bilaterally  Extremities - peripheral pulses normal, no pedal edema, no clubbing or cyanosis      ASSESSMENT AND PLAN:  Essential hypertension  -     Comprehensive Metabolic Panel; Future; Expected date: 03/17/2022  - She was having normal BP reading off BP meds since weight loss but today is elevated.  Return for nurse visit.  If elevated for that, will consider addition of med if needed.    Excessive hair growth  -     Ambulatory referral/consult to Dermatology; Future; Expected date: 03/24/2022    Hyperthyroidism  -     Thyroid Peroxidase Antibody; Future; Expected date: 03/17/2022  -     TSH; Future; Expected date: 03/17/2022    Anemia, unspecified type  -     CBC Auto Differential; Future;  Expected date: 03/17/2022  -     Ferritin; Future; Expected date: 03/17/2022  -     Iron and TIBC; Future; Expected date: 03/17/2022             Follow up 3 months or sooner as needed.

## 2022-03-18 ENCOUNTER — PATIENT MESSAGE (OUTPATIENT)
Dept: FAMILY MEDICINE | Facility: CLINIC | Age: 42
End: 2022-03-18
Payer: COMMERCIAL

## 2022-03-29 ENCOUNTER — CLINICAL SUPPORT (OUTPATIENT)
Dept: FAMILY MEDICINE | Facility: CLINIC | Age: 42
End: 2022-03-29
Payer: COMMERCIAL

## 2022-03-29 VITALS — DIASTOLIC BLOOD PRESSURE: 90 MMHG | HEART RATE: 84 BPM | OXYGEN SATURATION: 98 % | SYSTOLIC BLOOD PRESSURE: 156 MMHG

## 2022-03-29 DIAGNOSIS — Z01.30 BLOOD PRESSURE CHECK: Primary | ICD-10-CM

## 2022-03-29 PROCEDURE — 99499 UNLISTED E&M SERVICE: CPT | Mod: S$GLB,,, | Performed by: INTERNAL MEDICINE

## 2022-03-29 PROCEDURE — 99499 NO LOS: ICD-10-PCS | Mod: S$GLB,,, | Performed by: INTERNAL MEDICINE

## 2022-03-29 PROCEDURE — 99999 PR PBB SHADOW E&M-EST. PATIENT-LVL III: ICD-10-PCS | Mod: PBBFAC,,,

## 2022-03-29 PROCEDURE — 99999 PR PBB SHADOW E&M-EST. PATIENT-LVL III: CPT | Mod: PBBFAC,,,

## 2022-03-29 NOTE — PROGRESS NOTES
Ana Wharton 42 y.o. female is here today for Blood Pressure check.   History of HTN yes.    Review of patient's allergies indicates:   Allergen Reactions    Dilaudid [hydromorphone (bulk)] Hives    Penicillins      hives     Creatinine   Date Value Ref Range Status   02/18/2021 0.8 0.5 - 1.4 mg/dL Final     Sodium   Date Value Ref Range Status   02/18/2021 138 136 - 145 mmol/L Final     Potassium   Date Value Ref Range Status   02/18/2021 4.1 3.5 - 5.1 mmol/L Final   ]  Patient denies taking blood pressure medications on a regular basis at the same time of the day.     Current Outpatient Medications:     ofloxacin (OCUFLOX) 0.3 % ophthalmic solution, Place 2 drops into both eyes 4 (four) times daily., Disp: , Rfl:     prenat.vits,lance,min-iron-folic (PRENATAL VITAMIN) Tab, Take 1 tablet by mouth once daily., Disp: 90 each, Rfl: 3    terbinafine HCL (LAMISIL) 250 mg tablet, Take 1 tablet (250 mg total) by mouth once daily., Disp: 30 tablet, Rfl: 3    tobramycin sulfate 0.3% (TOBREX) 0.3 % ophthalmic solution, Place into both eyes., Disp: , Rfl:     topiramate (TOPAMAX) 25 MG tablet, Take 1 tablet (25 mg total) by mouth 2 (two) times daily., Disp: 60 tablet, Rfl: 0    topiramate (TOPAMAX) 25 MG tablet, Take 1 tablet (25 mg total) by mouth 2 (two) times a day., Disp: 60 tablet, Rfl: 0  Does patient have record of home blood pressure readings no. Patient stated checking blood pressure reading at work.   Patient is asymptomatic.   Complains of none.    152/98 , Pulse 88 .    Blood pressure reading after 15 minutes was 156/90, Pulse 84.   will be  notified.

## 2022-04-01 ENCOUNTER — PATIENT MESSAGE (OUTPATIENT)
Dept: FAMILY MEDICINE | Facility: CLINIC | Age: 42
End: 2022-04-01
Payer: COMMERCIAL

## 2022-04-01 DIAGNOSIS — I10 ESSENTIAL HYPERTENSION: Primary | ICD-10-CM

## 2022-04-01 RX ORDER — CHLORTHALIDONE 25 MG/1
25 TABLET ORAL DAILY
Qty: 30 TABLET | Refills: 11 | Status: SHIPPED | OUTPATIENT
Start: 2022-04-01 | End: 2023-10-19

## 2022-04-06 LAB
ALBUMIN SERPL-MCNC: 3.8 G/DL (ref 3.6–5.1)
ALBUMIN/GLOB SERPL: 1.2 (CALC) (ref 1–2.5)
ALP SERPL-CCNC: 55 U/L (ref 31–125)
ALT SERPL-CCNC: 23 U/L (ref 6–29)
AST SERPL-CCNC: 22 U/L (ref 10–30)
BASOPHILS # BLD AUTO: 28 CELLS/UL (ref 0–200)
BASOPHILS NFR BLD AUTO: 0.3 %
BILIRUB SERPL-MCNC: 0.3 MG/DL (ref 0.2–1.2)
BUN SERPL-MCNC: 6 MG/DL (ref 7–25)
BUN/CREAT SERPL: 7 (CALC) (ref 6–22)
CALCIUM SERPL-MCNC: 8.9 MG/DL (ref 8.6–10.2)
CHLORIDE SERPL-SCNC: 105 MMOL/L (ref 98–110)
CO2 SERPL-SCNC: 27 MMOL/L (ref 20–32)
CREAT SERPL-MCNC: 0.83 MG/DL (ref 0.5–1.1)
EOSINOPHIL # BLD AUTO: 138 CELLS/UL (ref 15–500)
EOSINOPHIL NFR BLD AUTO: 1.5 %
ERYTHROCYTE [DISTWIDTH] IN BLOOD BY AUTOMATED COUNT: 15.3 % (ref 11–15)
FERRITIN SERPL-MCNC: 7 NG/ML (ref 16–232)
GLOBULIN SER CALC-MCNC: 3.1 G/DL (CALC) (ref 1.9–3.7)
GLUCOSE SERPL-MCNC: 100 MG/DL (ref 65–99)
HCT VFR BLD AUTO: 35.2 % (ref 35–45)
HGB BLD-MCNC: 11.2 G/DL (ref 11.7–15.5)
IRON SATN MFR SERPL: 49 % (CALC) (ref 16–45)
IRON SERPL-MCNC: 172 MCG/DL (ref 40–190)
LYMPHOCYTES # BLD AUTO: 2512 CELLS/UL (ref 850–3900)
LYMPHOCYTES NFR BLD AUTO: 27.3 %
MCH RBC QN AUTO: 28.6 PG (ref 27–33)
MCHC RBC AUTO-ENTMCNC: 31.8 G/DL (ref 32–36)
MCV RBC AUTO: 89.8 FL (ref 80–100)
MONOCYTES # BLD AUTO: 994 CELLS/UL (ref 200–950)
MONOCYTES NFR BLD AUTO: 10.8 %
NEUTROPHILS # BLD AUTO: 5529 CELLS/UL (ref 1500–7800)
NEUTROPHILS NFR BLD AUTO: 60.1 %
PLATELET # BLD AUTO: 326 THOUSAND/UL (ref 140–400)
PMV BLD REES-ECKER: 10.9 FL (ref 7.5–12.5)
POTASSIUM SERPL-SCNC: 4.1 MMOL/L (ref 3.5–5.3)
PROT SERPL-MCNC: 6.9 G/DL (ref 6.1–8.1)
RBC # BLD AUTO: 3.92 MILLION/UL (ref 3.8–5.1)
SODIUM SERPL-SCNC: 137 MMOL/L (ref 135–146)
THYROPEROXIDASE AB SERPL-ACNC: 75 IU/ML
TIBC SERPL-MCNC: 348 MCG/DL (CALC) (ref 250–450)
TSH SERPL-ACNC: 2.11 MIU/L
WBC # BLD AUTO: 9.2 THOUSAND/UL (ref 3.8–10.8)

## 2022-04-14 ENCOUNTER — HOSPITAL ENCOUNTER (OUTPATIENT)
Dept: RADIOLOGY | Facility: HOSPITAL | Age: 42
Discharge: HOME | End: 2022-04-14
Attending: INTERNAL MEDICINE
Payer: COMMERCIAL

## 2022-04-14 DIAGNOSIS — Z00.00 HEALTHCARE MAINTENANCE: ICD-10-CM

## 2022-04-14 DIAGNOSIS — Z12.39 ENCOUNTER FOR SCREENING FOR MALIGNANT NEOPLASM OF BREAST, UNSPECIFIED SCREENING MODALITY: ICD-10-CM

## 2022-04-14 PROCEDURE — 77067 SCR MAMMO BI INCL CAD: CPT | Mod: TC,PO

## 2022-04-14 PROCEDURE — 77063 MAMMO DIGITAL SCREENING BILAT WITH TOMO: ICD-10-PCS | Mod: 26,,, | Performed by: RADIOLOGY

## 2022-04-14 PROCEDURE — 77063 BREAST TOMOSYNTHESIS BI: CPT | Mod: TC,PO

## 2022-04-14 PROCEDURE — 77067 SCR MAMMO BI INCL CAD: CPT | Mod: 26,,, | Performed by: RADIOLOGY

## 2022-04-14 PROCEDURE — 77063 BREAST TOMOSYNTHESIS BI: CPT | Mod: 26,,, | Performed by: RADIOLOGY

## 2022-04-14 PROCEDURE — 77067 MAMMO DIGITAL SCREENING BILAT WITH TOMO: ICD-10-PCS | Mod: 26,,, | Performed by: RADIOLOGY

## 2022-05-03 ENCOUNTER — PATIENT MESSAGE (OUTPATIENT)
Dept: ENDOCRINOLOGY | Facility: CLINIC | Age: 42
End: 2022-05-03
Payer: COMMERCIAL

## 2022-05-04 ENCOUNTER — OFFICE VISIT (OUTPATIENT)
Dept: ENDOCRINOLOGY | Facility: CLINIC | Age: 42
End: 2022-05-04
Payer: COMMERCIAL

## 2022-05-04 VITALS
SYSTOLIC BLOOD PRESSURE: 139 MMHG | BODY MASS INDEX: 38.37 KG/M2 | TEMPERATURE: 99 F | DIASTOLIC BLOOD PRESSURE: 89 MMHG | HEART RATE: 82 BPM | WEIGHT: 216.63 LBS

## 2022-05-04 DIAGNOSIS — R53.83 FATIGUE, UNSPECIFIED TYPE: ICD-10-CM

## 2022-05-04 DIAGNOSIS — E06.3 HASHIMOTO'S THYROIDITIS: Primary | ICD-10-CM

## 2022-05-04 DIAGNOSIS — I10 ESSENTIAL HYPERTENSION: ICD-10-CM

## 2022-05-04 DIAGNOSIS — E66.01 CLASS 3 SEVERE OBESITY WITH BODY MASS INDEX (BMI) OF 40.0 TO 44.9 IN ADULT, UNSPECIFIED OBESITY TYPE, UNSPECIFIED WHETHER SERIOUS COMORBIDITY PRESENT: ICD-10-CM

## 2022-05-04 PROCEDURE — 99214 PR OFFICE/OUTPT VISIT, EST, LEVL IV, 30-39 MIN: ICD-10-PCS | Mod: S$GLB,,, | Performed by: HOSPITALIST

## 2022-05-04 PROCEDURE — 99999 PR PBB SHADOW E&M-EST. PATIENT-LVL III: ICD-10-PCS | Mod: PBBFAC,,, | Performed by: HOSPITALIST

## 2022-05-04 PROCEDURE — 99999 PR PBB SHADOW E&M-EST. PATIENT-LVL III: CPT | Mod: PBBFAC,,, | Performed by: HOSPITALIST

## 2022-05-04 PROCEDURE — 99214 OFFICE O/P EST MOD 30 MIN: CPT | Mod: S$GLB,,, | Performed by: HOSPITALIST

## 2022-05-04 RX ORDER — CLINDAMYCIN PHOSPHATE 10 MG/G
GEL TOPICAL
COMMUNITY
Start: 2022-04-05 | End: 2023-09-06 | Stop reason: SDUPTHER

## 2022-05-04 RX ORDER — SPIRONOLACTONE 100 MG/1
TABLET, FILM COATED ORAL
COMMUNITY
Start: 2022-04-05 | End: 2023-10-19

## 2022-05-04 NOTE — PROGRESS NOTES
Subjective:      Patient ID: Ana Wharton is a 42 y.o. female presented to Endocrinology clinic on 5/4/2022.    Chief Complaint:  Hyperthyroidism      History of Present Illness: Ana Wharton is a 42 y.o. female with Hashimoto thyroiditis  Significant past medical history:  Obesity, hypertension  Previously under the care of outside endocrinologist Dr. Rehman at [facility]:  Is here for follow-up, history of Chronic lymphocytic thyroiditis/hashimoto, poor sleep, fatigue. She want her thyroid to be screen  No lower neck enlargement.  Does have mild anemia.  Trying to get pregnant per patient  Following with gyn  Anemia noted      With regards to hyperthyroidism/Hashimoto   - Diagnosed in 2018  - Been on methimazole since that time, noted abnormal lab work in 2018 after failed pregnancy  - Sister with hashimoto and aunt has hyperthyroidism  - Last office visit: stop methimazole as she was unable to cut the dose in half.  For the last 2 months.  Denies any new onset palpitation.  TFTs were normal.    - TPO positive no lab work noted, pointing towards Hashimoto's, TSI negative    She is not pregnant, and she is not breastfeeding. Has plan for pregnancy, trying  Etiology determined to be:  Hashimoto's    Medications: known to affect thyroid function  Reviewed    Herbal/Vitamin/Supplement:  Currently taking Vit D, Lemon balm (once a week for thyroid)    Graves' Ophthalmopathy: denies  Does wear contract  No pain with ocular movement    Thyroid ultrasound: 5/10/21  Thyroid is upper limits of normal in size.  Right lobe of the thyroid measures 5.7 x 1.8 x 1.8 cm.  Left lobe of the thyroid measures 5.2 x 1.6 x 1.8 cm.  No distinct thyroid nodules.  Mildly increased perfusion of the thyroid parenchyma which may be seen with thyroiditis.  No abnormal lymph nodes.     Impression:  Increased perfusion of the thyroid parenchyma which may be seen with thyroiditis.     Lab Results   Component Value Date    TSH  2.11 04/05/2022    TSH 1.32 11/12/2021    TSH 1.785 05/10/2021    FREET4 0.86 05/10/2021    FREET4 0.88 02/18/2021    THYROIDSTIMI <0.10 05/10/2021    THYROPEROXID 97.2 (H) 05/10/2021     Reviewed past surgical, medical, family, social history and updated as appropriate.    Review of Systems    Objective:   /89 (BP Location: Right arm)   Pulse 82   Temp 98.6 °F (37 °C) (Oral)   Wt 98.2 kg (216 lb 9.6 oz)   BMI 38.37 kg/m²     Body mass index is 38.37 kg/m².  Vital signs reviewed    Physical Exam  Vitals and nursing note reviewed.   Constitutional:       General: She is not in acute distress.     Appearance: Normal appearance. She is well-developed.   Neck:      Thyroid: No thyromegaly.   Musculoskeletal:      Cervical back: Neck supple.   Skin:     Findings: No erythema.   Neurological:      Mental Status: She is alert and oriented to person, place, and time.       Lab Reviewed:   Lab Results   Component Value Date    HGBA1C 4.9 02/18/2021       Lab Results   Component Value Date    CHOL 113 (L) 02/18/2021    HDL 34 (L) 02/18/2021    LDLCALC 63.6 02/18/2021    TRIG 77 02/18/2021    CHOLHDL 30.1 02/18/2021       Lab Results   Component Value Date     04/05/2022    K 4.1 04/05/2022     04/05/2022    CO2 27 04/05/2022     (H) 04/05/2022    BUN 6 (L) 04/05/2022    CREATININE 0.83 04/05/2022    CALCIUM 8.9 04/05/2022    PROT 6.9 04/05/2022    ALBUMIN 3.8 04/05/2022    BILITOT 0.3 04/05/2022    ALKPHOS 63 02/18/2021    AST 22 04/05/2022    ALT 23 04/05/2022    ANIONGAP 6 (L) 02/18/2021    ESTGFRAFRICA 102 04/05/2022    EGFRNONAA 88 04/05/2022    TSH 2.11 04/05/2022        Lab Results   Component Value Date    YHAVRVUS70FV 22 (L) 02/18/2021    CALCIUM 8.9 04/05/2022    CALCIUM 8.7 02/18/2021    CALCIUM 9.2 02/23/2018    ALKPHOS 63 02/18/2021    ALKPHOS 65 09/03/2020    ALKPHOS 58 02/23/2018    TSH 2.11 04/05/2022       Assessment     1. Hashimoto's thyroiditis  TSH    T4, Free    Thyroid  Peroxidase Antibody    TSH    T4, Free    Thyroid Peroxidase Antibody    Hemoglobin A1C    Hemoglobin A1C    Comprehensive Metabolic Panel    Comprehensive Metabolic Panel   2. Essential hypertension  Hemoglobin A1C    Hemoglobin A1C    Comprehensive Metabolic Panel    Comprehensive Metabolic Panel   3. Class 3 severe obesity with body mass index (BMI) of 40.0 to 44.9 in adult, unspecified obesity type, unspecified whether serious comorbidity present  Hemoglobin A1C    Hemoglobin A1C   4. Fatigue, unspecified type  Comprehensive Metabolic Panel    CBC Auto Differential    Comprehensive Metabolic Panel    CBC Auto Differential        Plan     Hashimoto's thyroiditis  - Chronic issue, possibly due to Hashimoto's thyroiditis, given positive TPO antibody, negative TSI  - No sign of GO  - Previously on methimazole for which she has stop since 2021  - US thyroid reviewed: 2021: enlarged but no nodules  - Neck exam WNL  - Continue to have normal TFTs  - Check TSH, FT4, TPO soon  - Advised yearly screening of TFTs can be done by PCP    Class 3 severe obesity with body mass index (BMI) of 40.0 to 44.9 in adult  - Body mass index is 38.37 kg/m².  - weight lost noted  - check CBC, A1C      Essential hypertension  - would avoid spironolactone in the future given plan for conception      RTC yearly    Jesse Rehman M.D  Endocrinology  [facility]  5/4/2022      Disclaimer: This note has been generated using voice-recognition software. There may be typographical errors that have been missed during proof-reading.

## 2022-05-04 NOTE — ASSESSMENT & PLAN NOTE
- Chronic issue, possibly due to Hashimoto's thyroiditis, given positive TPO antibody, negative TSI  - No sign of GO  - Previously on methimazole for which she has stop since 2021  - US thyroid reviewed: 2021: enlarged but no nodules  - Neck exam WNL  - Continue to have normal TFTs  - Check TSH, FT4, TPO soon  - Advised yearly screening of TFTs can be done by PCP

## 2022-07-27 ENCOUNTER — OFFICE VISIT (OUTPATIENT)
Dept: FAMILY MEDICINE | Facility: CLINIC | Age: 42
End: 2022-07-27
Payer: COMMERCIAL

## 2022-07-27 VITALS
SYSTOLIC BLOOD PRESSURE: 130 MMHG | RESPIRATION RATE: 18 BRPM | BODY MASS INDEX: 39.37 KG/M2 | DIASTOLIC BLOOD PRESSURE: 76 MMHG | HEART RATE: 67 BPM | WEIGHT: 222.25 LBS | OXYGEN SATURATION: 98 %

## 2022-07-27 DIAGNOSIS — D64.9 ANEMIA, UNSPECIFIED TYPE: ICD-10-CM

## 2022-07-27 DIAGNOSIS — I10 ESSENTIAL HYPERTENSION: ICD-10-CM

## 2022-07-27 DIAGNOSIS — E06.3 HASHIMOTO'S THYROIDITIS: Primary | ICD-10-CM

## 2022-07-27 PROCEDURE — 99214 OFFICE O/P EST MOD 30 MIN: CPT | Mod: S$GLB,,, | Performed by: INTERNAL MEDICINE

## 2022-07-27 PROCEDURE — 99214 PR OFFICE/OUTPT VISIT, EST, LEVL IV, 30-39 MIN: ICD-10-PCS | Mod: S$GLB,,, | Performed by: INTERNAL MEDICINE

## 2022-07-27 PROCEDURE — 99999 PR PBB SHADOW E&M-EST. PATIENT-LVL III: ICD-10-PCS | Mod: PBBFAC,,, | Performed by: INTERNAL MEDICINE

## 2022-07-27 PROCEDURE — 99999 PR PBB SHADOW E&M-EST. PATIENT-LVL III: CPT | Mod: PBBFAC,,, | Performed by: INTERNAL MEDICINE

## 2022-07-27 NOTE — PROGRESS NOTES
HISTORY OF PRESENT ILLNESS:  Ana Wharton is a 42 y.o. female who presents to the clinic today for follow up.  Last seen by me 3/2022.    Hyperthyroidism, Hashimoto thyroiditis  Seen by Dr. Rehman 5/4/22.    Noted that patient may be planning for pregnancy.    She is otherwise without symptoms today.    PAST MEDICAL HISTORY:  Past Medical History:   Diagnosis Date    Hypertension     Miscarriage 2018    Thyroid disease     hyperthyroidism        PAST SURGICAL HISTORY:  Past Surgical History:   Procedure Laterality Date    CHOLECYSTECTOMY  2014    ECTOPIC PREGNANCY SURGERY  2018    GALLBLADDER SURGERY         SOCIAL HISTORY:  Social History     Socioeconomic History    Marital status:    Occupational History    Occupation: [employer]   Tobacco Use    Smoking status: Never Smoker    Smokeless tobacco: Never Used   Substance and Sexual Activity    Alcohol use: No     Comment: socially    Drug use: No    Sexual activity: Yes     Partners: Male     Birth control/protection: None     Social Determinants of Health     Financial Resource Strain: Low Risk     Difficulty of Paying Living Expenses: Not very hard   Food Insecurity: Food Insecurity Present    Worried About Running Out of Food in the Last Year: Sometimes true    Ran Out of Food in the Last Year: Never true   Transportation Needs: No Transportation Needs    Lack of Transportation (Medical): No    Lack of Transportation (Non-Medical): No   Physical Activity: Insufficiently Active    Days of Exercise per Week: 2 days    Minutes of Exercise per Session: 10 min   Stress: No Stress Concern Present    Feeling of Stress : Only a little   Social Connections: Unknown    Frequency of Communication with Friends and Family: More than three times a week    Frequency of Social Gatherings with Friends and Family: Once a week    Active Member of Clubs or Organizations: No    Attends Club or Organization Meetings: Never    Marital Status:     Housing Stability: High Risk    Unable to Pay for Housing in the Last Year: Yes    Number of Places Lived in the Last Year: 1    Unstable Housing in the Last Year: No       FAMILY HISTORY:  Family History   Problem Relation Age of Onset    Heart disease Mother 47    Diabetes Mother     Kidney disease Mother         on dialysis    Sickle cell anemia Mother     COPD Father     Heart failure Father     Breast cancer Sister 49    Hypertension Sister     Diabetes Brother     Lung cancer Maternal Grandmother         smoker    Sickle cell anemia Maternal Grandfather     Heart failure Paternal Grandfather     Diabetes Brother     Diabetes Brother     Hypertension Brother     Hashimoto's thyroiditis Sister     Scoliosis Sister        ALLERGIES AND MEDICATIONS: updated and reviewed.  Review of patient's allergies indicates:   Allergen Reactions    Dilaudid [hydromorphone (bulk)] Hives    Penicillins      hives     Medication List with Changes/Refills   Current Medications    CHLORTHALIDONE (HYGROTEN) 25 MG TAB    Take 1 tablet (25 mg total) by mouth once daily.    CLINDAMYCIN PHOSPHATE 1% (CLINDAGEL) 1 % GEL        OFLOXACIN (OCUFLOX) 0.3 % OPHTHALMIC SOLUTION    Place 2 drops into both eyes 4 (four) times daily.    PRENAT.VITS,KYLIE,MIN-IRON-FOLIC (PRENATAL VITAMIN) TAB    Take 1 tablet by mouth once daily.    SPIRONOLACTONE (ALDACTONE) 100 MG TABLET        TERBINAFINE HCL (LAMISIL) 250 MG TABLET    Take 1 tablet (250 mg total) by mouth once daily.    TOBRAMYCIN SULFATE 0.3% (TOBREX) 0.3 % OPHTHALMIC SOLUTION    Place into both eyes.    TOPIRAMATE (TOPAMAX) 25 MG TABLET    Take 1 tablet (25 mg total) by mouth 2 (two) times daily.    TOPIRAMATE (TOPAMAX) 25 MG TABLET    Take 1 tablet (25 mg total) by mouth 2 (two) times a day.          CARE TEAM:  Patient Care Team:  Chadwick Oakley MD as PCP - General (Internal Medicine)         REVIEW OF SYSTEMS:  Review of Systems   Constitutional: Negative for chills  and fever.   HENT: Negative for congestion and postnasal drip.    Eyes: Negative for photophobia and visual disturbance.   Respiratory: Negative for cough and shortness of breath.    Cardiovascular: Negative for chest pain and palpitations.   Gastrointestinal: Negative for nausea and vomiting.   Genitourinary: Negative for dysuria and frequency.   Musculoskeletal: Negative for arthralgias and back pain.   Neurological: Negative for light-headedness and headaches.   Psychiatric/Behavioral: Negative for dysphoric mood. The patient is not nervous/anxious.          PHYSICAL EXAM:   Vitals:    07/27/22 1310   BP: 130/76   Pulse: 67   Resp: 18             Body mass index is 39.37 kg/m².     General appearance - alert, well appearing, and in no distress and oriented to person, place, and time  Mental status - normal mood, behavior, speech, dress, motor activity, and thought processes  Eyes - sclera anicteric, left eye normal, right eye normal  Chest - no tachypnea, retractions or cyanosis  Neurological - alert, oriented, normal speech, no focal findings or movement disorder noted, motor and sensory grossly normal bilaterally  Musculoskeletal - no joint tenderness, deformity or swelling  Extremities - peripheral pulses normal, no pedal edema, no clubbing or cyanosis      ASSESSMENT AND PLAN:  Hashimoto's thyroiditis  -     TSH; Future; Expected date: 01/27/2023  -     T4, Free; Future; Expected date: 01/27/2023  -     Thyroid Peroxidase Antibody; Future; Expected date: 01/27/2023  -     Hemoglobin A1C; Future; Expected date: 01/27/2023    Essential hypertension  -     Comprehensive Metabolic Panel; Future; Expected date: 07/27/2022    Anemia, unspecified type  -     CBC Auto Differential; Future; Expected date: 07/27/2022       Advised to stop chorthalidone, spironolactone and topiramate if she is planning for pregnancy.  She voiced understanding.  She is to follow up for nurse BP check in 2 weeks.  She was taking  chlorthalidone for fluid in legs and spironolactone for reducing facial hair growth.      Follow up 6 months or sooner as needed.

## 2022-08-03 ENCOUNTER — PATIENT MESSAGE (OUTPATIENT)
Dept: BARIATRICS | Facility: CLINIC | Age: 42
End: 2022-08-03
Payer: COMMERCIAL

## 2022-08-10 ENCOUNTER — PATIENT MESSAGE (OUTPATIENT)
Dept: FAMILY MEDICINE | Facility: CLINIC | Age: 42
End: 2022-08-10
Payer: COMMERCIAL

## 2022-08-23 ENCOUNTER — PATIENT MESSAGE (OUTPATIENT)
Dept: FAMILY MEDICINE | Facility: CLINIC | Age: 42
End: 2022-08-23
Payer: COMMERCIAL

## 2022-10-06 ENCOUNTER — PATIENT MESSAGE (OUTPATIENT)
Dept: BARIATRICS | Facility: CLINIC | Age: 42
End: 2022-10-06
Payer: COMMERCIAL

## 2023-01-24 LAB
ALBUMIN SERPL-MCNC: 4 G/DL (ref 3.6–5.1)
ALBUMIN/GLOB SERPL: 1.3 (CALC) (ref 1–2.5)
ALP SERPL-CCNC: 50 U/L (ref 31–125)
ALT SERPL-CCNC: 17 U/L (ref 6–29)
AST SERPL-CCNC: 13 U/L (ref 10–30)
BASOPHILS # BLD AUTO: 33 CELLS/UL (ref 0–200)
BASOPHILS NFR BLD AUTO: 0.4 %
BILIRUB SERPL-MCNC: 0.5 MG/DL (ref 0.2–1.2)
BUN SERPL-MCNC: 7 MG/DL (ref 7–25)
BUN/CREAT SERPL: NORMAL (CALC) (ref 6–22)
CALCIUM SERPL-MCNC: 8.9 MG/DL (ref 8.6–10.2)
CHLORIDE SERPL-SCNC: 105 MMOL/L (ref 98–110)
CHOLEST SERPL-MCNC: 121 MG/DL
CHOLEST/HDLC SERPL: 3 (CALC)
CO2 SERPL-SCNC: 29 MMOL/L (ref 20–32)
CREAT SERPL-MCNC: 0.9 MG/DL (ref 0.5–0.99)
EGFR: 82 ML/MIN/1.73M2
EOSINOPHIL # BLD AUTO: 83 CELLS/UL (ref 15–500)
EOSINOPHIL NFR BLD AUTO: 1 %
ERYTHROCYTE [DISTWIDTH] IN BLOOD BY AUTOMATED COUNT: 14.1 % (ref 11–15)
FERRITIN SERPL-MCNC: 5 NG/ML (ref 16–232)
GLOBULIN SER CALC-MCNC: 3.2 G/DL (CALC) (ref 1.9–3.7)
GLUCOSE SERPL-MCNC: 87 MG/DL (ref 65–99)
HBA1C MFR BLD: 4.9 % OF TOTAL HGB
HBA1C MFR BLD: 5 % OF TOTAL HGB
HCT VFR BLD AUTO: 32.7 % (ref 35–45)
HDLC SERPL-MCNC: 41 MG/DL
HGB BLD-MCNC: 10.7 G/DL (ref 11.7–15.5)
IRON SATN MFR SERPL: 7 % (CALC) (ref 16–45)
IRON SERPL-MCNC: 24 MCG/DL (ref 40–190)
LDLC SERPL CALC-MCNC: 64 MG/DL (CALC)
LYMPHOCYTES # BLD AUTO: 2631 CELLS/UL (ref 850–3900)
LYMPHOCYTES NFR BLD AUTO: 31.7 %
MCH RBC QN AUTO: 29.1 PG (ref 27–33)
MCHC RBC AUTO-ENTMCNC: 32.7 G/DL (ref 32–36)
MCV RBC AUTO: 88.9 FL (ref 80–100)
MONOCYTES # BLD AUTO: 905 CELLS/UL (ref 200–950)
MONOCYTES NFR BLD AUTO: 10.9 %
NEUTROPHILS # BLD AUTO: 4648 CELLS/UL (ref 1500–7800)
NEUTROPHILS NFR BLD AUTO: 56 %
NONHDLC SERPL-MCNC: 80 MG/DL (CALC)
PLATELET # BLD AUTO: 326 THOUSAND/UL (ref 140–400)
PMV BLD REES-ECKER: 11.5 FL (ref 7.5–12.5)
POTASSIUM SERPL-SCNC: 3.8 MMOL/L (ref 3.5–5.3)
PROT SERPL-MCNC: 7.2 G/DL (ref 6.1–8.1)
RBC # BLD AUTO: 3.68 MILLION/UL (ref 3.8–5.1)
SODIUM SERPL-SCNC: 139 MMOL/L (ref 135–146)
T4 FREE SERPL-MCNC: 0.9 NG/DL (ref 0.8–1.8)
T4 FREE SERPL-MCNC: 1 NG/DL (ref 0.8–1.8)
THYROPEROXIDASE AB SERPL-ACNC: 121 IU/ML
THYROPEROXIDASE AB SERPL-ACNC: 139 IU/ML
TIBC SERPL-MCNC: 369 MCG/DL (CALC) (ref 250–450)
TRIGL SERPL-MCNC: 75 MG/DL
TSH SERPL-ACNC: 2.08 MIU/L
TSH SERPL-ACNC: 2.15 MIU/L
WBC # BLD AUTO: 8.3 THOUSAND/UL (ref 3.8–10.8)

## 2023-01-25 ENCOUNTER — PATIENT MESSAGE (OUTPATIENT)
Dept: FAMILY MEDICINE | Facility: CLINIC | Age: 43
End: 2023-01-25
Payer: COMMERCIAL

## 2023-02-02 ENCOUNTER — PATIENT MESSAGE (OUTPATIENT)
Dept: FAMILY MEDICINE | Facility: CLINIC | Age: 43
End: 2023-02-02
Payer: COMMERCIAL

## 2023-02-06 ENCOUNTER — TELEPHONE (OUTPATIENT)
Dept: FAMILY MEDICINE | Facility: CLINIC | Age: 43
End: 2023-02-06
Payer: COMMERCIAL

## 2023-02-06 DIAGNOSIS — D50.0 IRON DEFICIENCY ANEMIA DUE TO CHRONIC BLOOD LOSS: Primary | ICD-10-CM

## 2023-02-06 RX ORDER — FERROUS SULFATE 324(65)MG
324 TABLET, DELAYED RELEASE (ENTERIC COATED) ORAL DAILY
Qty: 90 TABLET | Refills: 0 | Status: SHIPPED | OUTPATIENT
Start: 2023-02-06 | End: 2023-05-09

## 2023-02-06 NOTE — TELEPHONE ENCOUNTER
----- Message from Chadwick Oakley MD sent at 2/6/2023 12:06 PM CST -----  Please cancel patient's virtual that was scheduled this morning as she had technical issues.    Please call to let her know she is to get labs done non fasting in 3 months at [facility].  Schedule virtual with me in 3 months also.  To take daily ferrous sulfate 325 mg.

## 2023-02-08 ENCOUNTER — PATIENT MESSAGE (OUTPATIENT)
Dept: FAMILY MEDICINE | Facility: CLINIC | Age: 43
End: 2023-02-08
Payer: COMMERCIAL

## 2023-02-08 ENCOUNTER — PATIENT MESSAGE (OUTPATIENT)
Dept: ENDOCRINOLOGY | Facility: CLINIC | Age: 43
End: 2023-02-08
Payer: COMMERCIAL

## 2023-02-09 DIAGNOSIS — E66.09 OBESITY DUE TO EXCESS CALORIES, UNSPECIFIED CLASSIFICATION, UNSPECIFIED WHETHER SERIOUS COMORBIDITY PRESENT: Primary | ICD-10-CM

## 2023-02-22 ENCOUNTER — PATIENT MESSAGE (OUTPATIENT)
Dept: BARIATRICS | Facility: CLINIC | Age: 43
End: 2023-02-22
Payer: COMMERCIAL

## 2023-02-24 ENCOUNTER — OFFICE VISIT (OUTPATIENT)
Dept: OBSTETRICS AND GYNECOLOGY | Facility: CLINIC | Age: 43
End: 2023-02-24
Payer: COMMERCIAL

## 2023-02-24 ENCOUNTER — PATIENT MESSAGE (OUTPATIENT)
Dept: OBSTETRICS AND GYNECOLOGY | Facility: CLINIC | Age: 43
End: 2023-02-24

## 2023-02-24 ENCOUNTER — PATIENT OUTREACH (OUTPATIENT)
Dept: ADMINISTRATIVE | Facility: HOSPITAL | Age: 43
End: 2023-02-24
Payer: COMMERCIAL

## 2023-02-24 VITALS
BODY MASS INDEX: 40.36 KG/M2 | HEIGHT: 63 IN | SYSTOLIC BLOOD PRESSURE: 128 MMHG | WEIGHT: 227.75 LBS | DIASTOLIC BLOOD PRESSURE: 84 MMHG

## 2023-02-24 DIAGNOSIS — Z11.3 SCREENING FOR STDS (SEXUALLY TRANSMITTED DISEASES): Primary | ICD-10-CM

## 2023-02-24 DIAGNOSIS — Z12.31 BREAST CANCER SCREENING BY MAMMOGRAM: ICD-10-CM

## 2023-02-24 PROCEDURE — 99396 PR PREVENTIVE VISIT,EST,40-64: ICD-10-PCS | Mod: S$GLB,,, | Performed by: OBSTETRICS & GYNECOLOGY

## 2023-02-24 PROCEDURE — 99999 PR PBB SHADOW E&M-EST. PATIENT-LVL III: CPT | Mod: PBBFAC,,, | Performed by: OBSTETRICS & GYNECOLOGY

## 2023-02-24 PROCEDURE — 99396 PREV VISIT EST AGE 40-64: CPT | Mod: S$GLB,,, | Performed by: OBSTETRICS & GYNECOLOGY

## 2023-02-24 PROCEDURE — 99999 PR PBB SHADOW E&M-EST. PATIENT-LVL III: ICD-10-PCS | Mod: PBBFAC,,, | Performed by: OBSTETRICS & GYNECOLOGY

## 2023-02-24 PROCEDURE — 87591 N.GONORRHOEAE DNA AMP PROB: CPT | Performed by: OBSTETRICS & GYNECOLOGY

## 2023-02-24 NOTE — PROGRESS NOTES
"[facility]  Ambulatory Clinic  Obstetrics & Gynecology    Visit Date:  2023    Chief Complaint:  Annual GYN exam    History of Present Illness:      Ana Wharton is a 43 y.o.  here for a gynecologic exam. Menses are regular, not heavy or painful.  Pt current method of family planning is natural family planning, and reports no problems with this method.  Last pap ~2022 was benign.  Last mammo ~2022 was benign.  Pt is a non-smoker.  Pt denies abnormal vaginal bleeding, vaginal discharge, dysmenorrhea, dyspareunia, pelvic pain, bloating, early satiety, unintentional weight loss, breast mass/skin changes, incontinence, GI or urinary complaints.  Otherwise, the pt is in her usual state of health.    Past History:  Gynecologic history as noted above.    Review of Systems:      GENERAL:  No fever, fatigue, excessive weight gain or loss  HEENT:  No headaches, hearing changes, visual disturbance  RESPIRATORY:  No cough, shortness of breath  CARDIOVASCULAR:  No chest pain, heart palpitations, leg swelling  BREAST:  No lump, pain, nipple discharge, skin changes  GASTROINTESTINAL:  No nausea, vomiting, constipation, diarrhea, abd pain, rectal bleeding   GENITOURINARY:  See HPI  ENDOCRINE:  No heat or cold intolerance  HEMATOLOGIC:  No easy bruisability or bleeding   LYMPHATICS:  No enlarged nodes  MUSCULOSKELETAL:  No acute joint pain or swelling  SKIN:  No rash, lesions, jaundice  NEUROLOGIC:  No dizziness, weakness, syncope  PSYCHIATRIC:  No significant mood changes, homicidal/suicidal ideations, abuse    Physical Exam:     /84   Ht 5' 3" (1.6 m)   Wt 103.3 kg (227 lb 11.8 oz)   LMP 2023   BMI 40.34 kg/m²   Pulse 50's, Resp rate 14     GENERAL:  No acute distress, well-nourished  HEENT:  Atraumatic, anicteric, moist mucus membranes. Neck supple w/o masses.  BREAST:  Deferred today.  LUNGS:  Clear normal respiratory effort  HEART:  Regular rate and rhythm  ABDOMEN:  " Soft, non-tender, non-distended, normoactive bowel sounds, no obvious organomegaly  EXT:  Symmetric w/o cramping, claudication, or edema. +2 distal pulses.  SKIN:  No rashes or bruising  PSYCH:  Mood and affect appropriate  NEURO:  Grossly intact bilaterally    GENITOURINARY:    VULVAR:  Female external genitalia w/o obvious lesions. Female hair distribution. Normal urethral meatus. No gross lymphadenopathy.    VAGINA:  Normal vaginal mucosa. Good support. No obvious lesion. No discharge.  CERVIX:  No cervical motion tenderness, discharge, or obvious lesions.   UTERUS:  Small, non-tender, normal contour  ADNEXA:  No masses, non-tender    RECTAL:  Deferred. No obvious external lesions    Chaperone present for exam.    Assessment:     43 y.o. :    Well woman gynecologic exam    Plan:    A gynecologic health assessment was performed with age appropriate counseling.  Cervical cancer screening - pap up to date.  STI screening - pt requested gonorrhea & chlamydia testing.    Screening mammogram ordered, pt advised to call to schedule.  Encourage healthy lifestyle modifications, monthly self breast exams, f/u with PCP for health maintenance.  Return 1 year for gynecologic exam, or sooner as needed.  All questions answered, pt voiced understanding.        Ramon Dai MD

## 2023-02-25 LAB
C TRACH DNA SPEC QL NAA+PROBE: NOT DETECTED
N GONORRHOEA DNA SPEC QL NAA+PROBE: NOT DETECTED

## 2023-03-09 ENCOUNTER — PATIENT MESSAGE (OUTPATIENT)
Dept: OBSTETRICS AND GYNECOLOGY | Facility: CLINIC | Age: 43
End: 2023-03-09
Payer: COMMERCIAL

## 2023-03-09 DIAGNOSIS — Z31.41 FERTILITY TESTING: Primary | ICD-10-CM

## 2023-03-17 ENCOUNTER — TELEPHONE (OUTPATIENT)
Dept: OBSTETRICS AND GYNECOLOGY | Facility: CLINIC | Age: 43
End: 2023-03-17
Payer: COMMERCIAL

## 2023-03-17 NOTE — TELEPHONE ENCOUNTER
SPOKE WITH PT AND A COPY OF HER LAB THAT WAS ORDERED BY PROVIDER, PRINTED OUT AND LEFTED AT  .          ----- Message from Charity Talley sent at 3/17/2023  2:23 PM CDT -----  Type: Patient Call Back    Who called: self     What is the request in detail: pt is asking for her paper work to be faxed over     Can the clinic reply by [facility]?    Would the patient rather a call back or a response via [facility]?     Best call back number: [phone] (home)       Additional Information [phone] Fax #

## 2023-03-27 ENCOUNTER — PATIENT MESSAGE (OUTPATIENT)
Dept: OBSTETRICS AND GYNECOLOGY | Facility: CLINIC | Age: 43
End: 2023-03-27
Payer: COMMERCIAL

## 2023-03-28 ENCOUNTER — PATIENT MESSAGE (OUTPATIENT)
Dept: OBSTETRICS AND GYNECOLOGY | Facility: CLINIC | Age: 43
End: 2023-03-28
Payer: COMMERCIAL

## 2023-03-30 ENCOUNTER — TELEPHONE (OUTPATIENT)
Dept: FAMILY MEDICINE | Facility: CLINIC | Age: 43
End: 2023-03-30
Payer: COMMERCIAL

## 2023-03-30 NOTE — TELEPHONE ENCOUNTER
----- Message from Bernice Herrera sent at 3/30/2023  3:39 PM CDT -----  .Type: Patient Call Back    Who called:Laura tavarez/ [facility]    What is the request in detail: Requesting clinical notes from 2/6/2023 and 2/9/2023    Can the clinic reply by [facility]? No     Would the patient rather a call back or a response via [facility]? Call back    Best call back number:[phone] ext [phone]    Additional Information:

## 2023-04-05 ENCOUNTER — PATIENT MESSAGE (OUTPATIENT)
Dept: FAMILY MEDICINE | Facility: CLINIC | Age: 43
End: 2023-04-05
Payer: COMMERCIAL

## 2023-04-14 ENCOUNTER — OFFICE VISIT (OUTPATIENT)
Dept: ENDOCRINOLOGY | Facility: CLINIC | Age: 43
End: 2023-04-14
Payer: COMMERCIAL

## 2023-04-14 VITALS
SYSTOLIC BLOOD PRESSURE: 149 MMHG | TEMPERATURE: 98 F | DIASTOLIC BLOOD PRESSURE: 102 MMHG | WEIGHT: 229.19 LBS | BODY MASS INDEX: 40.6 KG/M2 | HEART RATE: 76 BPM

## 2023-04-14 DIAGNOSIS — E06.3 HASHIMOTO'S THYROIDITIS: Primary | ICD-10-CM

## 2023-04-14 DIAGNOSIS — E66.01 CLASS 3 SEVERE OBESITY WITH BODY MASS INDEX (BMI) OF 40.0 TO 44.9 IN ADULT, UNSPECIFIED OBESITY TYPE, UNSPECIFIED WHETHER SERIOUS COMORBIDITY PRESENT: ICD-10-CM

## 2023-04-14 DIAGNOSIS — E05.90 HYPERTHYROIDISM: ICD-10-CM

## 2023-04-14 PROCEDURE — 99999 PR PBB SHADOW E&M-EST. PATIENT-LVL IV: ICD-10-PCS | Mod: PBBFAC,,, | Performed by: HOSPITALIST

## 2023-04-14 PROCEDURE — 99999 PR PBB SHADOW E&M-EST. PATIENT-LVL IV: CPT | Mod: PBBFAC,,, | Performed by: HOSPITALIST

## 2023-04-14 PROCEDURE — 99214 OFFICE O/P EST MOD 30 MIN: CPT | Mod: S$GLB,,, | Performed by: HOSPITALIST

## 2023-04-14 PROCEDURE — 99214 PR OFFICE/OUTPT VISIT, EST, LEVL IV, 30-39 MIN: ICD-10-PCS | Mod: S$GLB,,, | Performed by: HOSPITALIST

## 2023-04-14 NOTE — PROGRESS NOTES
Subjective:      Patient ID: Ana Wharton is a 43 y.o. female presented to Endocrinology clinic on 4/14/2023.    Chief Complaint:  Hashimoto's Thyroiditis    History of Present Illness: Ana Wharton is a 43 y.o. female with Hashimoto thyroiditis  Significant past medical history:  Obesity, hypertension  Previously under the care of outside endocrinologist Dr. Rehman at [facility] hx:  Patient is here for follow-up of Hashimoto's thyroiditis.  Recent lab work show positive elevated TPO antibody by PCP.  Patient was concerned and here for discussion   Patient denies lower neck enlargement.  Does have history of goiter.    Most recent TSH at goal, 0.2.  Normal free T4   Patient reports interested in liposuction to help with weight.      With regards to Hashimoto's thyroiditis  - patient did have hyperthyroidism Diagnosed in 2018  - Been on methimazole since that time, noted abnormal lab work in 2018 after failed pregnancy>> at that time patient was started on methimazole  - Sister with hashimoto and aunt has hyperthyroidism  - TFTs were normal on lab work here as far back as 2021.  TPO mildly positive, TSI negative  - Methimazole was stopped 06/2021.  Denies any new onset palpitation.    - She is not pregnant, and she is not breastfeeding. Has plan for pregnancy, trying  - Medications: known to affect thyroid function, not  - Herbal/Vitamin/Supplement:  Currently taking Vit D, Lemon balm (once a week for thyroid)    Graves' Ophthalmopathy: denies  Does wear contract  No pain with ocular movement    Thyroid ultrasound: 5/10/21  Thyroid is upper limits of normal in size.  Right lobe of the thyroid measures 5.7 x 1.8 x 1.8 cm.  Left lobe of the thyroid measures 5.2 x 1.6 x 1.8 cm.  No distinct thyroid nodules.  Mildly increased perfusion of the thyroid parenchyma which may be seen with thyroiditis.  No abnormal lymph nodes.     Impression:  Increased perfusion of the thyroid parenchyma which may be seen  with thyroiditis.    Thyroid lab work  Lab Results   Component Value Date    TSH 2.15 01/23/2023    TSH 2.08 01/23/2023    TSH 2.11 04/05/2022    TSH 1.32 11/12/2021    FREET4 0.86 05/10/2021    FREET4 0.88 02/18/2021      Antibodies  Lab Results   Component Value Date    THYROIDAB 139 (H) 01/23/2023    THYROIDAB 121 (H) 01/23/2023    THYROIDAB 75 (H) 04/05/2022    THYROIDAB 60 (H) 11/12/2021    THYROIDSTIMI <0.10 05/10/2021         Reviewed past surgical, medical, family, social history and updated as appropriate.  Review of Systems    Objective:   BP (!) 149/102 (BP Location: Left arm)   Pulse 76   Temp 98.1 °F (36.7 °C) (Oral)   Wt 104 kg (229 lb 3.2 oz)   BMI 40.60 kg/m²     Body mass index is 40.6 kg/m².  Vital signs reviewed    Physical Exam  Vitals and nursing note reviewed.   Constitutional:       General: She is not in acute distress.     Appearance: Normal appearance. She is well-developed.   Neck:      Thyroid: No thyromegaly.   Musculoskeletal:      Cervical back: Neck supple.   Skin:     Findings: No erythema.   Neurological:      Mental Status: She is alert and oriented to person, place, and time.     Lab Reviewed:   Lab Results   Component Value Date    HGBA1C 4.9 01/23/2023     Lab Results   Component Value Date    CHOL 121 01/23/2023    HDL 41 (L) 01/23/2023    LDLCALC 64 01/23/2023    TRIG 75 01/23/2023    CHOLHDL 3.0 01/23/2023     Lab Results   Component Value Date     01/23/2023    K 3.8 01/23/2023     01/23/2023    CO2 29 01/23/2023    GLU 87 01/23/2023    BUN 7 01/23/2023    CREATININE 0.90 01/23/2023    CALCIUM 8.9 01/23/2023    PROT 7.2 01/23/2023    ALBUMIN 4.0 01/23/2023    BILITOT 0.5 01/23/2023    ALKPHOS 63 02/18/2021    AST 13 01/23/2023    ALT 17 01/23/2023    ANIONGAP 6 (L) 02/18/2021    ESTGFRAFRICA 102 04/05/2022    EGFRNONAA 88 04/05/2022    TSH 2.15 01/23/2023    [id number] 22 (L) 02/18/2021     Assessment     1. Hashimoto's thyroiditis  US Soft Tissue Head Neck  Thyroid    TSH    T4, Free    Thyroid Peroxidase Antibody    TSH    T4, Free    Thyroid Peroxidase Antibody    CANCELED: TSH    CANCELED: T4, Free    CANCELED: Thyroid Peroxidase Antibody      2. Class 3 severe obesity with body mass index (BMI) of 40.0 to 44.9 in adult, unspecified obesity type, unspecified whether serious comorbidity present        3. Hyperthyroidism          Plan     Hashimoto's thyroiditis  - Chronic issue, possibly due to Hashimoto's thyroiditis, given positive TPO antibody, negative TSI  - No sign of GO  - Previously on methimazole for which she has stop since 2021  - US thyroid reviewed: 2021: enlarged but no nodules, will repeat thyroid ultrasound for 2020  - Continue to have normal TFTs  - Check TSH, FT4, TPO in 4-5 months  - Advised yearly screening of TFTs can be done by PCP  - reassurance given, TPO elevated is a chronic inflammation.  Would only treat if TSH increased to 3.5    Class 3 severe obesity with body mass index (BMI) of 40.0 to 44.9 in adult  - Body mass index is 40.6 kg/m².  - patient reports interested in liposuction    Hyperthyroidism  - stable, no longer needing methimazole  - this was stop 2021    Return as needed    Jesse Rehman M.D  Endocrinology  [facility]  4/14/2023      Disclaimer: This note has been generated using voice-recognition software. There may be typographical errors that have been missed during proof-reading.

## 2023-04-14 NOTE — PATIENT INSTRUCTIONS
The positive thyroid peroxidase (TPO antibody) is a condition called Hashimoto's thyroiditis, or better known as autoimmune thyroid inflammation.     In this condition, the immune system attacks the thyroid gland, causing inflammation and damage over time. As a result, the thyroid gland may not produce enough thyroid hormones, leading to hypothyroidism.    It is the most common cause of underactive thyroid in the [place]. This level will always be elevated, for many years.  Level ranging from mild elevation of 20 to 5000 in more active inflammation state.    If your thyroid function is normal, there is a slight chance in the future the positive antibodies can make your thyroid function worse requiring medication at a later time, it is why we recommend repeating lab work in 6 months to re-evaluate.      Unfortunately there are no way definitive way to lowering the TPO antibody, it is state of inflammation. There are some literature that showed a trial of selenium supplements on 100 mcg daily might help lower does level. It can be found  like [employer].

## 2023-04-14 NOTE — ASSESSMENT & PLAN NOTE
- Chronic issue, possibly due to Hashimoto's thyroiditis, given positive TPO antibody, negative TSI  - No sign of GO  - Previously on methimazole for which she has stop since 2021  - US thyroid reviewed: 2021: enlarged but no nodules, will repeat thyroid ultrasound for 2020  - Continue to have normal TFTs  - Check TSH, FT4, TPO in 4-5 months  - Advised yearly screening of TFTs can be done by PCP  - reassurance given, TPO elevated is a chronic inflammation.  Would only treat if TSH increased to 3.5

## 2023-04-18 ENCOUNTER — HOSPITAL ENCOUNTER (OUTPATIENT)
Dept: RADIOLOGY | Facility: HOSPITAL | Age: 43
Discharge: HOME | End: 2023-04-18
Attending: OBSTETRICS & GYNECOLOGY
Payer: COMMERCIAL

## 2023-04-18 DIAGNOSIS — Z12.31 BREAST CANCER SCREENING BY MAMMOGRAM: ICD-10-CM

## 2023-04-18 PROCEDURE — 77063 BREAST TOMOSYNTHESIS BI: CPT | Mod: 26,,, | Performed by: RADIOLOGY

## 2023-04-18 PROCEDURE — 77067 SCR MAMMO BI INCL CAD: CPT | Mod: 26,,, | Performed by: RADIOLOGY

## 2023-04-18 PROCEDURE — 77067 MAMMO DIGITAL SCREENING BILAT WITH TOMO: ICD-10-PCS | Mod: 26,,, | Performed by: RADIOLOGY

## 2023-04-18 PROCEDURE — 77063 MAMMO DIGITAL SCREENING BILAT WITH TOMO: ICD-10-PCS | Mod: 26,,, | Performed by: RADIOLOGY

## 2023-04-18 PROCEDURE — 77067 SCR MAMMO BI INCL CAD: CPT | Mod: TC

## 2023-04-24 ENCOUNTER — OFFICE VISIT (OUTPATIENT)
Dept: FAMILY MEDICINE | Facility: CLINIC | Age: 43
End: 2023-04-24
Payer: COMMERCIAL

## 2023-04-24 DIAGNOSIS — E66.01 CLASS 3 SEVERE OBESITY WITH BODY MASS INDEX (BMI) OF 40.0 TO 44.9 IN ADULT, UNSPECIFIED OBESITY TYPE, UNSPECIFIED WHETHER SERIOUS COMORBIDITY PRESENT: Primary | ICD-10-CM

## 2023-04-24 DIAGNOSIS — Z12.39 ENCOUNTER FOR SCREENING FOR MALIGNANT NEOPLASM OF BREAST, UNSPECIFIED SCREENING MODALITY: ICD-10-CM

## 2023-04-24 PROCEDURE — 99213 OFFICE O/P EST LOW 20 MIN: CPT | Mod: 95,,, | Performed by: INTERNAL MEDICINE

## 2023-04-24 PROCEDURE — 99213 PR OFFICE/OUTPT VISIT, EST, LEVL III, 20-29 MIN: ICD-10-PCS | Mod: 95,,, | Performed by: INTERNAL MEDICINE

## 2023-04-24 RX ORDER — TOPIRAMATE 25 MG/1
25 TABLET ORAL 2 TIMES DAILY
Qty: 60 TABLET | Refills: 2 | Status: SHIPPED | OUTPATIENT
Start: 2023-04-24

## 2023-04-24 NOTE — PROGRESS NOTES
HISTORY OF PRESENT ILLNESS:  Ana Wharton is a 43 y.o. female who presents to the clinic today for weight management.    The patient location is: [place]  The chief complaint leading to consultation is: weight management    Visit type: audiovisual    Face to Face time with patient: 10 min  15 minutes of total time spent on the encounter, which includes face to face time and non-face to face time preparing to see the patient (eg, review of tests), Obtaining and/or reviewing separately obtained history, Documenting clinical information in the electronic or other health record, Independently interpreting results (not separately reported) and communicating results to the patient/family/caregiver, or Care coordination (not separately reported).         Each patient to whom he or she provides medical services by telemedicine is:  (1) informed of the relationship between the physician and patient and the respective role of any other health care provider with respect to management of the patient; and (2) notified that he or she may decline to receive medical services by telemedicine and may withdraw from such care at any time.    Notes:   Last seen by me 7/2022.  On intermittent fasting and exercising.  Joined gym in the last two days.  Weight at home 212 lb.  Last reported clinic weight 229 lb 4/14/23.  Goal weight is 160 lb.  Seen in bariatrics 6/2021 and had some success with topiramate.    PAST MEDICAL HISTORY:  Past Medical History:   Diagnosis Date    Hypertension     Miscarriage 2018    Thyroid disease     hyperthyroidism        PAST SURGICAL HISTORY:  Past Surgical History:   Procedure Laterality Date    CHOLECYSTECTOMY  2014    ECTOPIC PREGNANCY SURGERY  2018    GALLBLADDER SURGERY         SOCIAL HISTORY:  Social History     Socioeconomic History    Marital status:    Occupational History    Occupation: [employer]   Tobacco Use    Smoking status: Never    Smokeless tobacco: Never   Substance and Sexual  Activity    Alcohol use: No     Comment: socially    Drug use: No    Sexual activity: Yes     Partners: Male     Birth control/protection: None     Social Determinants of Health     Financial Resource Strain: Low Risk     Difficulty of Paying Living Expenses: Not hard at all   Food Insecurity: No Food Insecurity    Worried About Running Out of Food in the Last Year: Never true    Ran Out of Food in the Last Year: Never true   Transportation Needs: No Transportation Needs    Lack of Transportation (Medical): No    Lack of Transportation (Non-Medical): No   Physical Activity: Sufficiently Active    Days of Exercise per Week: 5 days    Minutes of Exercise per Session: 60 min   Stress: No Stress Concern Present    Feeling of Stress : Not at all   Social Connections: Unknown    Frequency of Communication with Friends and Family: More than three times a week    Frequency of Social Gatherings with Friends and Family: More than three times a week    Active Member of Clubs or Organizations: No    Attends Club or Organization Meetings: Patient refused    Marital Status:    Housing Stability: Low Risk     Unable to Pay for Housing in the Last Year: No    Number of Places Lived in the Last Year: 2    Unstable Housing in the Last Year: No       FAMILY HISTORY:  Family History   Problem Relation Age of Onset    Heart disease Mother 47    Diabetes Mother     Kidney disease Mother         on dialysis    Sickle cell anemia Mother     COPD Father     Heart failure Father     Breast cancer Sister 49    Hypertension Sister     Hashimoto's thyroiditis Sister     Scoliosis Sister     Lung cancer Maternal Grandmother         smoker    Sickle cell anemia Maternal Grandfather     Heart failure Paternal Grandfather     Diabetes Brother     Diabetes Brother     Diabetes Brother     Hypertension Brother        ALLERGIES AND MEDICATIONS: updated and reviewed.  Review of patient's allergies indicates:   Allergen Reactions    Dilaudid  [hydromorphone (bulk)] Hives    Penicillins      hives     Medication List with Changes/Refills   Current Medications    CHLORTHALIDONE (HYGROTEN) 25 MG TAB    Take 1 tablet (25 mg total) by mouth once daily.    CLINDAMYCIN PHOSPHATE 1% (CLINDAGEL) 1 % GEL        FERROUS SULFATE 324 MG (65 MG IRON) TBEC    Take 1 tablet (324 mg total) by mouth once daily.    PRENAT.VITS,KYLIE,MIN-IRON-FOLIC (PRENATAL VITAMIN) TAB    Take 1 tablet by mouth once daily.    SPIRONOLACTONE (ALDACTONE) 100 MG TABLET        TOBRAMYCIN SULFATE 0.3% (TOBREX) 0.3 % OPHTHALMIC SOLUTION    Place into both eyes.    TOPIRAMATE (TOPAMAX) 25 MG TABLET    Take 1 tablet (25 mg total) by mouth 2 (two) times a day.          CARE TEAM:  Patient Care Team:  Chadwick Oakley MD as PCP - General (Internal Medicine)         REVIEW OF SYSTEMS:  Review of Systems   Constitutional:  Negative for activity change and unexpected weight change.   HENT:  Negative for hearing loss, rhinorrhea and trouble swallowing.    Eyes:  Negative for discharge and visual disturbance.   Respiratory:  Negative for chest tightness and wheezing.    Cardiovascular:  Negative for chest pain and palpitations.   Gastrointestinal:  Negative for blood in stool, constipation, diarrhea and vomiting.   Endocrine: Negative for polydipsia and polyuria.   Genitourinary:  Negative for difficulty urinating, dysuria, hematuria and menstrual problem.   Musculoskeletal:  Negative for neck pain.   Neurological:  Negative for weakness and headaches.   Psychiatric/Behavioral:  Negative for confusion and dysphoric mood.        PHYSICAL EXAM:    General appearance - alert, well appearing, and in no distress    ASSESSMENT AND PLAN:  Class 3 severe obesity with body mass index (BMI) of 40.0 to 44.9 in adult, unspecified obesity type, unspecified whether serious comorbidity present  -     topiramate (TOPAMAX) 25 MG tablet; Take 1 tablet (25 mg total) by mouth 2 (two) times a day.  Dispense: 60 tablet; Refill:  2  - Advised for strict contraception measures while taking topiramate for pregnancy prevention.  Patient voiced understanding.    Encounter for screening for malignant neoplasm of breast, unspecified screening modality        - Await results of mammo.            Follow up in 2 weeks or sooner as needed.

## 2023-04-25 ENCOUNTER — HOSPITAL ENCOUNTER (OUTPATIENT)
Dept: RADIOLOGY | Facility: HOSPITAL | Age: 43
Discharge: HOME | End: 2023-04-25
Attending: HOSPITALIST
Payer: COMMERCIAL

## 2023-04-25 DIAGNOSIS — E06.3 HASHIMOTO'S THYROIDITIS: ICD-10-CM

## 2023-04-25 PROCEDURE — 76536 US SOFT TISSUE HEAD NECK THYROID: ICD-10-PCS | Mod: 26,,, | Performed by: RADIOLOGY

## 2023-04-25 PROCEDURE — 76536 US EXAM OF HEAD AND NECK: CPT | Mod: TC

## 2023-04-25 PROCEDURE — 76536 US EXAM OF HEAD AND NECK: CPT | Mod: 26,,, | Performed by: RADIOLOGY

## 2023-04-26 ENCOUNTER — TELEPHONE (OUTPATIENT)
Dept: OBSTETRICS AND GYNECOLOGY | Facility: CLINIC | Age: 43
End: 2023-04-26
Payer: COMMERCIAL

## 2023-04-26 DIAGNOSIS — Z91.89 AT RISK FOR BREAST CANCER: Primary | ICD-10-CM

## 2023-04-26 NOTE — TELEPHONE ENCOUNTER
Pt was called and she stated she spoke with dr. dai        ----- Message from Ramon Dai MD sent at 4/26/2023  1:29 PM CDT -----  Pt notified for elevated Tyrer-Cuzick > 20%  Discussed elevated breast cancer risk.    Refer to breast surgery for breast cancer risk assessment.    Pt was advised to call and schedule.  Timely follow up advised.  All questions answered, voiced understanding.

## 2023-05-07 LAB
BASOPHILS # BLD AUTO: 30 CELLS/UL (ref 0–200)
BASOPHILS NFR BLD AUTO: 0.3 %
EOSINOPHIL # BLD AUTO: 120 CELLS/UL (ref 15–500)
EOSINOPHIL NFR BLD AUTO: 1.2 %
ERYTHROCYTE [DISTWIDTH] IN BLOOD BY AUTOMATED COUNT: 13.3 % (ref 11–15)
FERRITIN SERPL-MCNC: 15 NG/ML (ref 16–232)
FOLATE SERPL-MCNC: 19.1 NG/ML
HCT VFR BLD AUTO: 38 % (ref 35–45)
HGB BLD-MCNC: 12.5 G/DL (ref 11.7–15.5)
IRON SATN MFR SERPL: 38 % (CALC) (ref 16–45)
IRON SERPL-MCNC: 124 MCG/DL (ref 40–190)
LYMPHOCYTES # BLD AUTO: 2990 CELLS/UL (ref 850–3900)
LYMPHOCYTES NFR BLD AUTO: 29.9 %
MCH RBC QN AUTO: 30.6 PG (ref 27–33)
MCHC RBC AUTO-ENTMCNC: 32.9 G/DL (ref 32–36)
MCV RBC AUTO: 93.1 FL (ref 80–100)
MONOCYTES # BLD AUTO: 1100 CELLS/UL (ref 200–950)
MONOCYTES NFR BLD AUTO: 11 %
NEUTROPHILS # BLD AUTO: 5760 CELLS/UL (ref 1500–7800)
NEUTROPHILS NFR BLD AUTO: 57.6 %
PLATELET # BLD AUTO: 309 THOUSAND/UL (ref 140–400)
PMV BLD REES-ECKER: 10.6 FL (ref 7.5–12.5)
RBC # BLD AUTO: 4.08 MILLION/UL (ref 3.8–5.1)
TIBC SERPL-MCNC: 329 MCG/DL (CALC) (ref 250–450)
VIT B12 SERPL-MCNC: 403 PG/ML (ref 200–1100)
WBC # BLD AUTO: 10 THOUSAND/UL (ref 3.8–10.8)

## 2023-05-15 ENCOUNTER — OFFICE VISIT (OUTPATIENT)
Dept: HEMATOLOGY/ONCOLOGY | Facility: CLINIC | Age: 43
End: 2023-05-15
Payer: COMMERCIAL

## 2023-05-15 DIAGNOSIS — Z91.89 AT HIGH RISK FOR BREAST CANCER: ICD-10-CM

## 2023-05-15 DIAGNOSIS — Z80.3 FAMILY HISTORY OF BREAST CANCER: ICD-10-CM

## 2023-05-15 DIAGNOSIS — R92.30 DENSE BREAST TISSUE: Primary | ICD-10-CM

## 2023-05-15 PROCEDURE — 99205 OFFICE O/P NEW HI 60 MIN: CPT | Mod: 95,,, | Performed by: NURSE PRACTITIONER

## 2023-05-15 PROCEDURE — 99205 PR OFFICE/OUTPT VISIT, NEW, LEVL V, 60-74 MIN: ICD-10-PCS | Mod: 95,,, | Performed by: NURSE PRACTITIONER

## 2023-05-15 NOTE — PROGRESS NOTES
The patient location is: [place]  The chief complaint leading to consultation is: high risk breast cancer    Visit type: audiovisual    Patient logged in 10 minutes late for her appt and was actively working during audiovisual. Patient understands all information will be placed in AVS and she can call with questions.     60 minutes of total time spent on the encounter, which includes face to face time and non-face to face time preparing to see the patient (eg, review of tests), Obtaining and/or reviewing separately obtained history, Documenting clinical information in the electronic or other health record, Independently interpreting results (not separately reported) and communicating results to the patient/family/caregiver, or Care coordination (not separately reported).         Each patient to whom he or she provides medical services by telemedicine is:  (1) informed of the relationship between the physician and patient and the respective role of any other health care provider with respect to management of the patient; and (2) notified that he or she may decline to receive medical services by telemedicine and may withdraw from such care at any time.    Notes:       Reason For Consultation:   High-Risk Breast Cancer      Referring Provider:   Ramon Dai MD  [address]  [address]  [place] [address]    Records Obtained: Records of the patients history including those obtained from the referring provider were reviewed and summarized in detail.    HPI:   Ana Wharton is a 43 y.o. who presents for consultation of increased risk of breast cancer.      Today, Feels good and no complaints.   No breast concerns.    4/18/2023 MMG:   Impression:   No mammographic evidence of malignancy.     BI-RADS Category 1: Negative     Recommendation:  Routine screening mammogram in 1 year is recommended.     Your estimated lifetime risk of breast cancer (to age 85) based on Tyrer-Cuzick risk assessment model is 20.55 %.  According  to the American Cancer Society, patients with a lifetime breast cancer risk of 20% or higher might benefit from supplemental screening tests. ??        High Risk Breast cancer specific history:  - Age: 43 y.o.   - Height:  5'3  - Weight:   Wt Readings from Last 3 Encounters:   23 1332 104 kg (229 lb 3.2 oz)   23 1322 103.3 kg (227 lb 11.8 oz)   22 1310 100.8 kg (222 lb 3.6 oz)      - Breast density per BI-RADS:  b - Scattered fibroglandular density  - Age at menarche:  11 yo  - Number of pregnancies: ;   -Uterus and ovaries intact: Yes  - She is premenopausal. Age at menopause, if applicable:  n/a.   - HRT: No  - Genetic testing: No  - Personal history of cancer: No  - Previous chest radiation exposure between ages 10-30 years old: No  - Personal history of breast biopsy: No  - Ashkenazi Sikhism Inheritance: No  - Family history of cancer:    Sister- breast cancer dx 48 yo currently 50. No genetic testing  Maternal grandmother- lung cancer    Social History:  Tobacco use:  no  Alcohol use:  no  Employment: works at [employer]    SEE CALCULATED RISK BELOW.     Past Medical   Past Medical History:   Diagnosis Date    Hypertension     Miscarriage 2018    Thyroid disease     hyperthyroidism      Patient Active Problem List   Diagnosis    Status post right salpingectomy    Hyperthyroidism    Class 3 severe obesity with body mass index (BMI) of 40.0 to 44.9 in adult    Essential hypertension    Anxiety    Diagnosis deferred    Hashimoto's thyroiditis     Social History   Social History     Tobacco Use    Smoking status: Never    Smokeless tobacco: Never   Substance Use Topics    Alcohol use: No     Comment: socially    Drug use: No     Family History  Family History   Problem Relation Age of Onset    Heart disease Mother 47    Diabetes Mother     Kidney disease Mother         on dialysis    Sickle cell anemia Mother     COPD Father     Heart failure Father     Breast cancer Sister 49    Hypertension Sister      Hashimoto's thyroiditis Sister     Scoliosis Sister     Lung cancer Maternal Grandmother         smoker    Sickle cell anemia Maternal Grandfather     Heart failure Paternal Grandfather     Diabetes Brother     Diabetes Brother     Diabetes Brother     Hypertension Brother      Medications    Current Outpatient Medications:     chlorthalidone (HYGROTEN) 25 MG Tab, Take 1 tablet (25 mg total) by mouth once daily. (Patient not taking: Reported on 2/24/2023), Disp: 30 tablet, Rfl: 11    clindamycin phosphate 1% (CLINDAGEL) 1 % gel, , Disp: , Rfl:     prenat.vits,lance,min-iron-folic (PRENATAL VITAMIN) Tab, Take 1 tablet by mouth once daily., Disp: 90 each, Rfl: 3    spironolactone (ALDACTONE) 100 MG tablet, , Disp: , Rfl:     tobramycin sulfate 0.3% (TOBREX) 0.3 % ophthalmic solution, Place into both eyes., Disp: , Rfl:     topiramate (TOPAMAX) 25 MG tablet, Take 1 tablet (25 mg total) by mouth 2 (two) times a day., Disp: 60 tablet, Rfl: 2  Allergies  Review of patient's allergies indicates:   Allergen Reactions    Dilaudid [hydromorphone (bulk)] Hives    Penicillins      hives       Review of Systems       See above   All other systems reviewed and are negative.    Objective:          Physical Exam  Limited PE as virtual.   Appears comfortable however off and on screen as working.     Laboratory Data: reviewed most recent   Imaging: reviewed most recent      General Education discussed:     Educational videos:   What Is a TC Score?    https://youtu.be/[email or web address]     What If I Have a High-Risk TC Score?     https://youtu.be/[email or web address]        Discussed with patient that there are several models available for stratifying breast cancer risk, and Samueler-Larack is presently the model utilized by [facility] and is a model recommended per current NCCN guidelines.    The Kerri Model for Breast Cancer risk estimates the absolute 5 year risk and lifetime risk of developing breast cancer. Family history includes  only first degree relatives with breast cancer, which is not enough information to estimate the risk of a patient having BRCA mutation. It also underestimates the cancer risk for patients with extensive family history. The Kerri Model is a good predictor of risk for populations but not for individuals. It adjusts risk for race/ethnicity. It may underestimate breast cancer risk in patients with atypical hyperplasia and strong family history. The Kerri Model was NOT designed to estimate risk for: Women with a prior diagnosis of breast cancer, lobular carcinoma in situ (LCIS), or ductal carcinoma in situ (DCIS);  Women who have received previous radiation therapy to the chest for treatment of Hodgkin lymphoma;  Women with gene mutations in BRCA1 or BRCA2, or those who are known to have certain genetic syndromes that increase risk for breast cancer; Women of age <35 or >85.    We discussed that there are limitations to every model for risk assessment, particularly that TC can overestimate risk in women with atypical hyperplasia and dense breasts and that Kerri underestimates risk for those with a strong family history of breast or ovarian cancers as well as non-white women with atypical hyperplasia which can make them appear to not be candidates for risk reducing therapies.             Recommendation for women with elevated TC score of > 20%:         Recommendation for women with elevated Kerri Model.       General education:     Risk factors associated with breast cancer are categorized into 2 groups: Modifiable and Non-modifiable. Modifiable risk factors include use of hormones, alcohol, smoking, diet and exercise. Non-modifiable risk factors include breast density, genetics, chest radiation, previous pregnancies, age of first period, and age of menopause.     Factors associated with greater breast cancer risk: (this list is not patient specific)  -Increasing age The risk of breast cancer increases with older  age.  -Female sex  -White race (In the [place], the highest breast cancer risk occurs among White women, although breast cancer remains the most common cancer among women of every major ethnic/racial group )  -Weight and body fat in postmenopausal women -Obesity (defined as body mass index [BMI] ?30 kg/m2) is associated with an overall increase  in morbidity and mortality. However, the risk of breast cancer associated with BMI differs by menopausal status.   ?Postmenopausal women - A higher BMI and/or perimenopausal weight gain have been consistently associated with a higher risk of breast cancer among postmenopausal women. The association between a higher BMI and postmenopausal breast cancer risk may be mediated by higher estrogen levels resulting from the peripheral conversion of estrogen precursors (from adipose tissue) to estrogen    ?Inverse relationship in premenopausal women - Unlike postmenopausal women, an increased BMI is associated with a lower risk of breast cancer in premenopausal women, particularly in early adulthood.  The explanation of this finding remains unclear.  -Tall stature -women who were >175 cm (69 inches) tall were 20 percent more likely to develop breast cancer than those <160 cm (63 inches) tall.  -Benign breast disease  -Dense breast tissue -- The density of breast tissue reflects the relative amount of glandular and connective tissue (parenchyma) to adipose tissue. Women with mammographically dense breast tissue, generally defined as dense tissue comprising ?75 percent of the breast, have a four to five times higher breast cancer risk compared with women of similar age with less or no dense tissue. Although breast density is a largely inherited trait, other factors can influence density. For example, lower density has been associated with higher levels of physical activity  and with a low-fat, high-carbohydrate diet. In postmenopausal women, estrogen and progesterone increase  breast density  while the ER antagonist tamoxifen decreases breast density.  Despite the association of exogenous hormones with breast density, breast density is not strongly correlated with endogenous hormone levels. Breast tend to become more fatty with age.   -Bone mineral density -In multiple studies, women with higher bone density have a higher breast cancer risk  -Hormonal factors: HRT. Of note, IVF does not appear to increase the long-term risk of breast cancer, even in women with BRCA 1 and 2 mutations.     In the Women's Health Initiative (WHI), the risk of invasive breast cancer was significantly increased with combined hormone therapy (HT) at an average follow-up of 5.6 years.     A 2019 meta-analysis of all available epidemiologic evidence on the association between menopausal hormone therapy (MHT) use and breast cancer risk has been published. The analysis included nearly 145,000 women with breast cancer (51 percent of whom had used MHT) and nearly 425,000 without breast cancer. Their findings included:  ?Similar to the WHI, estrogen-progestin regimens were associated with excess breast cancer risk. An excess risk was also seen with estrogen-only regimens (a reduction in risk was seen in the WHI). There was no excess risk with vaginal estrogens.   ?Breast cancer risk increased with the duration of systemic MHT use. Unlike previous studies, obesity was not associated with excess risk; instead, it attenuated risk.  ?The authors of the study calculated that for women of average weight, five years of MHT use starting at age 50 years would increase their 20-year risk of breast cancer (between the ages of 50 and 69 years) by approximately:  One in every 50 users of estrogen plus daily progestin  One in every 70 users of estrogen plus intermittent progestin   One in every 200 users of estrogen-only regimens   Of note: There were important limitations in this study.   While this meta-analysis has renewed  concerns for some about the association between MHT and breast cancer, we continue to suggest an individualized approach when counseling symptomatic postmenopausal women about treatment. This includes putting the potential risk of breast cancer (and cardiovascular disease) in the context of the benefits of MHT (eg, relief of vasomotor symptoms, improved sleep and quality of life, and prevention of bone loss)  -Reproductive factors -Earlier menarche (before age 12) or later menopause (after age 52), Nulliparity, Increasing age at first full-term pregnancy.   (Of note, It is estimated that for every 12 months of breastfeeding, there was a 4.3 percent reduction in the relative risk (RR) of breast cancer)  -Personal and family history of breast cancer  -Alcohol use and smoking  -Exposure to therapeutic ionizing radiation       RECOMMENDED LIFESTYLE MODIFICATIONS FOR HIGH RISK PATIENTS:    * Reviewed Lifestyle modifications which have shown benefit:  Limit alcohol consumption to less than 1 drink per day (1 ounce liquor, 6 oz wine, 8 oz beer)  Avoid smoking.  Exercise at least 150 minutes per week of moderate intensity aerobic activity or at least 75 minutes of vigorous activity. Exercise can lower the relative risk of breast cancer by ~18-20%.  Maintain healthy weight and avoid post-menopausal weight gain. Avoid processed foods and eat more lean proteins, fruits and vegetables.                               * Available resources include genetic counseling, nutrition, weight management.       CHEMOPREVENTION: Patient does not qualify             * For women at high risk for breast cancer, endocrine therapy can reduce the risk of invasive and/or in situ breast cancers. (tamoxifen for premenopausal or postmenopausal women and raloxifene or exemestane for postmenopausal women).   -Tamoxifen 20 mg daily for 5 years has shown to reduce risk of breast cancer by 49% and women with ADH/ALH or LCIS have an even more significant  reduction of risk of 86%. Aromatase inhibitors for 5 years have also shown risk reduction in terms of 50-60%. At current, there is not adequate data to recommend longer courses of therapy more than 5 years for risk reduction.    -Above have been shown to lower the risk of breast cancer incidence, however there is no survival benefit in patients who don't have breast cancer.   -Tamoxifen has limited data in BRCA 1/2 mutation carriers but limited retrospective data is suggestive of benefit. There is retrospective data that aromatase inhibitors can reduce the risk of contralateral ER positive breast cancers in BRCA 1/2 patients who were taking AIs as adjuvant therapy. There is no data for raloxifen in the population.    -Risks of Tamoxifen side effects include hot flashes, invasive endometrial cancer in women > 49 years of age (2.3/1000 compared to 0.9/1000), cataracts, increased risk of pulmonary embolism among others.      RECOMMENDED SCREENING FOR HIGH RISK PATIENTS:                * Reviewed future screening:   Semiannual (every 6 months) CBE (clinical breast exam).   Annual Breast MRI alternating with an annual MMG.     The use of MRI for breast cancer detection is based on the concept of  tumor angiogenesis or neovascularity. Tumor-associated blood vessels have increased permeability, which leads to prompt uptake and release of gadolinium within the first one to two minutes after administration, leading to a pattern of rapid enhancement and washout on MRI.   Bilateral breast examination - Both breasts should be evaluated in an MRI study, for comparison purposes, even when concern about possible pathology involves only one breast.  Contrast - Intravenous gadolinium contrast must be used to maximize cancer detection and is administered before breast MRI to highlight the neovascularity associated with cancers. Contrast is not necessary when the study is performed to evaluate silicone implant integrity.  Allergic and  anaphylactoid reactions to gadolinium are rare, but can occur. In addition, in patients with renal failure, gadolinium can cause contrast nephropathy and/or nephrogenic systemic fibrosis.   A few studies have also reported gadolinium deposition in the brain from repeated intravenous administration, with the degree of deposition varying based on the specific contrast agent. The clinical significance of this deposition remains unknown, and no data for humans exist to show any adverse effects or harm at this time.   https://www.fda.gov/drugs/drug-safety-and-availability/fda-drug-safety-communication-fda-identifies-no-harmful-effects-date-brain-retention-gadolinium  https://www.fda.gov/Drugs/DrugSafety/hno851973.htm    -Contact insurance company with regards to coverage of MRI breasts.   -Cannot undergo an MRI if pregnant.   -MRI's may have false positives                For age over 75 years, screening recommendations are considered on an individual basis.           Assessment:     1. Dense breast tissue    2. At high risk for breast cancer    3. Family history of breast cancer        1. Increased risk of breast cancer   * Tyrer-Cuzick (TC) lifetime risk of 21.1%. Ten year 4.0 %. We discussed that TC score will categorize your lifetime risk of being diagnosed with breast cancer. Categories are as such: Average risk <15%, Intermediate risk 15-19%, and High risk > or = to 20%.    *The Kerri Model for Breast Cancer risk: She has a 1.1% 5-year breast cancer risk (Compared with 0.7% for the average 42 y.o. woman) and 14.8% Lifetime breast cancer risk (Compared with 9.8% for the average 42 y.o. woman). A woman's risk is considered low if her five-year risk of developing breast cancer is less than 1.6%; it is considered high if she scores above 1.66%. (All women who are over 60 have a score of at least 1.66 and are considered high risk, based on the Kerri Model.)        Plan:       Patient does not qualify for chemoprevention  at this time.   Patient elects to proceed with alternating annual mammogram and annual breast MRI along with semiannual CBEs. She will alternate CBE here and with GYN/PCP.  Encouraged breast awareness, including monthly breast self-exams.   Recommend lifestyle modifications as above.       RTC in 10/2023 to see me either at [facility] or on 3rd floor with a MRI breast a couple of days prior.       Route Chart for Scheduling    Med Onc Chart Routing      Follow up with physician    Follow up with SAMMIE . RTC in 10/2023 to see me either at [facility] or on 3rd floor with a MRI breast a couple of days prior.   Infusion scheduling note    Injection scheduling note    Labs    Imaging    Pharmacy appointment    Other referrals              Questions were encouraged and answered to patient's satisfaction, and patient verbalized understanding of information and agreement with the plan. Advised patient to RTC with any interval changes or concerns.      Patient is in agreement with the proposed treatment plan. All questions were answered to the patient's satisfaction. Pt knows to call clinic for any new or worsening symptoms and if anything is needed before the next clinic visit.      SHARRI Dean-C  Hematology & Oncology  [facility] [place] [address]  ph. [phone]  Fax. [phone]

## 2023-05-30 ENCOUNTER — OFFICE VISIT (OUTPATIENT)
Dept: FAMILY MEDICINE | Facility: CLINIC | Age: 43
End: 2023-05-30
Payer: COMMERCIAL

## 2023-05-30 DIAGNOSIS — E66.01 CLASS 3 SEVERE OBESITY DUE TO EXCESS CALORIES WITH BODY MASS INDEX (BMI) OF 40.0 TO 44.9 IN ADULT, UNSPECIFIED WHETHER SERIOUS COMORBIDITY PRESENT: Primary | ICD-10-CM

## 2023-05-30 DIAGNOSIS — D64.9 ANEMIA, UNSPECIFIED TYPE: ICD-10-CM

## 2023-05-30 PROCEDURE — 99213 OFFICE O/P EST LOW 20 MIN: CPT | Mod: 95,,, | Performed by: INTERNAL MEDICINE

## 2023-05-30 PROCEDURE — 99213 PR OFFICE/OUTPT VISIT, EST, LEVL III, 20-29 MIN: ICD-10-PCS | Mod: 95,,, | Performed by: INTERNAL MEDICINE

## 2023-05-30 NOTE — PROGRESS NOTES
HISTORY OF PRESENT ILLNESS:  Ana Wharton is a 43 y.o. female who presents to the clinic today for weight management.    The patient location is: [place]  The chief complaint leading to consultation is: weight management    Visit type: audiovisual    Face to Face time with patient: 5 min  10 minutes of total time spent on the encounter, which includes face to face time and non-face to face time preparing to see the patient (eg, review of tests), Obtaining and/or reviewing separately obtained history, Documenting clinical information in the electronic or other health record, Independently interpreting results (not separately reported) and communicating results to the patient/family/caregiver, or Care coordination (not separately reported).         Each patient to whom he or she provides medical services by telemedicine is:  (1) informed of the relationship between the physician and patient and the respective role of any other health care provider with respect to management of the patient; and (2) notified that he or she may decline to receive medical services by telemedicine and may withdraw from such care at any time.    Notes:     Last seen by me 4/2023 by virtual.    Increased risk of breast cancer  Seen by breast clinic with plan for q 6 months alternating MRI and mammo.    Obesity  On topiramate.  Missed last in person visit.  Weight is down 5 lbs on self report - down to 222 lb at home.  Exercise is intermittent.  Following pescatarian diet.    PAST MEDICAL HISTORY:  Past Medical History:   Diagnosis Date    Hypertension     Miscarriage 2018    Thyroid disease     hyperthyroidism        PAST SURGICAL HISTORY:  Past Surgical History:   Procedure Laterality Date    CHOLECYSTECTOMY  2014    ECTOPIC PREGNANCY SURGERY  2018    GALLBLADDER SURGERY         SOCIAL HISTORY:  Social History     Socioeconomic History    Marital status:    Occupational History    Occupation: [employer]   Tobacco Use    Smoking  status: Never    Smokeless tobacco: Never   Substance and Sexual Activity    Alcohol use: No     Comment: socially    Drug use: No    Sexual activity: Yes     Partners: Male     Birth control/protection: None     Social Determinants of Health     Financial Resource Strain: Low Risk     Difficulty of Paying Living Expenses: Not hard at all   Food Insecurity: No Food Insecurity    Worried About Running Out of Food in the Last Year: Never true    Ran Out of Food in the Last Year: Never true   Transportation Needs: No Transportation Needs    Lack of Transportation (Medical): No    Lack of Transportation (Non-Medical): No   Physical Activity: Sufficiently Active    Days of Exercise per Week: 5 days    Minutes of Exercise per Session: 60 min   Stress: No Stress Concern Present    Feeling of Stress : Not at all   Social Connections: Unknown    Frequency of Communication with Friends and Family: More than three times a week    Frequency of Social Gatherings with Friends and Family: More than three times a week    Active Member of Clubs or Organizations: No    Attends Club or Organization Meetings: Patient refused    Marital Status:    Housing Stability: Low Risk     Unable to Pay for Housing in the Last Year: No    Number of Places Lived in the Last Year: 2    Unstable Housing in the Last Year: No       FAMILY HISTORY:  Family History   Problem Relation Age of Onset    Heart disease Mother 47    Diabetes Mother     Kidney disease Mother         on dialysis    Sickle cell anemia Mother     COPD Father     Heart failure Father     Breast cancer Sister 49    Hypertension Sister     Hashimoto's thyroiditis Sister     Scoliosis Sister     Lung cancer Maternal Grandmother         smoker    Sickle cell anemia Maternal Grandfather     Heart failure Paternal Grandfather     Diabetes Brother     Diabetes Brother     Diabetes Brother     Hypertension Brother        ALLERGIES AND MEDICATIONS: updated and reviewed.  Review of  patient's allergies indicates:   Allergen Reactions    Dilaudid [hydromorphone (bulk)] Hives    Penicillins      hives     Medication List with Changes/Refills   Current Medications    CHLORTHALIDONE (HYGROTEN) 25 MG TAB    Take 1 tablet (25 mg total) by mouth once daily.    CLINDAMYCIN PHOSPHATE 1% (CLINDAGEL) 1 % GEL        PRENAT.VITS,KYLIE,MIN-IRON-FOLIC (PRENATAL VITAMIN) TAB    Take 1 tablet by mouth once daily.    SPIRONOLACTONE (ALDACTONE) 100 MG TABLET        TOBRAMYCIN SULFATE 0.3% (TOBREX) 0.3 % OPHTHALMIC SOLUTION    Place into both eyes.    TOPIRAMATE (TOPAMAX) 25 MG TABLET    Take 1 tablet (25 mg total) by mouth 2 (two) times a day.          CARE TEAM:  Patient Care Team:  Chadwick Oakley MD as PCP - General (Internal Medicine)         REVIEW OF SYSTEMS:  Review of Systems   Constitutional:  Negative for activity change and unexpected weight change.   HENT:  Negative for hearing loss, rhinorrhea and trouble swallowing.    Eyes:  Negative for discharge and visual disturbance.   Respiratory:  Negative for chest tightness and wheezing.    Cardiovascular:  Negative for chest pain and palpitations.   Gastrointestinal:  Positive for constipation. Negative for blood in stool, diarrhea and vomiting.   Endocrine: Negative for polydipsia and polyuria.   Genitourinary:  Negative for difficulty urinating, dysuria, hematuria and menstrual problem.   Musculoskeletal:  Negative for arthralgias, joint swelling and neck pain.   Neurological:  Negative for weakness and headaches.   Psychiatric/Behavioral:  Negative for confusion and dysphoric mood.        PHYSICAL EXAM:      General appearance - alert, well appearing, and in no distress      ASSESSMENT AND PLAN:  Class 3 severe obesity due to excess calories with body mass index (BMI) of 40.0 to 44.9 in adult, unspecified whether serious comorbidity present       - Continue topiramate and counseled for diet/exercise measures.  To follow up in 3 months to monitor progress.   Reinforced pregnancy prevention while on medicine.    Anemia, unspecified type  -     CBC Auto Differential; Future; Expected date: 05/30/2023  -     Iron and TIBC; Future; Expected date: 05/30/2023  -     Ferritin; Future; Expected date: 05/30/2023  -     Continue PNV,calcium 72-iron-folic acid (PRENATAL VITAMIN PLUS LOW IRON) 27 mg iron- 1 mg Tab; Take 1 tablet (1 each total) by mouth once daily.  Dispense: 30 tablet; Refill: 11              Follow up 3 months or sooner as needed.

## 2023-05-31 ENCOUNTER — TELEPHONE (OUTPATIENT)
Dept: FAMILY MEDICINE | Facility: CLINIC | Age: 43
End: 2023-05-31
Payer: COMMERCIAL

## 2023-05-31 NOTE — TELEPHONE ENCOUNTER
----- Message from Reggie Trotter RN sent at 5/30/2023  5:00 PM CDT -----    ----- Message -----  From: Chadwick Oakley MD  Sent: 5/30/2023   4:39 PM CDT  To: Adin Cervantes Staff    Please call to schedule non fasting lab 3 months and in person visit 3 months

## 2023-06-05 DIAGNOSIS — D50.0 IRON DEFICIENCY ANEMIA DUE TO CHRONIC BLOOD LOSS: Primary | ICD-10-CM

## 2023-06-05 NOTE — TELEPHONE ENCOUNTER
Notified patient that lab orders went sent to [facility]. She verbalized understanding.

## 2023-06-15 LAB
T4 FREE SERPL-MCNC: 1 NG/DL (ref 0.8–1.8)
THYROPEROXIDASE AB SERPL-ACNC: 153 IU/ML
TSH SERPL-ACNC: 1.5 MIU/L

## 2023-07-20 ENCOUNTER — PATIENT MESSAGE (OUTPATIENT)
Dept: FAMILY MEDICINE | Facility: CLINIC | Age: 43
End: 2023-07-20
Payer: COMMERCIAL

## 2023-09-06 ENCOUNTER — OFFICE VISIT (OUTPATIENT)
Dept: FAMILY MEDICINE | Facility: CLINIC | Age: 43
End: 2023-09-06
Payer: COMMERCIAL

## 2023-09-06 ENCOUNTER — PATIENT MESSAGE (OUTPATIENT)
Dept: FAMILY MEDICINE | Facility: CLINIC | Age: 43
End: 2023-09-06
Payer: COMMERCIAL

## 2023-09-06 VITALS
HEART RATE: 69 BPM | BODY MASS INDEX: 41.29 KG/M2 | OXYGEN SATURATION: 98 % | DIASTOLIC BLOOD PRESSURE: 84 MMHG | WEIGHT: 233 LBS | SYSTOLIC BLOOD PRESSURE: 128 MMHG | TEMPERATURE: 98 F | HEIGHT: 63 IN

## 2023-09-06 DIAGNOSIS — I10 ESSENTIAL HYPERTENSION: Primary | ICD-10-CM

## 2023-09-06 DIAGNOSIS — L70.9 ACNE, UNSPECIFIED ACNE TYPE: ICD-10-CM

## 2023-09-06 DIAGNOSIS — E66.01 CLASS 3 SEVERE OBESITY DUE TO EXCESS CALORIES WITH SERIOUS COMORBIDITY AND BODY MASS INDEX (BMI) OF 40.0 TO 44.9 IN ADULT: ICD-10-CM

## 2023-09-06 PROCEDURE — 99214 PR OFFICE/OUTPT VISIT, EST, LEVL IV, 30-39 MIN: ICD-10-PCS | Mod: S$GLB,,, | Performed by: INTERNAL MEDICINE

## 2023-09-06 PROCEDURE — 99214 OFFICE O/P EST MOD 30 MIN: CPT | Mod: S$GLB,,, | Performed by: INTERNAL MEDICINE

## 2023-09-06 PROCEDURE — 99999 PR PBB SHADOW E&M-EST. PATIENT-LVL IV: CPT | Mod: PBBFAC,,, | Performed by: INTERNAL MEDICINE

## 2023-09-06 PROCEDURE — 99999 PR PBB SHADOW E&M-EST. PATIENT-LVL IV: ICD-10-PCS | Mod: PBBFAC,,, | Performed by: INTERNAL MEDICINE

## 2023-09-06 RX ORDER — CLINDAMYCIN PHOSPHATE 10 MG/G
GEL TOPICAL 2 TIMES DAILY
Qty: 60 G | Refills: 0 | Status: SHIPPED | OUTPATIENT
Start: 2023-09-06 | End: 2024-01-02 | Stop reason: SDUPTHER

## 2023-09-06 NOTE — PROGRESS NOTES
HISTORY OF PRESENT ILLNESS:  Ana Wharton is a 43 y.o. female who presents to the clinic today for Follow-up    Prescribed topiramate.  Weight today is 233 lb from 229 lb in April 2023 and 227 in Feb 2023.  She is not taking topiramate yet.  Uses condoms for contraception.  Eating: Intermittent fasting.  Exercise: Not exercising.    Goals: wants to lose 33 lbs.  Plans for 1.5 hours each day three days per week physical activity.    Patient was 30 min late for appt today.    PAST MEDICAL HISTORY:  Past Medical History:   Diagnosis Date    Hypertension     Miscarriage 2018    Thyroid disease     hyperthyroidism        PAST SURGICAL HISTORY:  Past Surgical History:   Procedure Laterality Date    CHOLECYSTECTOMY  2014    ECTOPIC PREGNANCY SURGERY  2018    GALLBLADDER SURGERY         SOCIAL HISTORY:  Social History     Socioeconomic History    Marital status:    Occupational History    Occupation: [employer]   Tobacco Use    Smoking status: Never    Smokeless tobacco: Never   Substance and Sexual Activity    Alcohol use: No     Comment: socially    Drug use: No    Sexual activity: Yes     Partners: Male     Birth control/protection: None     Social Determinants of Health     Financial Resource Strain: Low Risk  (4/22/2023)    Overall Financial Resource Strain (CARDIA)     Difficulty of Paying Living Expenses: Not hard at all   Food Insecurity: No Food Insecurity (4/22/2023)    Hunger Vital Sign     Worried About Running Out of Food in the Last Year: Never true     Ran Out of Food in the Last Year: Never true   Transportation Needs: No Transportation Needs (4/22/2023)    PRAPARE - Transportation     Lack of Transportation (Medical): No     Lack of Transportation (Non-Medical): No   Physical Activity: Sufficiently Active (4/22/2023)    Exercise Vital Sign     Days of Exercise per Week: 5 days     Minutes of Exercise per Session: 60 min   Stress: No Stress Concern Present (4/22/2023)    [facility]  Occupational Health - Occupational Stress Questionnaire     Feeling of Stress : Not at all   Social Connections: Unknown (4/22/2023)    Social Connection and Isolation Panel [NHANES]     Frequency of Communication with Friends and Family: More than three times a week     Frequency of Social Gatherings with Friends and Family: More than three times a week     Active Member of Clubs or Organizations: No     Attends Club or Organization Meetings: Patient refused     Marital Status:    Housing Stability: Low Risk  (4/22/2023)    Housing Stability Vital Sign     Unable to Pay for Housing in the Last Year: No     Number of Places Lived in the Last Year: 2     Unstable Housing in the Last Year: No       FAMILY HISTORY:  Family History   Problem Relation Age of Onset    Heart disease Mother 47    Diabetes Mother     Kidney disease Mother         on dialysis    Sickle cell anemia Mother     COPD Father     Heart failure Father     Breast cancer Sister 49    Hypertension Sister     Hashimoto's thyroiditis Sister     Scoliosis Sister     Lung cancer Maternal Grandmother         smoker    Sickle cell anemia Maternal Grandfather     Heart failure Paternal Grandfather     Diabetes Brother     Diabetes Brother     Diabetes Brother     Hypertension Brother        ALLERGIES AND MEDICATIONS: updated and reviewed.  Review of patient's allergies indicates:   Allergen Reactions    Dilaudid [hydromorphone (bulk)] Hives    Penicillins      hives     Medication List with Changes/Refills   Current Medications    CHLORTHALIDONE (HYGROTEN) 25 MG TAB    Take 1 tablet (25 mg total) by mouth once daily.    CLINDAMYCIN PHOSPHATE 1% (CLINDAGEL) 1 % GEL        PRENAT.VITS,KYLIE,MIN-IRON-FOLIC (PRENATAL VITAMIN) TAB    Take 1 tablet by mouth once daily.    SPIRONOLACTONE (ALDACTONE) 100 MG TABLET        TOBRAMYCIN SULFATE 0.3% (TOBREX) 0.3 % OPHTHALMIC SOLUTION    Place into both eyes.    TOPIRAMATE (TOPAMAX) 25 MG TABLET    Take 1 tablet (25  mg total) by mouth 2 (two) times a day.          CARE TEAM:  Patient Care Team:  Chadwick Oakley MD as PCP - General (Internal Medicine)         REVIEW OF SYSTEMS:  Review of Systems   Constitutional:  Negative for chills and fever.   HENT:  Negative for congestion and postnasal drip.    Eyes:  Negative for photophobia and visual disturbance.   Respiratory:  Negative for cough and shortness of breath.    Cardiovascular:  Negative for chest pain and palpitations.   Gastrointestinal:  Negative for nausea and vomiting.   Genitourinary:  Negative for dysuria and frequency.   Musculoskeletal:  Negative for arthralgias and back pain.   Neurological:  Negative for light-headedness and headaches.   Psychiatric/Behavioral:  Negative for dysphoric mood. The patient is not nervous/anxious.    All other systems reviewed and are negative.        PHYSICAL EXAM:   Vitals:    09/06/23 1406   BP: 128/84   Pulse: 69   Temp: 98.4 °F (36.9 °C)             Body mass index is 41.28 kg/m².     General appearance - alert, well appearing, and in no distress and obese  Mental status - alert, oriented to person, place, and time  Eyes - pupils equal and reactive, extraocular eye movements intact  Chest - clear to auscultation, no wheezes, rales or rhonchi, symmetric air entry  Neurological - alert, oriented, normal speech, no focal findings or movement disorder noted  Extremities - peripheral pulses normal, no pedal edema, no clubbing or cyanosis      ASSESSMENT AND PLAN:  Essential hypertension  Class 3 severe obesity due to excess calories with serious comorbidity and body mass index (BMI) of 40.0 to 44.9 in adult        - Stable on current bp meds.        - Patient was advised for adequate contraception while taking topiramate and to consider seeing gyn for initiation of OCP or IUD for full prevention of pregnancy while taking medication.        - Reinforced setting continued goals toward weight loss through healthy eating and  exercise.    Acne, unspecified acne type  -     clindamycin phosphate 1% (CLINDAGEL) 1 % gel; Apply topically 2 (two) times daily.  Dispense: 60 g; Refill: 0               Follow up 3 months or sooner as needed.

## 2023-09-07 ENCOUNTER — PATIENT MESSAGE (OUTPATIENT)
Dept: FAMILY MEDICINE | Facility: CLINIC | Age: 43
End: 2023-09-07
Payer: COMMERCIAL

## 2023-09-07 PROBLEM — D57.3 SICKLE CELL TRAIT: Status: ACTIVE | Noted: 2023-09-07

## 2023-09-07 LAB
BASOPHILS # BLD AUTO: 34 CELLS/UL (ref 0–200)
BASOPHILS NFR BLD AUTO: 0.4 %
EOSINOPHIL # BLD AUTO: 134 CELLS/UL (ref 15–500)
EOSINOPHIL NFR BLD AUTO: 1.6 %
ERYTHROCYTE [DISTWIDTH] IN BLOOD BY AUTOMATED COUNT: 13.1 % (ref 11–15)
FERRITIN SERPL-MCNC: 7 NG/ML (ref 16–232)
HCT VFR BLD AUTO: 34.5 % (ref 35–45)
HGB BLD-MCNC: 10.9 G/DL (ref 11.7–15.5)
IRON SATN MFR SERPL: 8 % (CALC) (ref 16–45)
IRON SERPL-MCNC: 29 MCG/DL (ref 40–190)
LYMPHOCYTES # BLD AUTO: 2436 CELLS/UL (ref 850–3900)
LYMPHOCYTES NFR BLD AUTO: 29 %
MCH RBC QN AUTO: 29.5 PG (ref 27–33)
MCHC RBC AUTO-ENTMCNC: 31.6 G/DL (ref 32–36)
MCV RBC AUTO: 93.5 FL (ref 80–100)
MONOCYTES # BLD AUTO: 798 CELLS/UL (ref 200–950)
MONOCYTES NFR BLD AUTO: 9.5 %
NEUTROPHILS # BLD AUTO: 4998 CELLS/UL (ref 1500–7800)
NEUTROPHILS NFR BLD AUTO: 59.5 %
PLATELET # BLD AUTO: 301 THOUSAND/UL (ref 140–400)
PMV BLD REES-ECKER: 11.1 FL (ref 7.5–12.5)
RBC # BLD AUTO: 3.69 MILLION/UL (ref 3.8–5.1)
TIBC SERPL-MCNC: 350 MCG/DL (CALC) (ref 250–450)
WBC # BLD AUTO: 8.4 THOUSAND/UL (ref 3.8–10.8)

## 2023-09-07 NOTE — TELEPHONE ENCOUNTER
Called patient to explain to her that she has received the first available appt. Patient was also notified that she has been added to the waitlist. Patient verbalized understanding.

## 2023-09-12 ENCOUNTER — TELEPHONE (OUTPATIENT)
Dept: HEMATOLOGY/ONCOLOGY | Facility: CLINIC | Age: 43
End: 2023-09-12
Payer: COMMERCIAL

## 2023-09-12 ENCOUNTER — PATIENT MESSAGE (OUTPATIENT)
Dept: FAMILY MEDICINE | Facility: CLINIC | Age: 43
End: 2023-09-12
Payer: COMMERCIAL

## 2023-09-12 DIAGNOSIS — D57.3 SICKLE CELL TRAIT: Primary | ICD-10-CM

## 2023-09-12 NOTE — TELEPHONE ENCOUNTER
Good afternoon  Our office received your referral for a sickle cell trait. Unfortunately we do not evaluate patients with sickle cell concerns at the  [facility] [place]. Please refer them to [facility] as they have   a special clinic for this diagnosis and many resources to offer them that we do not have in the satellite clinic.  Thank you for you understanding   Siddhartha Castro RN

## 2023-09-27 ENCOUNTER — PATIENT MESSAGE (OUTPATIENT)
Dept: FAMILY MEDICINE | Facility: CLINIC | Age: 43
End: 2023-09-27
Payer: COMMERCIAL

## 2023-10-16 ENCOUNTER — TELEPHONE (OUTPATIENT)
Dept: HEMATOLOGY/ONCOLOGY | Facility: CLINIC | Age: 43
End: 2023-10-16
Payer: COMMERCIAL

## 2023-10-16 ENCOUNTER — OFFICE VISIT (OUTPATIENT)
Dept: FAMILY MEDICINE | Facility: CLINIC | Age: 43
End: 2023-10-16
Payer: COMMERCIAL

## 2023-10-16 DIAGNOSIS — F41.9 ANXIETY: Primary | ICD-10-CM

## 2023-10-16 DIAGNOSIS — D57.3 SICKLE CELL TRAIT: ICD-10-CM

## 2023-10-16 DIAGNOSIS — D64.9 ANEMIA, UNSPECIFIED TYPE: ICD-10-CM

## 2023-10-16 PROCEDURE — 99213 OFFICE O/P EST LOW 20 MIN: CPT | Mod: 95,,, | Performed by: INTERNAL MEDICINE

## 2023-10-16 PROCEDURE — 99213 PR OFFICE/OUTPT VISIT, EST, LEVL III, 20-29 MIN: ICD-10-PCS | Mod: 95,,, | Performed by: INTERNAL MEDICINE

## 2023-10-16 RX ORDER — HYDROXYZINE HYDROCHLORIDE 25 MG/1
25 TABLET, FILM COATED ORAL 3 TIMES DAILY PRN
Qty: 50 TABLET | Refills: 1 | Status: SHIPPED | OUTPATIENT
Start: 2023-10-16 | End: 2023-10-19

## 2023-10-16 NOTE — TELEPHONE ENCOUNTER
Tc returned to pt Advised her that she will have to have her PCP place a referral for anemia if that is what she would like us to evaluate her for.  Informed we will contact her and schedule her as soon as we receive referrral The only one we did receive was for a sickle cell trait and I notified physician that those have to be sent to [facility] genetic department  She agreed to contact phy to place referral for anemia

## 2023-10-16 NOTE — TELEPHONE ENCOUNTER
Referral rec'd for sickle cell trait isaac  Advised PCP that we do not address sickle cell I concerns in  our office but since she does not have the disease she can refer her to genetics at [facility] for an evaluation

## 2023-10-16 NOTE — PROGRESS NOTES
HISTORY OF PRESENT ILLNESS:  Ana Wharton is a 43 y.o. female who presents to the clinic today for anxiety and anemia.     The patient location is: [place]  The chief complaint leading to consultation is: anxiety and anemia.    Visit type: audiovisual    Face to Face time with patient: 10 min   15 minutes of total time spent on the encounter, which includes face to face time and non-face to face time preparing to see the patient (eg, review of tests), Obtaining and/or reviewing separately obtained history, Documenting clinical information in the electronic or other health record, Independently interpreting results (not separately reported) and communicating results to the patient/family/caregiver, or Care coordination (not separately reported).         Each patient to whom he or she provides medical services by telemedicine is:  (1) informed of the relationship between the physician and patient and the respective role of any other health care provider with respect to management of the patient; and (2) notified that he or she may decline to receive medical services by telemedicine and may withdraw from such care at any time.    Notes:   Anxiety in dealing with family members.  Uncle and grandma passed away this month.  Notes panic attacks occurring more frequently.  No difficulty sleeping.  Does not want to take medication.  [place] [facility] [place] next week.      PAST MEDICAL HISTORY:  Past Medical History:   Diagnosis Date    Hypertension     Miscarriage 2018    Thyroid disease     hyperthyroidism        PAST SURGICAL HISTORY:  Past Surgical History:   Procedure Laterality Date    CHOLECYSTECTOMY  2014    ECTOPIC PREGNANCY SURGERY  2018    GALLBLADDER SURGERY         SOCIAL HISTORY:  Social History     Socioeconomic History    Marital status:    Occupational History    Occupation: [employer]   Tobacco Use    Smoking status: Never    Smokeless tobacco: Never   Substance  and Sexual Activity    Alcohol use: No     Comment: socially    Drug use: No    Sexual activity: Yes     Partners: Male     Birth control/protection: None     Social Determinants of Health     Financial Resource Strain: Low Risk  (4/22/2023)    Overall Financial Resource Strain (CARDIA)     Difficulty of Paying Living Expenses: Not hard at all   Food Insecurity: No Food Insecurity (4/22/2023)    Hunger Vital Sign     Worried About Running Out of Food in the Last Year: Never true     Ran Out of Food in the Last Year: Never true   Transportation Needs: No Transportation Needs (4/22/2023)    PRAPARE - Transportation     Lack of Transportation (Medical): No     Lack of Transportation (Non-Medical): No   Physical Activity: Sufficiently Active (4/22/2023)    Exercise Vital Sign     Days of Exercise per Week: 5 days     Minutes of Exercise per Session: 60 min   Stress: No Stress Concern Present (4/22/2023)    [employer] - Occupational Stress Questionnaire     Feeling of Stress : Not at all   Social Connections: Unknown (4/22/2023)    Social Connection and Isolation Panel [NHANES]     Frequency of Communication with Friends and Family: More than three times a week     Frequency of Social Gatherings with Friends and Family: More than three times a week     Active Member of Clubs or Organizations: No     Attends Club or Organization Meetings: Patient refused     Marital Status:    Housing Stability: Low Risk  (4/22/2023)    Housing Stability Vital Sign     Unable to Pay for Housing in the Last Year: No     Number of Places Lived in the Last Year: 2     Unstable Housing in the Last Year: No       FAMILY HISTORY:  Family History   Problem Relation Age of Onset    Heart disease Mother 47    Diabetes Mother     Kidney disease Mother         on dialysis    Sickle cell anemia Mother     COPD Father     Heart failure Father     Breast cancer Sister 49    Hypertension Sister     Hashimoto's  thyroiditis Sister     Scoliosis Sister     Lung cancer Maternal Grandmother         smoker    Sickle cell anemia Maternal Grandfather     Heart failure Paternal Grandfather     Diabetes Brother     Diabetes Brother     Diabetes Brother     Hypertension Brother        ALLERGIES AND MEDICATIONS: updated and reviewed.  Review of patient's allergies indicates:   Allergen Reactions    Dilaudid [hydromorphone (bulk)] Hives    Penicillins      hives     Medication List with Changes/Refills   Current Medications    CHLORTHALIDONE (HYGROTEN) 25 MG TAB    Take 1 tablet (25 mg total) by mouth once daily.    CLINDAMYCIN PHOSPHATE 1% (CLINDAGEL) 1 % GEL    Apply topically 2 (two) times daily.    PRENAT.VITS,KYLIE,MIN-IRON-FOLIC (PRENATAL VITAMIN) TAB    Take 1 tablet by mouth once daily.    SPIRONOLACTONE (ALDACTONE) 100 MG TABLET        TOBRAMYCIN SULFATE 0.3% (TOBREX) 0.3 % OPHTHALMIC SOLUTION    Place into both eyes.    TOPIRAMATE (TOPAMAX) 25 MG TABLET    Take 1 tablet (25 mg total) by mouth 2 (two) times a day.          CARE TEAM:  Patient Care Team:  Chadwick Oakley MD as PCP - General (Internal Medicine)         REVIEW OF SYSTEMS:  Review of Systems   Constitutional:  Negative for fever.   HENT:  Negative for ear pain, rhinorrhea and sore throat.    Respiratory:  Positive for shortness of breath. Negative for wheezing.    Cardiovascular:  Negative for chest pain and leg swelling.   Gastrointestinal:  Negative for abdominal pain and vomiting.   Musculoskeletal:  Negative for neck pain.   Skin:  Negative for rash.   Neurological:  Negative for headaches.   Psychiatric/Behavioral:  Negative for sleep disturbance. The patient is nervous/anxious.    All other systems reviewed and are negative.        PHYSICAL EXAM:      General appearance - alert, well appearing, and in no distress      ASSESSMENT AND PLAN:  Anxiety  -     Ambulatory referral/consult to Psychiatry; Future; Expected date: 10/23/2023  -     hydrOXYzine HCL  (ATARAX) 25 MG tablet; Take 1 tablet (25 mg total) by mouth 3 (three) times daily as needed for Anxiety.  Dispense: 50 tablet; Refill: 1  - Offered daily medication but declined.  Advised for follow up with therapist.  - FMLA was completed.    Anemia, unspecified type  -     CBC Auto Differential; Future; Expected date: 10/16/2023  -     Iron and TIBC; Future; Expected date: 10/16/2023  -     Ferritin; Future; Expected date: 10/16/2023    Sickle cell trait  -     Ambulatory referral/consult to Hematology / Oncology; Future; Expected date: 10/23/2023              Follow up 3 months or sooner as needed.    Answers submitted by the patient for this visit:  Shortness of Breath Questionnaire (Submitted on 10/16/2023)  Chief Complaint: Shortness of breath  Onset: in the past 7 days  Frequency: rarely  Progression since onset: gradually improving  Episode duration: 4 Seconds  claudication: No  coryza: No  hemoptysis: No  leg pain: No  orthopnea: No  PND: No  sputum production: No  swollen glands: No  syncope: No  Improvement on treatment: no relief  Risk factors for DVT/PE: no known risk factors  asthma: No  allergies: No  COPD: No  pneumonia: No  aspirin allergies: No  CAD: No  DVT: No  heart failure: No  PE: No  recent surgery: No  bronchiolitis: No  chronic lung disease: No

## 2023-10-17 ENCOUNTER — TELEPHONE (OUTPATIENT)
Dept: HEMATOLOGY/ONCOLOGY | Facility: CLINIC | Age: 43
End: 2023-10-17
Payer: COMMERCIAL

## 2023-10-17 NOTE — TELEPHONE ENCOUNTER
I received a referral on the above patient for anemia  She lives on [address]. Can you please contact her and schedule her as she seems to receive her care at [facility]  . Thanks anne

## 2023-10-18 ENCOUNTER — PATIENT MESSAGE (OUTPATIENT)
Dept: FAMILY MEDICINE | Facility: CLINIC | Age: 43
End: 2023-10-18
Payer: COMMERCIAL

## 2023-10-19 ENCOUNTER — PATIENT MESSAGE (OUTPATIENT)
Dept: HEMATOLOGY/ONCOLOGY | Facility: CLINIC | Age: 43
End: 2023-10-19

## 2023-10-19 ENCOUNTER — OFFICE VISIT (OUTPATIENT)
Dept: HEMATOLOGY/ONCOLOGY | Facility: CLINIC | Age: 43
End: 2023-10-19
Payer: COMMERCIAL

## 2023-10-19 VITALS
TEMPERATURE: 99 F | WEIGHT: 233.13 LBS | OXYGEN SATURATION: 99 % | SYSTOLIC BLOOD PRESSURE: 144 MMHG | BODY MASS INDEX: 41.3 KG/M2 | HEART RATE: 113 BPM | RESPIRATION RATE: 18 BRPM | DIASTOLIC BLOOD PRESSURE: 92 MMHG

## 2023-10-19 DIAGNOSIS — D57.3 SICKLE CELL TRAIT: ICD-10-CM

## 2023-10-19 DIAGNOSIS — E06.3 HASHIMOTO'S THYROIDITIS: ICD-10-CM

## 2023-10-19 DIAGNOSIS — D50.0 IRON DEFICIENCY ANEMIA DUE TO CHRONIC BLOOD LOSS: ICD-10-CM

## 2023-10-19 DIAGNOSIS — D50.0 IRON DEFICIENCY ANEMIA DUE TO CHRONIC BLOOD LOSS: Primary | ICD-10-CM

## 2023-10-19 PROCEDURE — 99999 PR PBB SHADOW E&M-EST. PATIENT-LVL III: CPT | Mod: PBBFAC,,, | Performed by: INTERNAL MEDICINE

## 2023-10-19 PROCEDURE — 99214 PR OFFICE/OUTPT VISIT, EST, LEVL IV, 30-39 MIN: ICD-10-PCS | Mod: S$GLB,,, | Performed by: INTERNAL MEDICINE

## 2023-10-19 PROCEDURE — 99214 OFFICE O/P EST MOD 30 MIN: CPT | Mod: S$GLB,,, | Performed by: INTERNAL MEDICINE

## 2023-10-19 PROCEDURE — 99999 PR PBB SHADOW E&M-EST. PATIENT-LVL III: ICD-10-PCS | Mod: PBBFAC,,, | Performed by: INTERNAL MEDICINE

## 2023-10-19 RX ORDER — PRENATAL WITH FERROUS FUM AND FOLIC ACID 3080; 920; 120; 400; 22; 1.84; 3; 20; 10; 1; 12; 200; 27; 25; 2 [IU]/1; [IU]/1; MG/1; [IU]/1; MG/1; MG/1; MG/1; MG/1; MG/1; MG/1; UG/1; MG/1; MG/1; MG/1; MG/1
1 TABLET ORAL
COMMUNITY
Start: 2023-06-01

## 2023-10-19 RX ORDER — SODIUM CHLORIDE 0.9 % (FLUSH) 0.9 %
10 SYRINGE (ML) INJECTION
Status: CANCELLED | OUTPATIENT
Start: 2023-11-09

## 2023-10-19 RX ORDER — HEPARIN 100 UNIT/ML
500 SYRINGE INTRAVENOUS
Status: CANCELLED | OUTPATIENT
Start: 2023-11-02

## 2023-10-19 RX ORDER — HEPARIN 100 UNIT/ML
500 SYRINGE INTRAVENOUS
Status: CANCELLED | OUTPATIENT
Start: 2023-11-09

## 2023-10-19 RX ORDER — SODIUM CHLORIDE 0.9 % (FLUSH) 0.9 %
10 SYRINGE (ML) INJECTION
Status: CANCELLED | OUTPATIENT
Start: 2023-11-02

## 2023-10-19 NOTE — PROGRESS NOTES
Send [facility] lab orders    Answers submitted by the patient for this visit:  Review of Systems Questionnaire (Submitted on 10/19/2023)  appetite change : No  unexpected weight change: No  mouth sores: No  visual disturbance: No  cough: No  shortness of breath: Yes  chest pain: Yes  abdominal pain: No  diarrhea: No  frequency: No  back pain: No  rash: No  headaches: No  adenopathy: No  nervous/ anxious: No

## 2023-10-19 NOTE — Clinical Note
-please schedule weekly feraheme infusions x 2  asap -cbc, ferritin lab through [employer] and virtual md appt 2-3 months after iron infusions -pt will need [facility] cbc, ferritin lab orders sent to  her over portal

## 2023-10-19 NOTE — PROGRESS NOTES
SECTION OF HEMATOLOGY AND BONE MARROW TRANSPLANT  New Patient Visit   10/20/2023  Referred by:  Dr. Chadwick Oakley  Referred for: anemia    CHIEF COMPLAINT:   Chief Complaint   Patient presents with    Fatigue       HISTORY OF PRESENT ILLNESS:   43 y.o. female; referred for GABBY; pt notes chronic menorrhagia unchanged; has established gyne.   Also confirmed sickle trait.  No other clinical bleeding.  Eats iron adequate diet.    Notes fatigue but other overt anemia symptoms.   On po iron supplement daily but causes dyspepsia.  Denies fever, chills, nightsweats, bleeding, brusing, lymphadenopathy, signs/symptoms of splenomegaly.      PAST MEDICAL HISTORY:   Past Medical History:   Diagnosis Date    Hypertension     Miscarriage 2018    Thyroid disease     hyperthyroidism        PAST SURGICAL HISTORY:   Past Surgical History:   Procedure Laterality Date    CHOLECYSTECTOMY  2014    ECTOPIC PREGNANCY SURGERY  2018    GALLBLADDER SURGERY         PAST SOCIAL HISTORY:   reports that she has never smoked. She has never used smokeless tobacco. She reports that she does not drink alcohol and does not use drugs.    FAMILY HISTORY:  Family History   Problem Relation Age of Onset    Heart disease Mother 47    Diabetes Mother     Kidney disease Mother         on dialysis    Sickle cell anemia Mother     COPD Father     Heart failure Father     Breast cancer Sister 49    Hypertension Sister     Hashimoto's thyroiditis Sister     Scoliosis Sister     Lung cancer Maternal Grandmother         smoker    Sickle cell anemia Maternal Grandfather     Heart failure Paternal Grandfather     Diabetes Brother     Diabetes Brother     Diabetes Brother     Hypertension Brother        CURRENT MEDICATIONS:   Current Outpatient Medications   Medication Sig    clindamycin phosphate 1% (CLINDAGEL) 1 % gel Apply topically 2 (two) times daily.    PRENATAL VITAMIN PLUS LOW IRON 27 mg iron- 1 mg Tab Take 1 tablet by mouth.    topiramate (TOPAMAX) 25 MG  tablet Take 1 tablet (25 mg total) by mouth 2 (two) times a day.     No current facility-administered medications for this visit.     ALLERGIES:   Review of patient's allergies indicates:   Allergen Reactions    Dilaudid [hydromorphone (bulk)] Hives    Penicillins      hives             REVIEW OF SYSTEMS:   General ROS: positive for  - fatigue  Psychological ROS: negative  Ophthalmic ROS: negative  ENT ROS: negative  Allergy and Immunology ROS: negative  Hematological and Lymphatic ROS: see HPI  Endocrine ROS: negative  Respiratory ROS: negative  Cardiovascular ROS: negative  Gastrointestinal ROS: negative  Genito-Urinary ROS: negative  Musculoskeletal ROS: negative  Neurological ROS: negative  Dermatological ROS: negative    PHYSICAL EXAM:   Vitals:    10/19/23 1551   BP: (!) 144/92   Pulse: (!) 113   Resp: 18   Temp: 98.6 °F (37 °C)         General - well developed, well nourished, no apparent distress  Head & Face - no sinus tenderness  Eyes - normal conjunctivae and lids   ENT - normal external auditory canals and tympanic membranes bilaterally oropharynx clear,  Normal dentition and gums  Neck - normal thyroid  Chest and Lung - normal respiratory effort, clear to auscultation bilaterally   Cardiovascular - RRR with no MGR, normal S1 and S2; no pedal edema  Abdomen -  soft, nontender, no palpable hepatomegaly or splenomegaly  Lymph - no palpable lymphadenopathy  Extremities - unremarkable nails and digits  Heme - no bruising, petechiae, pallor  Skin - no rashes or lesions  Psych - appropriate mood and affect      ECOG Performance Status: (foot note - ECOG PS provided by Eastern Cooperative Oncology Group) 0 - Asymptomatic    Karnofsky Performance Score:  90%- Able to Carry on Normal Activity: Minor Symptoms of Disease  DATA:   Lab Results   Component Value Date    WBC 8.4 09/06/2023    HGB 10.9 (L) 09/06/2023    HCT 34.5 (L) 09/06/2023    MCV 93.5 09/06/2023     09/06/2023       Gran # (ANC)   Date Value  Ref Range Status   02/18/2021 4.1 1.8 - 7.7 K/uL Final     Gran %   Date Value Ref Range Status   02/18/2021 57.2 38.0 - 73.0 % Final     CMP  Sodium   Date Value Ref Range Status   01/23/2023 139 135 - 146 mmol/L Final     Potassium   Date Value Ref Range Status   01/23/2023 3.8 3.5 - 5.3 mmol/L Final     Chloride   Date Value Ref Range Status   01/23/2023 105 98 - 110 mmol/L Final     CO2   Date Value Ref Range Status   01/23/2023 29 20 - 32 mmol/L Final     Glucose   Date Value Ref Range Status   01/23/2023 87 65 - 99 mg/dL Final     Comment:                   Fasting reference interval          BUN   Date Value Ref Range Status   01/23/2023 7 7 - 25 mg/dL Final     Creatinine   Date Value Ref Range Status   01/23/2023 0.90 0.50 - 0.99 mg/dL Final     Calcium   Date Value Ref Range Status   01/23/2023 8.9 8.6 - 10.2 mg/dL Final     Total Protein   Date Value Ref Range Status   01/23/2023 7.2 6.1 - 8.1 g/dL Final     Albumin   Date Value Ref Range Status   01/23/2023 4.0 3.6 - 5.1 g/dL Final     Total Bilirubin   Date Value Ref Range Status   01/23/2023 0.5 0.2 - 1.2 mg/dL Final     Alkaline Phosphatase   Date Value Ref Range Status   02/18/2021 63 55 - 135 U/L Final     AST   Date Value Ref Range Status   01/23/2023 13 10 - 30 U/L Final     ALT   Date Value Ref Range Status   01/23/2023 17 6 - 29 U/L Final     Anion Gap   Date Value Ref Range Status   02/18/2021 6 (L) 8 - 16 mmol/L Final     eGFR   Date Value Ref Range Status   01/23/2023 82 > OR = 60 mL/min/1.73m2 Final     Comment:     The eGFR is based on the CKD-EPI 2021 equation. To calculate   the new eGFR from a previous Creatinine or Cystatin C  result, go to https://www.kidney.org/professionals/  kdoqi/gfr%5Fcalculator       Lab Results   Component Value Date    IRON 29 (L) 09/06/2023    TIBC 350 09/06/2023    LABIRON 8 (L) 09/06/2023      Lab Results   Component Value Date    FERRITIN 7 (L) 09/06/2023         ASSESSMENT AND PLAN:   Encounter Diagnoses    Name Primary?    Iron deficiency anemia due to chronic blood loss Yes    Sickle cell trait     Hashimoto's thyroiditis        Mutlifactorial anemia from sickle trait and GABBY  GABBY from menorrhagia  Recommend discuss menorrhagia mgmt strategies with gynecologist  Intolerant to po iron  Plan to proceed with weekly IV feraheme x 2 dose; discussed small <1% risk of potentially serious and fatal allergic reactions with iv iron preparations and patient expressed understanding and wishes to proceed   Fu with endo/pcp for hashimotos; on synthroid    Follow Up:    -please schedule weekly feraheme infusions x 2  asap  -cbc, ferritin lab through [facility] and virtual md appt 2-3 months after iron infusions  -pt will need [facility] cbc, ferritin lab orders sent ot her over portal      Advance Care Planning     Date: 10/19/2023  GOC/ACP not appropriate for this consultation        Pb Abdi MD  Hematology/Oncology/Bone Marrow Transplant

## 2023-10-23 ENCOUNTER — OFFICE VISIT (OUTPATIENT)
Dept: URGENT CARE | Facility: CLINIC | Age: 43
End: 2023-10-23
Payer: COMMERCIAL

## 2023-10-23 VITALS
BODY MASS INDEX: 41.29 KG/M2 | OXYGEN SATURATION: 97 % | DIASTOLIC BLOOD PRESSURE: 96 MMHG | TEMPERATURE: 99 F | HEIGHT: 63 IN | WEIGHT: 233 LBS | HEART RATE: 82 BPM | RESPIRATION RATE: 20 BRPM | SYSTOLIC BLOOD PRESSURE: 151 MMHG

## 2023-10-23 DIAGNOSIS — K52.9 GASTROENTERITIS: Primary | ICD-10-CM

## 2023-10-23 DIAGNOSIS — R11.0 NAUSEA: ICD-10-CM

## 2023-10-23 LAB
B-HCG UR QL: NEGATIVE
BILIRUB UR QL STRIP: NEGATIVE
CTP QC/QA: YES
GLUCOSE UR QL STRIP: NEGATIVE
KETONES UR QL STRIP: NEGATIVE
LEUKOCYTE ESTERASE UR QL STRIP: NEGATIVE
PH, POC UA: 5.5
POC BLOOD, URINE: NEGATIVE
POC NITRATES, URINE: NEGATIVE
PROT UR QL STRIP: NEGATIVE
SP GR UR STRIP: 1.01 (ref 1–1.03)
UROBILINOGEN UR STRIP-ACNC: NORMAL (ref 0.1–1.1)

## 2023-10-23 PROCEDURE — 99213 PR OFFICE/OUTPT VISIT, EST, LEVL III, 20-29 MIN: ICD-10-PCS | Mod: S$GLB,,,

## 2023-10-23 PROCEDURE — 81025 URINE PREGNANCY TEST: CPT | Mod: S$GLB,,,

## 2023-10-23 PROCEDURE — 81003 URINALYSIS AUTO W/O SCOPE: CPT | Mod: QW,S$GLB,,

## 2023-10-23 PROCEDURE — 81025 POCT URINE PREGNANCY: ICD-10-PCS | Mod: S$GLB,,,

## 2023-10-23 PROCEDURE — 81003 POCT URINALYSIS, DIPSTICK, AUTOMATED, W/O SCOPE: ICD-10-PCS | Mod: QW,S$GLB,,

## 2023-10-23 PROCEDURE — 99213 OFFICE O/P EST LOW 20 MIN: CPT | Mod: S$GLB,,,

## 2023-10-23 RX ORDER — DICYCLOMINE HYDROCHLORIDE 20 MG/1
20 TABLET ORAL EVERY 6 HOURS
Qty: 12 TABLET | Refills: 0 | Status: SHIPPED | OUTPATIENT
Start: 2023-10-23

## 2023-10-23 RX ORDER — ONDANSETRON 8 MG/1
8 TABLET, ORALLY DISINTEGRATING ORAL
Status: COMPLETED | OUTPATIENT
Start: 2023-10-23 | End: 2023-10-23

## 2023-10-23 RX ORDER — ONDANSETRON 4 MG/1
4 TABLET, FILM COATED ORAL EVERY 8 HOURS PRN
Qty: 9 TABLET | Refills: 0 | Status: SHIPPED | OUTPATIENT
Start: 2023-10-23

## 2023-10-23 RX ADMIN — ONDANSETRON 8 MG: 8 TABLET, ORALLY DISINTEGRATING ORAL at 06:10

## 2023-10-23 NOTE — PROGRESS NOTES
"Subjective:      Patient ID: Ana Wharton is a 43 y.o. female.    Vitals:  height is 5' 3" (1.6 m) and weight is 105.7 kg (233 lb). Her oral temperature is 98.5 °F (36.9 °C). Her blood pressure is 151/96 (abnormal) and her pulse is 82. Her respiration is 20 and oxygen saturation is 97%.     Chief Complaint: Nausea    Patient presents with nausea.  She states that her symptoms started yesterday.  Associated symptoms include vomiting.  She states that she has not had any recent antibiotics, hospitalizations, foreign travel, sick contact.  She denies any trauma or injury to the head.  Patient states that she has not taken anything for his symptoms.  She denies fever, chills, chest pain, shortness for breath, diarrhea, abdominal pain, jaundice.    Nausea  This is a new problem. The current episode started yesterday. The problem has been unchanged. Associated symptoms include fatigue, nausea and vomiting. Pertinent negatives include no abdominal pain, anorexia, arthralgias, change in bowel habit, chest pain, chills, congestion, coughing, diaphoresis, fever, headaches, joint swelling, myalgias, neck pain, numbness, rash, sore throat, swollen glands, urinary symptoms, vertigo, visual change or weakness. She has tried nothing for the symptoms. The treatment provided no relief.     Constitution: Positive for appetite change and fatigue. Negative for chills, sweating and fever.   HENT: Negative.  Negative for congestion and sore throat.    Neck: Negative for neck pain.   Cardiovascular:  Negative for chest pain and sob on exertion.   Respiratory:  Negative for cough.    Gastrointestinal:  Positive for nausea and vomiting. Negative for abdominal pain and diarrhea.   Musculoskeletal:  Negative for joint pain, joint swelling and muscle ache.   Skin: Negative.  Negative for rash.   Allergic/Immunologic: Negative.    Neurological:  Negative for history of vertigo, headaches and numbness.   Psychiatric/Behavioral: Negative.      "   Objective:     Physical Exam   Constitutional:  Non-toxic appearance. She does not appear ill. No distress.      Comments:Patient is in no acute distress, patient is non-toxic appearing, patient is ox3, patient is answering question appropriately.   normal  HENT:   Head: Normocephalic.   Ears:   Right Ear: Tympanic membrane, external ear and ear canal normal. impacted cerumen  Left Ear: Tympanic membrane, external ear and ear canal normal. impacted cerumen  Nose: Nose normal. No rhinorrhea or congestion.   Mouth/Throat: Mucous membranes are moist. No oropharyngeal exudate or posterior oropharyngeal erythema. Oropharynx is clear.   Cardiovascular: Normal rate, regular rhythm and normal heart sounds.   No murmur heard.Exam reveals no gallop and no friction rub.   Pulmonary/Chest: Effort normal and breath sounds normal. No stridor. No respiratory distress. She has no wheezes. She has no rhonchi. She has no rales. She exhibits no tenderness.         Comments: Patient is in no respiratory distress. Breath sounds even, unlabored, and clear to auscultation bilaterally. No accessory muscle usage. Patient able to speak in complete sentences with ease.    Abdominal: Bowel sounds are normal. She exhibits no distension and no mass. Soft. There is abdominal tenderness in the periumbilical area and suprapubic area. There is no rebound, no guarding, no tenderness at McBurney's point, negative Rose's sign, no left CVA tenderness, negative Rovsing's sign and no right CVA tenderness. No hernia.      Comments: Red represents area of involvement.     Lymphadenopathy:     She has no cervical adenopathy.   Neurological: She is alert.   Skin: Skin is not diaphoretic.   Nursing note and vitals reviewed.    Results for orders placed or performed in visit on 10/23/23   POCT urine pregnancy   Result Value Ref Range    POC Preg Test, Ur Negative Negative     Acceptable Yes    POCT Urinalysis, Dipstick, Automated, W/O Scope    Result Value Ref Range    POC Blood, Urine Negative Negative    POC Bilirubin, Urine Negative Negative    POC Urobilinogen, Urine normal 0.1 - 1.1    POC Ketones, Urine Negative Negative    POC Protein, Urine Negative Negative    POC Nitrates, Urine Negative Negative    POC Glucose, Urine Negative Negative    pH, UA 5.5     POC Specific Gravity, Urine 1.015 1.003 - 1.029    POC Leukocytes, Urine Negative Negative     Assessment:     1. Gastroenteritis    2. Nausea      Plan:   Previous notes reviewed.  Vital signs reviewed.  Labs ordered. Labs reviewed.  Discussed Gastroenteritis, home care, tx options, and given follow up precautions.  Patient was briefed on my thought process and diagnosis.  Patient involved with the treatment plan and agreed to the plan.    Will treat for gastroenteritis at this time, patient is stable and nontoxic appearing, physical exam shows suprapubic/umbilical tenderness to palpation without guarding/rebound, UA is negative, UPT is negative, will treat symptomatically, there is a low suspicion for acute abdomen/ectopic pregnancies/UTI/pyelonephritis/ovarian torsion, PCP follow up encouraged, ER precautions given    Patient informed on warning signs, patient understood warning signs and to go to urgent care or ER if warning signs appear.  Please excuse grammatical/spelling errors appreciated throughout this visit encounter for a remote dictation device was used during this encounter.    Patient Instructions   Please return here or go to the Emergency Department for any concerns or worsening of condition.    Please take DICYCLOMINE for abdominal cramps.   Please take ZOFRAN for nausea/vomiting.     Please consider Pedialyte and/or Gatorade/Powerade for hydration.  Please avoid dairy, spicy foods, greasy foods for symptom relief.  Please slow reintroduce your diet in the following pattern:   Liquids only, if you are able to tolerate, please move to the next step.   Soft foods only, if you  are able to tolerate, please move to the next step.   [place] food only, if you are able to tolerate, please move to the next step.   Regular diet    Please be advised that you have received urgent care treatment and although an acute abdomen seems less likely, it can not be ruled out completely in this setting. If you start to experience the warning signs that we discussed today, please go to the ER to have further imaging (such as a CT) and additional lab work done.    Please follow up with your primary care doctor or specialist as needed.    If you  smoke, please stop smoking.    Gastroenteritis  -     ondansetron (ZOFRAN) 4 MG tablet; Take 1 tablet (4 mg total) by mouth every 8 (eight) hours as needed for Nausea.  Dispense: 9 tablet; Refill: 0  -     dicyclomine (BENTYL) 20 mg tablet; Take 1 tablet (20 mg total) by mouth every 6 (six) hours.  Dispense: 12 tablet; Refill: 0    Nausea  -     POCT urine pregnancy  -     POCT Urinalysis, Dipstick, Automated, W/O Scope  -     ondansetron disintegrating tablet 8 mg      Kin Morataya PA-C

## 2023-10-23 NOTE — PATIENT INSTRUCTIONS
Please return here or go to the Emergency Department for any concerns or worsening of condition.    Please take DICYCLOMINE for abdominal cramps.   Please take ZOFRAN for nausea/vomiting.     Please consider Pedialyte and/or Gatorade/Powerade for hydration.  Please avoid dairy, spicy foods, greasy foods for symptom relief.  Please slow reintroduce your diet in the following pattern:   Liquids only, if you are able to tolerate, please move to the next step.   Soft foods only, if you are able to tolerate, please move to the next step.   [place] food only, if you are able to tolerate, please move to the next step.   Regular diet    Please be advised that you have received urgent care treatment and although an acute abdomen seems less likely, it can not be ruled out completely in this setting. If you start to experience the warning signs that we discussed today, please go to the ER to have further imaging (such as a CT) and additional lab work done.    Please follow up with your primary care doctor or specialist as needed.    If you  smoke, please stop smoking.

## 2023-10-23 NOTE — LETTER
October 23, 2023      [facility] - Urgent Care  [address] [facility], [address]  [place] [address]  Phone: [phone]  Fax: [phone]       Patient: Ana Wharton   YOB: 1980  Date of Visit: 10/23/2023    To Whom It May Concern:    Cruz Wharton  was at [facility] on 10/23/2023. The patient may return to work/school on 10/25/2023 with no restrictions. If you have any questions or concerns, or if I can be of further assistance, please do not hesitate to contact me.    Sincerely,    Kin Morataya PA-C

## 2023-10-31 ENCOUNTER — PATIENT MESSAGE (OUTPATIENT)
Dept: HEMATOLOGY/ONCOLOGY | Facility: CLINIC | Age: 43
End: 2023-10-31
Payer: COMMERCIAL

## 2023-11-01 ENCOUNTER — HOSPITAL ENCOUNTER (OUTPATIENT)
Dept: RADIOLOGY | Facility: HOSPITAL | Age: 43
Discharge: HOME | End: 2023-11-01
Attending: NURSE PRACTITIONER
Payer: COMMERCIAL

## 2023-11-01 DIAGNOSIS — Z91.89 AT HIGH RISK FOR BREAST CANCER: ICD-10-CM

## 2023-11-01 DIAGNOSIS — Z80.3 FAMILY HISTORY OF BREAST CANCER: ICD-10-CM

## 2023-11-01 DIAGNOSIS — R92.30 DENSE BREAST TISSUE: ICD-10-CM

## 2023-11-01 PROCEDURE — 25500020 PHARM REV CODE 255: Performed by: NURSE PRACTITIONER

## 2023-11-01 PROCEDURE — 77049 MRI BREAST W/WO CONTRAST, W/CAD, BILATERAL: ICD-10-PCS | Mod: 26,,, | Performed by: RADIOLOGY

## 2023-11-01 PROCEDURE — 77049 MRI BREAST C-+ W/CAD BI: CPT | Mod: 26,,, | Performed by: RADIOLOGY

## 2023-11-01 PROCEDURE — 77049 MRI BREAST C-+ W/CAD BI: CPT | Mod: TC

## 2023-11-01 PROCEDURE — A9577 INJ MULTIHANCE: HCPCS | Performed by: NURSE PRACTITIONER

## 2023-11-01 RX ADMIN — GADOBENATE DIMEGLUMINE 20 ML: 529 INJECTION, SOLUTION INTRAVENOUS at 10:11

## 2023-11-07 ENCOUNTER — OFFICE VISIT (OUTPATIENT)
Dept: HEMATOLOGY/ONCOLOGY | Facility: CLINIC | Age: 43
End: 2023-11-07
Payer: COMMERCIAL

## 2023-11-07 VITALS
TEMPERATURE: 99 F | RESPIRATION RATE: 18 BRPM | OXYGEN SATURATION: 100 % | HEART RATE: 70 BPM | DIASTOLIC BLOOD PRESSURE: 81 MMHG | WEIGHT: 237.19 LBS | SYSTOLIC BLOOD PRESSURE: 136 MMHG | BODY MASS INDEX: 42.03 KG/M2 | HEIGHT: 63 IN

## 2023-11-07 DIAGNOSIS — Z80.3 FAMILY HISTORY OF BREAST CANCER: ICD-10-CM

## 2023-11-07 DIAGNOSIS — Z91.89 AT HIGH RISK FOR BREAST CANCER: Primary | ICD-10-CM

## 2023-11-07 PROCEDURE — 99999 PR PBB SHADOW E&M-EST. PATIENT-LVL III: ICD-10-PCS | Mod: PBBFAC,,, | Performed by: NURSE PRACTITIONER

## 2023-11-07 PROCEDURE — 99214 PR OFFICE/OUTPT VISIT, EST, LEVL IV, 30-39 MIN: ICD-10-PCS | Mod: S$GLB,,, | Performed by: NURSE PRACTITIONER

## 2023-11-07 PROCEDURE — 99214 OFFICE O/P EST MOD 30 MIN: CPT | Mod: S$GLB,,, | Performed by: NURSE PRACTITIONER

## 2023-11-07 PROCEDURE — 99999 PR PBB SHADOW E&M-EST. PATIENT-LVL III: CPT | Mod: PBBFAC,,, | Performed by: NURSE PRACTITIONER

## 2023-11-07 NOTE — PROGRESS NOTES
No chief complaint on file.          HPI:   Ana Wharton is a 43 y.o. who presents for follow up of increased risk of breast cancer.      Feels good today and no complaints.   No breast concerns.       2023 MRI breast:   Impression:  No significant masses, areas of abnormal enhancement,  or other abnormal findings are seen.     BI-RADS Category:   Overall: 1 - Negative     Recommendation:  Return to annual screening mammogram schedule is recommended with bilateral mammogram in 2024.      Your estimated lifetime risk of breast cancer (to age 85) based on Tyrer-Cuzick risk assessment model is Tyrer-Cuzick: 20.55 %. According to the American Cancer Society, patients with a lifetime breast cancer risk of 20% or higher might benefit from supplemental screening tests.    2023 MMG:   Impression:   No mammographic evidence of malignancy.     BI-RADS Category 1: Negative     Recommendation:  Routine screening mammogram in 1 year is recommended.     Your estimated lifetime risk of breast cancer (to age 85) based on Tyrer-Cuzick risk assessment model is 20.55 %.  According to the American Cancer Society, patients with a lifetime breast cancer risk of 20% or higher might benefit from supplemental screening tests. ??        High Risk Breast cancer specific history:  - Age: 43 y.o.   - Height:  5'3  - Weight:   Wt Readings from Last 3 Encounters:   23 0808 107.6 kg (237 lb 3.4 oz)   10/23/23 1757 105.7 kg (233 lb)   10/19/23 1551 105.7 kg (233 lb 2.2 oz)      - Breast density per BI-RADS:  b - Scattered fibroglandular density  - Age at menarche:  11 yo  - Number of pregnancies: ;   -Uterus and ovaries intact: Yes  - She is premenopausal. Age at menopause, if applicable:  n/a.   - HRT: No  - Genetic testing: No  - Personal history of cancer: No  - Previous chest radiation exposure between ages 10-30 years old: No  - Personal history of breast biopsy: No  - Ashkenazi Jehovah's witness Inheritance: No  - Family  history of cancer:    Sister- breast cancer dx 48 yo currently 50. No genetic testing  Maternal grandmother- lung cancer    Social History:  Tobacco use:  no  Alcohol use:  no  Employment: works at [employer]    .     Past Medical   Past Medical History:   Diagnosis Date    Hypertension     Miscarriage 2018    Thyroid disease     hyperthyroidism      Patient Active Problem List   Diagnosis    Status post right salpingectomy    Hyperthyroidism    Class 3 severe obesity with body mass index (BMI) of 40.0 to 44.9 in adult    Essential hypertension    Anxiety    Diagnosis deferred    Hashimoto's thyroiditis    Sickle cell trait    Iron deficiency anemia due to chronic blood loss     Social History   Social History     Tobacco Use    Smoking status: Never    Smokeless tobacco: Never   Substance Use Topics    Alcohol use: No     Comment: socially    Drug use: No     Family History  Family History   Problem Relation Age of Onset    Heart disease Mother 47    Diabetes Mother     Kidney disease Mother         on dialysis    Sickle cell anemia Mother     COPD Father     Heart failure Father     Breast cancer Sister 49    Hypertension Sister     Hashimoto's thyroiditis Sister     Scoliosis Sister     Lung cancer Maternal Grandmother         smoker    Sickle cell anemia Maternal Grandfather     Heart failure Paternal Grandfather     Diabetes Brother     Diabetes Brother     Diabetes Brother     Hypertension Brother      Medications    Current Outpatient Medications:     clindamycin phosphate 1% (CLINDAGEL) 1 % gel, Apply topically 2 (two) times daily., Disp: 60 g, Rfl: 0    dicyclomine (BENTYL) 20 mg tablet, Take 1 tablet (20 mg total) by mouth every 6 (six) hours., Disp: 12 tablet, Rfl: 0    ondansetron (ZOFRAN) 4 MG tablet, Take 1 tablet (4 mg total) by mouth every 8 (eight) hours as needed for Nausea., Disp: 9 tablet, Rfl: 0    PRENATAL VITAMIN PLUS LOW IRON 27 mg iron- 1 mg Tab, Take 1 tablet by mouth., Disp: , Rfl:      topiramate (TOPAMAX) 25 MG tablet, Take 1 tablet (25 mg total) by mouth 2 (two) times a day., Disp: 60 tablet, Rfl: 2  Allergies  Review of patient's allergies indicates:   Allergen Reactions    Dilaudid [hydromorphone (bulk)] Hives    Penicillins      hives       Review of Systems       See above   All other systems reviewed and are negative.    Objective:          Physical Exam  Physical Exam  Vitals reviewed.   Constitutional:       General: She is not in acute distress.     Appearance: Normal appearance.   HENT:      Nose: Nose normal.      Mouth/Throat:      Mouth: Mucous membranes are moist.   Eyes:      General: No scleral icterus.     Conjunctiva/sclera: Conjunctivae normal.      Pupils: Pupils are equal, round, and reactive to light.   Cardiovascular:      Rate and Rhythm: Normal rate and regular rhythm.   Pulmonary:      Effort: Pulmonary effort is normal.      Breath sounds: Normal breath sounds. No wheezing.      Comments: No breast masses or LAD  Abdominal:      General: Abdomen is flat. Bowel sounds are normal. There is no distension.      Palpations: Abdomen is soft. There is no mass.      Tenderness: There is no abdominal tenderness.   Musculoskeletal:         General: No swelling. Normal range of motion.      Cervical back: Normal range of motion and neck supple.      Comments: No spinal or paraspinal tenderness   Skin:     General: Skin is warm and dry.   Neurological:      Mental Status: She is alert and oriented to person, place, and time.      Motor: No weakness.   Psychiatric:         Mood and Affect: Mood normal.           Laboratory Data: reviewed most recent   Imaging: reviewed most recent      Assessment:     1. At high risk for breast cancer    2. Family history of breast cancer        Plan:     We discussed continued screening due to high risk status. Her breasts have average density and the MRI was expensive.   Plan for MMG alternating with breast US.   She will alternate CBE here and  with GYN/PCP.  Encouraged breast awareness, including monthly breast self-exams.   Recommend lifestyle modifications as previously discussed.       RTC in 1 year    Route Chart for Scheduling    Med Onc Chart Routing      Follow up with physician    Follow up with [facility] 1 year.   Infusion scheduling note    Injection scheduling note    Labs    Imaging   MMG 4/2024; Breast US 11/2024   Pharmacy appointment    Other referrals                    Supportive Plan Information  OP FERUMOXYTOL   Brayden Abdi MD   Upcoming Treatment Dates - OP FERUMOXYTOL    11/2/2023       Medications       FERUMOXYTOL 510 MG/117 ML D5W (READY TO MIX SYSTEM) IVPB 510 mg  11/9/2023       Medications       FERUMOXYTOL 510 MG/117 ML D5W (READY TO MIX SYSTEM) IVPB 510 mg    Questions were encouraged and answered to patient's satisfaction, and patient verbalized understanding of information and agreement with the plan. Advised patient to RTC with any interval changes or concerns.      Patient is in agreement with the proposed treatment plan. All questions were answered to the patient's satisfaction. Pt knows to call clinic for any new or worsening symptoms and if anything is needed before the next clinic visit.    Albin Emerson, MSN, APRN, FNP-C  Nurse Practitioner to Dr. Harmony Easley  Lead [facility] for High-Risk Breast Clinic  Lead [facility] for Oncology Urgent [facility]  Hematology & Medical Oncology  [address] [place] [id number]  ph. [phone] ext [phone]  Fax. [phone]              30 minutes of total time spent on the encounter, which includes face to face time and non-face to face time preparing to see the patient (eg, review of tests), Obtaining and/or reviewing separately obtained history, Documenting clinical information in the electronic or other health record, Independently interpreting results (not separately reported) and communicating results to the patient/family/caregiver, or Care coordination (not separately reported).

## 2023-11-23 LAB
BASOPHILS # BLD AUTO: 33 CELLS/UL (ref 0–200)
BASOPHILS NFR BLD AUTO: 0.4 %
EOSINOPHIL # BLD AUTO: 149 CELLS/UL (ref 15–500)
EOSINOPHIL NFR BLD AUTO: 1.8 %
ERYTHROCYTE [DISTWIDTH] IN BLOOD BY AUTOMATED COUNT: 13.5 % (ref 11–15)
FERRITIN SERPL-MCNC: 6 NG/ML (ref 16–232)
HCT VFR BLD AUTO: 33.5 % (ref 35–45)
HGB BLD-MCNC: 10.7 G/DL (ref 11.7–15.5)
IRON SATN MFR SERPL: 7 % (CALC) (ref 16–45)
IRON SERPL-MCNC: 24 MCG/DL (ref 40–190)
LYMPHOCYTES # BLD AUTO: 2291 CELLS/UL (ref 850–3900)
LYMPHOCYTES NFR BLD AUTO: 27.6 %
MCH RBC QN AUTO: 28.5 PG (ref 27–33)
MCHC RBC AUTO-ENTMCNC: 31.9 G/DL (ref 32–36)
MCV RBC AUTO: 89.1 FL (ref 80–100)
MONOCYTES # BLD AUTO: 847 CELLS/UL (ref 200–950)
MONOCYTES NFR BLD AUTO: 10.2 %
NEUTROPHILS # BLD AUTO: 4980 CELLS/UL (ref 1500–7800)
NEUTROPHILS NFR BLD AUTO: 60 %
PLATELET # BLD AUTO: 336 THOUSAND/UL (ref 140–400)
PMV BLD REES-ECKER: 10.9 FL (ref 7.5–12.5)
RBC # BLD AUTO: 3.76 MILLION/UL (ref 3.8–5.1)
TIBC SERPL-MCNC: 367 MCG/DL (CALC) (ref 250–450)
WBC # BLD AUTO: 8.3 THOUSAND/UL (ref 3.8–10.8)

## 2024-01-02 ENCOUNTER — OFFICE VISIT (OUTPATIENT)
Dept: FAMILY MEDICINE | Facility: CLINIC | Age: 44
End: 2024-01-02
Payer: COMMERCIAL

## 2024-01-02 DIAGNOSIS — D50.0 IRON DEFICIENCY ANEMIA DUE TO CHRONIC BLOOD LOSS: Primary | ICD-10-CM

## 2024-01-02 DIAGNOSIS — L70.9 ACNE, UNSPECIFIED ACNE TYPE: ICD-10-CM

## 2024-01-02 PROCEDURE — 99214 OFFICE O/P EST MOD 30 MIN: CPT | Mod: 95,,, | Performed by: INTERNAL MEDICINE

## 2024-01-02 RX ORDER — CLINDAMYCIN PHOSPHATE 10 MG/G
GEL TOPICAL 2 TIMES DAILY
Qty: 60 G | Refills: 0 | Status: SHIPPED | OUTPATIENT
Start: 2024-01-02

## 2024-01-02 NOTE — PROGRESS NOTES
10HISTORY OF PRESENT ILLNESS:  Ana Wharton is a 43 y.o. female who presents to the clinic today for follow up.    The patient location is: [place]  The chief complaint leading to consultation is: anxiety    Visit type: audiovisual    Face to Face time with patient: 5 minutes.    10 minutes of total time spent on the encounter, which includes face to face time and non-face to face time preparing to see the patient (eg, review of tests), Obtaining and/or reviewing separately obtained history, Documenting clinical information in the electronic or other health record, Independently interpreting results (not separately reported) and communicating results to the patient/family/caregiver, or Care coordination (not separately reported).         Each patient to whom he or she provides medical services by telemedicine is:  (1) informed of the relationship between the physician and patient and the respective role of any other health care provider with respect to management of the patient; and (2) notified that he or she may decline to receive medical services by telemedicine and may withdraw from such care at any time.    Notes:   Last seen by me 10/2023 by virtual.    Iron deficiency anemia, Sickle cell trait  Seen by hematology.  Recommended for iron infusion and is awaiting insurance approval.  No abnormal bleeding.    High risk for breast cancer  Seen by breast clinic 11/2023.  Recommended for alternating MMG and breast U/S q 6 month.    PAST MEDICAL HISTORY:  Past Medical History:   Diagnosis Date    Hypertension     Miscarriage 2018    Thyroid disease     hyperthyroidism        PAST SURGICAL HISTORY:  Past Surgical History:   Procedure Laterality Date    CHOLECYSTECTOMY  2014    ECTOPIC PREGNANCY SURGERY  2018    GALLBLADDER SURGERY         SOCIAL HISTORY:  Social History     Socioeconomic History    Marital status:    Occupational History    Occupation: [employer]   Tobacco Use    Smoking status: Never     Smokeless tobacco: Never   Substance and Sexual Activity    Alcohol use: No     Comment: socially    Drug use: No    Sexual activity: Yes     Partners: Male     Birth control/protection: None     Social Determinants of Health     Financial Resource Strain: Low Risk  (4/22/2023)    Overall Financial Resource Strain (CARDIA)     Difficulty of Paying Living Expenses: Not hard at all   Food Insecurity: No Food Insecurity (4/22/2023)    Hunger Vital Sign     Worried About Running Out of Food in the Last Year: Never true     Ran Out of Food in the Last Year: Never true   Transportation Needs: No Transportation Needs (4/22/2023)    PRAPARE - Transportation     Lack of Transportation (Medical): No     Lack of Transportation (Non-Medical): No   Physical Activity: Sufficiently Active (4/22/2023)    Exercise Vital Sign     Days of Exercise per Week: 5 days     Minutes of Exercise per Session: 60 min   Stress: No Stress Concern Present (4/22/2023)    [employer] - Occupational Stress Questionnaire     Feeling of Stress : Not at all   Social Connections: Unknown (4/22/2023)    Social Connection and Isolation Panel [NHANES]     Frequency of Communication with Friends and Family: More than three times a week     Frequency of Social Gatherings with Friends and Family: More than three times a week     Active Member of Clubs or Organizations: No     Attends Club or Organization Meetings: Patient declined     Marital Status:    Housing Stability: Low Risk  (4/22/2023)    Housing Stability Vital Sign     Unable to Pay for Housing in the Last Year: No     Number of Places Lived in the Last Year: 2     Unstable Housing in the Last Year: No       FAMILY HISTORY:  Family History   Problem Relation Age of Onset    Heart disease Mother 47    Diabetes Mother     Kidney disease Mother         on dialysis    Sickle cell anemia Mother     COPD Father     Heart failure Father     Breast cancer Sister 49     Hypertension Sister     Hashimoto's thyroiditis Sister     Scoliosis Sister     Lung cancer Maternal Grandmother         smoker    Sickle cell anemia Maternal Grandfather     Heart failure Paternal Grandfather     Diabetes Brother     Diabetes Brother     Diabetes Brother     Hypertension Brother        ALLERGIES AND MEDICATIONS: updated and reviewed.  Review of patient's allergies indicates:   Allergen Reactions    Dilaudid [hydromorphone (bulk)] Hives    Penicillins      hives     Medication List with Changes/Refills   Current Medications    CLINDAMYCIN PHOSPHATE 1% (CLINDAGEL) 1 % GEL    Apply topically 2 (two) times daily.    DICYCLOMINE (BENTYL) 20 MG TABLET    Take 1 tablet (20 mg total) by mouth every 6 (six) hours.    ONDANSETRON (ZOFRAN) 4 MG TABLET    Take 1 tablet (4 mg total) by mouth every 8 (eight) hours as needed for Nausea.    PRENATAL VITAMIN PLUS LOW IRON 27 MG IRON- 1 MG TAB    Take 1 tablet by mouth.    TOPIRAMATE (TOPAMAX) 25 MG TABLET    Take 1 tablet (25 mg total) by mouth 2 (two) times a day.          CARE TEAM:  Patient Care Team:  Chadwick Oakley MD as PCP - General (Internal Medicine)         REVIEW OF SYSTEMS:  Review of Systems   Constitutional:  Negative for activity change and unexpected weight change.   HENT:  Negative for hearing loss, rhinorrhea and trouble swallowing.    Eyes:  Negative for discharge and visual disturbance.   Respiratory:  Negative for chest tightness and wheezing.    Cardiovascular:  Negative for chest pain and palpitations.   Gastrointestinal:  Negative for blood in stool, constipation, diarrhea and vomiting.   Endocrine: Negative for polydipsia and polyuria.   Genitourinary:  Negative for difficulty urinating, dysuria, hematuria and menstrual problem.   Musculoskeletal:  Negative for arthralgias, joint swelling and neck pain.   Neurological:  Negative for weakness and headaches.   Psychiatric/Behavioral:  Negative for confusion and dysphoric mood.           PHYSICAL EXAM:        General appearance - alert, well appearing, and in no distress      ASSESSMENT AND PLAN:  Iron deficiency anemia due to chronic blood loss       - Follow up hematology for infusion.    Acne, unspecified acne type  -     clindamycin phosphate 1% (CLINDAGEL) 1 % gel; Apply topically 2 (two) times daily.  Dispense: 60 g; Refill: 0              Follow up 6 months or sooner as needed.

## 2024-03-20 ENCOUNTER — PATIENT MESSAGE (OUTPATIENT)
Dept: ENDOCRINOLOGY | Facility: CLINIC | Age: 44
End: 2024-03-20
Payer: COMMERCIAL

## 2024-03-20 ENCOUNTER — PATIENT MESSAGE (OUTPATIENT)
Dept: HEMATOLOGY/ONCOLOGY | Facility: CLINIC | Age: 44
End: 2024-03-20
Payer: COMMERCIAL

## 2024-03-20 ENCOUNTER — PATIENT MESSAGE (OUTPATIENT)
Dept: OBSTETRICS AND GYNECOLOGY | Facility: CLINIC | Age: 44
End: 2024-03-20
Payer: COMMERCIAL

## 2024-03-20 RX ORDER — SODIUM CHLORIDE 0.9 % (FLUSH) 0.9 %
10 SYRINGE (ML) INJECTION
OUTPATIENT
Start: 2024-03-27

## 2024-03-20 RX ORDER — EPINEPHRINE 0.3 MG/.3ML
0.3 INJECTION SUBCUTANEOUS ONCE AS NEEDED
OUTPATIENT
Start: 2024-03-27

## 2024-03-20 RX ORDER — DIPHENHYDRAMINE HYDROCHLORIDE 50 MG/ML
50 INJECTION INTRAMUSCULAR; INTRAVENOUS ONCE AS NEEDED
OUTPATIENT
Start: 2024-03-27

## 2024-03-20 RX ORDER — HEPARIN 100 UNIT/ML
500 SYRINGE INTRAVENOUS
OUTPATIENT
Start: 2024-03-27

## 2024-03-22 ENCOUNTER — OFFICE VISIT (OUTPATIENT)
Dept: OBSTETRICS AND GYNECOLOGY | Facility: CLINIC | Age: 44
End: 2024-03-22
Payer: COMMERCIAL

## 2024-03-22 ENCOUNTER — PATIENT MESSAGE (OUTPATIENT)
Dept: ENDOCRINOLOGY | Facility: CLINIC | Age: 44
End: 2024-03-22
Payer: COMMERCIAL

## 2024-03-22 VITALS
DIASTOLIC BLOOD PRESSURE: 80 MMHG | SYSTOLIC BLOOD PRESSURE: 130 MMHG | WEIGHT: 235.56 LBS | BODY MASS INDEX: 41.73 KG/M2

## 2024-03-22 DIAGNOSIS — Z01.419 WELL WOMAN EXAM WITH ROUTINE GYNECOLOGICAL EXAM: Primary | ICD-10-CM

## 2024-03-22 DIAGNOSIS — B37.31 VAGINAL YEAST INFECTION: ICD-10-CM

## 2024-03-22 PROCEDURE — 1159F MED LIST DOCD IN RCRD: CPT | Mod: CPTII,S$GLB,, | Performed by: OBSTETRICS & GYNECOLOGY

## 2024-03-22 PROCEDURE — 1160F RVW MEDS BY RX/DR IN RCRD: CPT | Mod: CPTII,S$GLB,, | Performed by: OBSTETRICS & GYNECOLOGY

## 2024-03-22 PROCEDURE — 3075F SYST BP GE 130 - 139MM HG: CPT | Mod: CPTII,S$GLB,, | Performed by: OBSTETRICS & GYNECOLOGY

## 2024-03-22 PROCEDURE — 99999 PR PBB SHADOW E&M-EST. PATIENT-LVL III: CPT | Mod: PBBFAC,,, | Performed by: OBSTETRICS & GYNECOLOGY

## 2024-03-22 PROCEDURE — 3008F BODY MASS INDEX DOCD: CPT | Mod: CPTII,S$GLB,, | Performed by: OBSTETRICS & GYNECOLOGY

## 2024-03-22 PROCEDURE — 99396 PREV VISIT EST AGE 40-64: CPT | Mod: S$GLB,,, | Performed by: OBSTETRICS & GYNECOLOGY

## 2024-03-22 PROCEDURE — 3079F DIAST BP 80-89 MM HG: CPT | Mod: CPTII,S$GLB,, | Performed by: OBSTETRICS & GYNECOLOGY

## 2024-03-22 PROCEDURE — 99459 PELVIC EXAMINATION: CPT | Mod: S$GLB,,, | Performed by: OBSTETRICS & GYNECOLOGY

## 2024-03-22 RX ORDER — FLUCONAZOLE 150 MG/1
150 TABLET ORAL
Qty: 6 TABLET | Refills: 1 | Status: SHIPPED | OUTPATIENT
Start: 2024-03-22

## 2024-03-22 NOTE — PROGRESS NOTES
[facility]  Ambulatory Clinic  Obstetrics & Gynecology    Visit Date:  3/22/2024    Chief Complaint:  Annual GYN exam    History of Present Illness:      Ana Wharton is a 44 y.o.  here for a gynecologic exam with c/o vaginal yeast infection.      Pt reports taking frequent baths.    Menses are regular, not heavy or painful.    Pt current method of family planning is natural family planning, and reports no problems with this method.      Last pap ~ was benign.    Last mammo ~2023 was benign.    Pt denies abnormal vaginal bleeding, dysmenorrhea, dyspareunia, pelvic pain, bloating, early satiety, unintentional weight loss, breast mass/skin changes, incontinence, GI or urinary complaints.      Otherwise, the pt is in her usual state of health.    Past History:  Gynecologic history as noted above.    Review of Systems:      GENERAL:  No fever, fatigue, excessive weight gain or loss  HEENT:  No headaches, hearing changes, visual disturbance  RESPIRATORY:  No cough, shortness of breath  CARDIOVASCULAR:  No chest pain, heart palpitations, leg swelling  BREAST:  No lump, pain, nipple discharge, skin changes  GASTROINTESTINAL:  No nausea, vomiting, constipation, diarrhea, abd pain, rectal bleeding   GENITOURINARY:  See HPI  ENDOCRINE:  No heat or cold intolerance  HEMATOLOGIC:  No easy bruisability or bleeding   LYMPHATICS:  No enlarged nodes  MUSCULOSKELETAL:  No acute joint pain or swelling  SKIN:  No rash, lesions, jaundice  NEUROLOGIC:  No dizziness, weakness, syncope  PSYCHIATRIC:  No significant mood changes, homicidal/suicidal ideations, abuse    Physical Exam:     /80   Wt 106.8 kg (235 lb 9 oz)   LMP 2024   BMI 41.73 kg/m²   Pulse 60's, Resp rate 12     GENERAL:  No acute distress, well-nourished  HEENT:  Atraumatic, anicteric, moist mucus membranes.  BREAST:  Symmetric, nontender, no obvious masses, adenopathy, skin changes or nipple discharge.  LUNGS:  Clear  normal respiratory effort  HEART:  Regular rate and rhythm, no murmurs, gallops, or rubs  ABDOMEN:  Soft, non-tender, non-distended, normoactive bowel sounds, no obvious organomegaly  EXT:  Symmetric w/o cramping, claudication, or edema. +2 distal pulses.  SKIN:  No rashes or bruising  PSYCH:  Mood and affect appropriate  NEURO:  Grossly intact bilaterally    GENITOURINARY:    VULVAR:  Female external genitalia w/o obvious lesions. Female hair distribution. Normal urethral meatus. No gross lymphadenopathy.    VAGINA:  Normal vaginal mucosa. Good support. No obvious lesion. No discharge.  CERVIX:  No cervical motion tenderness, discharge, or obvious lesions.   UTERUS:  Small, non-tender, normal contour  ADNEXA:  No masses, non-tender    RECTAL:  Deferred. No obvious external lesions    Chaperone present for exam.    Assessment:     44 y.o. :    Well woman gynecologic exam  Vaginal yeast infection    Plan:    A gynecologic health assessment was performed with age appropriate counseling.    Cervical cancer screening - pap up to date.    STI screening - pt declined.      Screening mammogram scheduled.    Diflucan for yeast infection.  If unresolved, use monistat 7.  Hygiene advice.    Encourage healthy lifestyle modifications, monthly self breast exams, and f/u with PCP for health maintenance.    Return 1 year for gynecologic exam or sooner as needed.      All questions answered, pt voiced understanding.        Ramon Dai MD

## 2024-03-26 DIAGNOSIS — D50.0 IRON DEFICIENCY ANEMIA DUE TO CHRONIC BLOOD LOSS: Primary | ICD-10-CM

## 2024-04-15 ENCOUNTER — OFFICE VISIT (OUTPATIENT)
Dept: URGENT CARE | Facility: CLINIC | Age: 44
End: 2024-04-15
Payer: COMMERCIAL

## 2024-04-15 VITALS
DIASTOLIC BLOOD PRESSURE: 100 MMHG | OXYGEN SATURATION: 98 % | SYSTOLIC BLOOD PRESSURE: 144 MMHG | BODY MASS INDEX: 41.64 KG/M2 | HEIGHT: 63 IN | WEIGHT: 235 LBS | TEMPERATURE: 99 F | HEART RATE: 102 BPM | RESPIRATION RATE: 18 BRPM

## 2024-04-15 DIAGNOSIS — H65.91 RIGHT NON-SUPPURATIVE OTITIS MEDIA: Primary | ICD-10-CM

## 2024-04-15 DIAGNOSIS — R05.9 COUGH, UNSPECIFIED TYPE: ICD-10-CM

## 2024-04-15 LAB
CTP QC/QA: YES
CTP QC/QA: YES
POC MOLECULAR INFLUENZA A AGN: NEGATIVE
POC MOLECULAR INFLUENZA B AGN: NEGATIVE
SARS-COV-2 AG RESP QL IA.RAPID: NEGATIVE

## 2024-04-15 PROCEDURE — 99213 OFFICE O/P EST LOW 20 MIN: CPT | Mod: S$GLB,,, | Performed by: NURSE PRACTITIONER

## 2024-04-15 PROCEDURE — 87502 INFLUENZA DNA AMP PROBE: CPT | Mod: QW,S$GLB,, | Performed by: NURSE PRACTITIONER

## 2024-04-15 PROCEDURE — 87811 SARS-COV-2 COVID19 W/OPTIC: CPT | Mod: QW,S$GLB,, | Performed by: NURSE PRACTITIONER

## 2024-04-15 RX ORDER — PROMETHAZINE HYDROCHLORIDE AND DEXTROMETHORPHAN HYDROBROMIDE 6.25; 15 MG/5ML; MG/5ML
5 SYRUP ORAL NIGHTLY PRN
Qty: 120 ML | Refills: 0 | Status: SHIPPED | OUTPATIENT
Start: 2024-04-15 | End: 2024-04-25

## 2024-04-15 RX ORDER — DOXYCYCLINE 100 MG/1
100 CAPSULE ORAL 2 TIMES DAILY
Qty: 14 CAPSULE | Refills: 0 | Status: SHIPPED | OUTPATIENT
Start: 2024-04-15 | End: 2024-04-22

## 2024-04-15 RX ORDER — BENZONATATE 200 MG/1
200 CAPSULE ORAL 3 TIMES DAILY PRN
Qty: 30 CAPSULE | Refills: 0 | Status: SHIPPED | OUTPATIENT
Start: 2024-04-15 | End: 2024-04-25

## 2024-04-15 RX ORDER — FLUTICASONE PROPIONATE 50 MCG
1 SPRAY, SUSPENSION (ML) NASAL DAILY
Qty: 18.2 ML | Refills: 0 | Status: SHIPPED | OUTPATIENT
Start: 2024-04-15 | End: 2024-04-22

## 2024-04-15 NOTE — LETTER
April 15, 2024      [facility]  [address]  [facility] [address]  Phone: [phone]  Fax: [phone]       Patient: Ana Wharton   YOB: 1980  Date of Visit: 04/15/2024    To Whom It May Concern:    Cruz Wharton  was at [facility] on 04/15/2024. The patient may return to work/school on 4/17/2024 with no restrictions. If you have any questions or concerns, or if I can be of further assistance, please do not hesitate to contact me.    Sincerely,    Faith Sanches, NP

## 2024-04-16 NOTE — PROGRESS NOTES
"Subjective:      Patient ID: Ana Wharton is a 44 y.o. female.    Vitals:  height is 5' 3" (1.6 m) and weight is 106.6 kg (235 lb). Her oral temperature is 98.7 °F (37.1 °C). Her blood pressure is 144/100 (abnormal) and her pulse is 102. Her respiration is 18 and oxygen saturation is 98%.     Chief Complaint: Cough    This is a 44 y.o. female who presents today with a chief complaint of  congestion, cough, sore throat, ear pain, started 3 days ago.  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  Her partner was also sick recently.  She is symptoms cough and sinus congestion 3 days.  She is having chills.  Reports right ear pain.  She has been unable to sleep due to the cough.    Cough  This is a new problem. The cough is Non-productive. Associated symptoms include chills, ear pain, a fever, headaches, nasal congestion, postnasal drip, a sore throat and wheezing. Pertinent negatives include no chest pain, ear congestion, heartburn, hemoptysis, myalgias, rash, rhinorrhea, shortness of breath, sweats or weight loss. The symptoms are aggravated by lying down. She has tried nothing for the symptoms. There is no history of asthma, bronchitis, COPD or pneumonia.       Constitution: Positive for chills and fever.   HENT:  Positive for ear pain, postnasal drip and sore throat.    Cardiovascular:  Negative for chest pain.   Respiratory:  Positive for cough and wheezing. Negative for bloody sputum and shortness of breath.    Gastrointestinal:  Negative for heartburn.   Musculoskeletal:  Negative for muscle ache.   Skin:  Negative for rash.   Neurological:  Positive for headaches.      Objective:     Physical Exam   Constitutional: She is oriented to person, place, and time.   HENT:   Head: Normocephalic and atraumatic.   Ears:   Right Ear: Tympanic membrane is erythematous and bulging.   Left Ear: Tympanic membrane is not erythematous and not bulging.   Nose: Rhinorrhea and congestion present. "   Mouth/Throat: No oropharyngeal exudate or posterior oropharyngeal erythema.   Cardiovascular: Tachycardia present.   Pulmonary/Chest: Effort normal and breath sounds normal. No stridor. No respiratory distress. She has no rhonchi. She has no rales. She exhibits no tenderness.   Abdominal: Normal appearance.   Neurological: She is alert and oriented to person, place, and time.   Skin: Skin is warm and dry.   Psychiatric: Her behavior is normal. Mood normal.             Results for orders placed or performed in visit on 04/15/24   SARS Coronavirus 2 Antigen, POCT Manual Read   Result Value Ref Range    SARS Coronavirus 2 Antigen Negative Negative     Acceptable Yes    POCT Influenza A/B MOLECULAR   Result Value Ref Range    POC Molecular Influenza A Ag Negative Negative    POC Molecular Influenza B Ag Negative Negative     Acceptable Yes       Assessment:     1. Right non-suppurative otitis media    2. Cough, unspecified type        Plan:   Flu test negative  Covid test negative            Right non-suppurative otitis media  -     fluticasone propionate (FLONASE) 50 mcg/actuation nasal spray; 1 spray (50 mcg total) by Each Nostril route once daily. for 7 days  Dispense: 18.2 mL; Refill: 0  -     doxycycline (MONODOX) 100 MG capsule; Take 1 capsule (100 mg total) by mouth 2 (two) times daily. for 7 days  Dispense: 14 capsule; Refill: 0    Cough, unspecified type  -     SARS Coronavirus 2 Antigen, POCT Manual Read  -     POCT Influenza A/B MOLECULAR  -     benzonatate (TESSALON) 200 MG capsule; Take 1 capsule (200 mg total) by mouth 3 (three) times daily as needed for Cough.  Dispense: 30 capsule; Refill: 0  -     promethazine-dextromethorphan (PROMETHAZINE-DM) 6.25-15 mg/5 mL Syrp; Take 5 mLs by mouth nightly as needed (cough).  Dispense: 120 mL; Refill: 0

## 2024-04-19 ENCOUNTER — HOSPITAL ENCOUNTER (OUTPATIENT)
Dept: RADIOLOGY | Facility: HOSPITAL | Age: 44
Discharge: HOME | End: 2024-04-19
Attending: NURSE PRACTITIONER
Payer: COMMERCIAL

## 2024-04-19 DIAGNOSIS — Z91.89 AT HIGH RISK FOR BREAST CANCER: ICD-10-CM

## 2024-04-19 DIAGNOSIS — Z80.3 FAMILY HISTORY OF BREAST CANCER: ICD-10-CM

## 2024-04-19 PROCEDURE — 77067 SCR MAMMO BI INCL CAD: CPT | Mod: TC

## 2024-04-19 PROCEDURE — 77063 BREAST TOMOSYNTHESIS BI: CPT | Mod: 26,,, | Performed by: RADIOLOGY

## 2024-04-19 PROCEDURE — 77067 SCR MAMMO BI INCL CAD: CPT | Mod: 26,,, | Performed by: RADIOLOGY

## 2024-05-23 ENCOUNTER — PATIENT MESSAGE (OUTPATIENT)
Dept: FAMILY MEDICINE | Facility: CLINIC | Age: 44
End: 2024-05-23
Payer: COMMERCIAL

## 2024-05-29 ENCOUNTER — TELEPHONE (OUTPATIENT)
Dept: FAMILY MEDICINE | Facility: CLINIC | Age: 44
End: 2024-05-29
Payer: COMMERCIAL

## 2024-05-29 NOTE — TELEPHONE ENCOUNTER
----- Message from Jewell Gracia sent at 5/29/2024  3:35 PM CDT -----  Regarding: self [phone]  Type: Patient Call Back    Who called: self     What is the request in detail: pt would like to know if her FMLA ppw was received she faxed it over earlier.     Can the clinic reply by [facility]? Yes     Would the patient rather a call back or a response via [facility]?  My chart    Best call back number:[phone]

## 2024-07-02 ENCOUNTER — OFFICE VISIT (OUTPATIENT)
Dept: FAMILY MEDICINE | Facility: CLINIC | Age: 44
End: 2024-07-02
Payer: COMMERCIAL

## 2024-07-02 ENCOUNTER — TELEPHONE (OUTPATIENT)
Dept: FAMILY MEDICINE | Facility: CLINIC | Age: 44
End: 2024-07-02

## 2024-07-02 DIAGNOSIS — R60.0 LEG EDEMA: Primary | ICD-10-CM

## 2024-07-02 DIAGNOSIS — Z00.00 HEALTHCARE MAINTENANCE: ICD-10-CM

## 2024-07-02 DIAGNOSIS — D64.9 ANEMIA, UNSPECIFIED TYPE: ICD-10-CM

## 2024-07-02 PROCEDURE — 99213 OFFICE O/P EST LOW 20 MIN: CPT | Mod: 95,,, | Performed by: INTERNAL MEDICINE

## 2024-07-02 RX ORDER — HYDROCHLOROTHIAZIDE 12.5 MG/1
12.5 TABLET ORAL DAILY PRN
Qty: 10 TABLET | Refills: 1 | Status: SHIPPED | OUTPATIENT
Start: 2024-07-02 | End: 2025-07-02

## 2024-07-02 NOTE — TELEPHONE ENCOUNTER
----- Message from Chadwick Oakley MD sent at 7/2/2024  3:44 PM CDT -----  Please call patient to:  1) schedule fasting labs in upcoming month  2) request her to re-send FMLA papers  3) schedule virtual visit AFTER FMLA papers are received.  4) mail or have her  Rx for compression stockings.    Thanks.

## 2024-07-02 NOTE — PROGRESS NOTES
HISTORY OF PRESENT ILLNESS:  Ana Wharton is a 44 y.o. female who presents to the clinic today for leg swelling.    The patient location is: [place]  The chief complaint leading to consultation is: leg swelling    Visit type: audiovisual    Face to Face time with patient: 5 min  10 minutes of total time spent on the encounter, which includes face to face time and non-face to face time preparing to see the patient (eg, review of tests), Obtaining and/or reviewing separately obtained history, Documenting clinical information in the electronic or other health record, Independently interpreting results (not separately reported) and communicating results to the patient/family/caregiver, or Care coordination (not separately reported).         Each patient to whom he or she provides medical services by telemedicine is:  (1) informed of the relationship between the physician and patient and the respective role of any other health care provider with respect to management of the patient; and (2) notified that he or she may decline to receive medical services by telemedicine and may withdraw from such care at any time.    Notes:   She is walking for 30 min walks at the gym.  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  Ongoing for one week.    Plans to resend [employer] papers.      PAST MEDICAL HISTORY:  Past Medical History:   Diagnosis Date    Hypertension     Miscarriage 2018    Thyroid disease     hyperthyroidism        PAST SURGICAL HISTORY:  Past Surgical History:   Procedure Laterality Date    CHOLECYSTECTOMY  2014    ECTOPIC PREGNANCY SURGERY  2018    GALLBLADDER SURGERY         SOCIAL HISTORY:  Social History     Socioeconomic History    Marital status:    Occupational History    Occupation: [employer]   Tobacco Use    Smoking status: Never     Passive exposure: Never    Smokeless tobacco: Never   Substance and Sexual Activity    Alcohol use: No     Comment:  socially    Drug use: No    Sexual activity: Yes     Partners: Male     Birth control/protection: None     Social Determinants of Health     Financial Resource Strain: Low Risk  (1/2/2024)    Overall Financial Resource Strain (CARDIA)     Difficulty of Paying Living Expenses: Not hard at all   Food Insecurity: No Food Insecurity (1/2/2024)    Hunger Vital Sign     Worried About Running Out of Food in the Last Year: Never true     Ran Out of Food in the Last Year: Never true   Transportation Needs: No Transportation Needs (1/2/2024)    PRAPARE - Transportation     Lack of Transportation (Medical): No     Lack of Transportation (Non-Medical): No   Physical Activity: Insufficiently Active (1/2/2024)    Exercise Vital Sign     Days of Exercise per Week: 3 days     Minutes of Exercise per Session: 40 min   Stress: No Stress Concern Present (1/2/2024)    [employer] - Occupational Stress Questionnaire     Feeling of Stress : Not at all   Housing Stability: Low Risk  (1/2/2024)    Housing Stability Vital Sign     Unable to Pay for Housing in the Last Year: No     Number of Places Lived in the Last Year: 2     Unstable Housing in the Last Year: No       FAMILY HISTORY:  Family History   Problem Relation Name Age of Onset    Heart disease Mother  47    Diabetes Mother      Kidney disease Mother          on dialysis    Sickle cell anemia Mother      COPD Father      Heart failure Father      Breast cancer Sister  49    Hypertension Sister      Hashimoto's thyroiditis Sister      Scoliosis Sister      Lung cancer Maternal Grandmother          smoker    Sickle cell anemia Maternal Grandfather      Heart failure Paternal Grandfather      Diabetes Brother      Diabetes Brother      Diabetes Brother      Hypertension Brother         ALLERGIES AND MEDICATIONS: updated and reviewed.  Review of patient's allergies indicates:   Allergen Reactions    Dilaudid [hydromorphone (bulk)] Hives    Penicillins       hives     Medication List with Changes/Refills   Current Medications    CLINDAMYCIN PHOSPHATE 1% (CLINDAGEL) 1 % GEL    Apply topically 2 (two) times daily.    FLUCONAZOLE (DIFLUCAN) 150 MG TAB    Take 1 tablet (150 mg total) by mouth as needed. Take one pill weekly as needed for yeast infection.    PRENATAL VITAMIN PLUS LOW IRON 27 MG IRON- 1 MG TAB    Take 1 tablet by mouth.    TOPIRAMATE (TOPAMAX) 25 MG TABLET    Take 1 tablet (25 mg total) by mouth 2 (two) times a day.          CARE TEAM:  Patient Care Team:  Chadwick Oakley MD as PCP - General (Internal Medicine)         REVIEW OF SYSTEMS:  Review of Systems   Constitutional:  Negative for chills and fever.   HENT:  Negative for congestion and postnasal drip.    Eyes:  Negative for photophobia and visual disturbance.   Respiratory:  Negative for cough and shortness of breath.    Cardiovascular:  Positive for leg swelling. Negative for chest pain and palpitations.   Gastrointestinal:  Negative for nausea and vomiting.   Genitourinary:  Negative for dysuria and frequency.   Musculoskeletal:  Negative for arthralgias and back pain.   Neurological:  Negative for light-headedness and headaches.   Psychiatric/Behavioral:  Negative for dysphoric mood. The patient is not nervous/anxious.          PHYSICAL EXAM:        General appearance - alert, well appearing, and in no distress      ASSESSMENT AND PLAN:  Leg edema  -     COMPRESSION STOCKINGS  -     hydroCHLOROthiazide (HYDRODIURIL) 12.5 MG Tab; Take 1 tablet (12.5 mg total) by mouth daily as needed (leg swelling).  Dispense: 10 tablet; Refill: 1    Healthcare maintenance  -     Hemoglobin A1C; Future; Expected date: 07/02/2024  -     Comprehensive Metabolic Panel; Future; Expected date: 07/02/2024  -     Lipid Panel; Future; Expected date: 07/02/2024  -     CBC Auto Differential; Future; Expected date: 07/02/2024  -     TSH; Future; Expected date: 07/02/2024    Anemia, unspecified type  -     CBC Auto Differential;  Future; Expected date: 07/02/2024  -     Iron and TIBC; Future; Expected date: 07/02/2024  -     Ferritin; Future; Expected date: 07/02/2024              Follow up 3 months or sooner as needed.

## 2024-11-05 DIAGNOSIS — E66.813 CLASS 3 SEVERE OBESITY WITH BODY MASS INDEX (BMI) OF 40.0 TO 44.9 IN ADULT, UNSPECIFIED OBESITY TYPE, UNSPECIFIED WHETHER SERIOUS COMORBIDITY PRESENT: ICD-10-CM

## 2024-11-05 DIAGNOSIS — L70.9 ACNE, UNSPECIFIED ACNE TYPE: ICD-10-CM

## 2024-11-05 DIAGNOSIS — R60.0 LEG EDEMA: ICD-10-CM

## 2024-11-05 DIAGNOSIS — E66.01 CLASS 3 SEVERE OBESITY WITH BODY MASS INDEX (BMI) OF 40.0 TO 44.9 IN ADULT, UNSPECIFIED OBESITY TYPE, UNSPECIFIED WHETHER SERIOUS COMORBIDITY PRESENT: ICD-10-CM

## 2024-11-05 RX ORDER — HYDROCHLOROTHIAZIDE 12.5 MG/1
12.5 TABLET ORAL DAILY PRN
Qty: 30 TABLET | Refills: 0 | Status: SHIPPED | OUTPATIENT
Start: 2024-11-05 | End: 2025-11-05

## 2024-11-05 RX ORDER — TOPIRAMATE 25 MG/1
25 TABLET ORAL 2 TIMES DAILY
Qty: 60 TABLET | Refills: 0 | Status: SHIPPED | OUTPATIENT
Start: 2024-11-05

## 2024-11-05 RX ORDER — CLINDAMYCIN PHOSPHATE 10 MG/G
GEL TOPICAL 2 TIMES DAILY
Qty: 60 G | Refills: 0 | Status: SHIPPED | OUTPATIENT
Start: 2024-11-05

## 2024-11-05 NOTE — TELEPHONE ENCOUNTER
Care Due:                  Date            Visit Type   Department     Provider  --------------------------------------------------------------------------------                                ESTABLISHED   [facility]                              PATIENT -    MEDICINE/  Last Visit: 07-      VIRTUAL      INTERNAL MED   Chadwick GARCIA -         [facility]                              PRIMARY      MEDICINE/  Next Visit: 03-      CARE ([facility])   INTERNAL MED   Chadwick Oakley                                                            Last  Test          Frequency    Reason                     Performed    Due Date  --------------------------------------------------------------------------------    CMP.........  12 months..  hydroCHLOROthiazide......  01- 01-    Health [facility] Embedded Care Due Messages. Reference number: [id number].   11/05/2024 9:51:45 AM CST

## 2024-11-05 NOTE — TELEPHONE ENCOUNTER
Refill Routing Note   Medication(s) are not appropriate for processing by [facility] for the following reason(s):        Required vitals abnormal: HCTZ  Required labs outdated: HCTZ  Outside of protocol: Topamax, Clindagel    [facility] action(s):  Defer  Route   Requires labs : Yes               Appointments  past 12m or future 3m with PCP    Date Provider   Last Visit   7/2/2024 Chadwick Oakley MD   Next Visit   3/18/2025 Chadwick Oakley MD   ED visits in past 90 days: 0        Note composed:2:09 PM 11/05/2024

## 2024-11-07 ENCOUNTER — OFFICE VISIT (OUTPATIENT)
Dept: HEMATOLOGY/ONCOLOGY | Facility: CLINIC | Age: 44
End: 2024-11-07
Payer: MEDICAID

## 2024-11-07 VITALS
OXYGEN SATURATION: 100 % | HEIGHT: 63 IN | TEMPERATURE: 98 F | WEIGHT: 223.13 LBS | DIASTOLIC BLOOD PRESSURE: 88 MMHG | RESPIRATION RATE: 18 BRPM | BODY MASS INDEX: 39.54 KG/M2 | SYSTOLIC BLOOD PRESSURE: 124 MMHG | HEART RATE: 87 BPM

## 2024-11-07 DIAGNOSIS — Z80.3 FAMILY HISTORY OF BREAST CANCER: ICD-10-CM

## 2024-11-07 DIAGNOSIS — Z12.31 ENCOUNTER FOR SCREENING MAMMOGRAM FOR BREAST CANCER: ICD-10-CM

## 2024-11-07 DIAGNOSIS — Z91.89 AT HIGH RISK FOR BREAST CANCER: Primary | ICD-10-CM

## 2024-11-07 DIAGNOSIS — R92.323 SCATTERED FIBROGLANDULAR TISSUE DENSITY OF BOTH BREASTS ON MAMMOGRAPHY: ICD-10-CM

## 2024-11-07 PROCEDURE — G2211 COMPLEX E/M VISIT ADD ON: HCPCS | Mod: S$PBB,,, | Performed by: NURSE PRACTITIONER

## 2024-11-07 PROCEDURE — 3079F DIAST BP 80-89 MM HG: CPT | Mod: CPTII,,, | Performed by: NURSE PRACTITIONER

## 2024-11-07 PROCEDURE — 1159F MED LIST DOCD IN RCRD: CPT | Mod: CPTII,,, | Performed by: NURSE PRACTITIONER

## 2024-11-07 PROCEDURE — 3074F SYST BP LT 130 MM HG: CPT | Mod: CPTII,,, | Performed by: NURSE PRACTITIONER

## 2024-11-07 PROCEDURE — 3008F BODY MASS INDEX DOCD: CPT | Mod: CPTII,,, | Performed by: NURSE PRACTITIONER

## 2024-11-07 PROCEDURE — 99214 OFFICE O/P EST MOD 30 MIN: CPT | Mod: S$PBB,,, | Performed by: NURSE PRACTITIONER

## 2024-11-07 PROCEDURE — 99999 PR PBB SHADOW E&M-EST. PATIENT-LVL IV: CPT | Mod: PBBFAC,,, | Performed by: NURSE PRACTITIONER

## 2024-11-07 PROCEDURE — 99214 OFFICE O/P EST MOD 30 MIN: CPT | Mod: PBBFAC | Performed by: NURSE PRACTITIONER

## 2024-11-07 NOTE — PROGRESS NOTES
Chief Complaint   Patient presents with    At high risk for breast cancer           HPI:   Ana Wharton is a 44 y.o. who presents for follow up of increased risk of breast cancer.      Feels good today and no complaints.   No breast concerns.       2023 MRI breast:   Impression:  No significant masses, areas of abnormal enhancement,  or other abnormal findings are seen.     BI-RADS Category:   Overall: 1 - Negative     Recommendation:  Return to annual screening mammogram schedule is recommended with bilateral mammogram in 2024.      Your estimated lifetime risk of breast cancer (to age 85) based on Tyrer-Cuzick risk assessment model is Tyrer-Cuzick: 20.55 %. According to the American Cancer Society, patients with a lifetime breast cancer risk of 20% or higher might benefit from supplemental screening tests.    2023 MMG:   Impression:   No mammographic evidence of malignancy.     BI-RADS Category 1: Negative     Recommendation:  Routine screening mammogram in 1 year is recommended.     Your estimated lifetime risk of breast cancer (to age 85) based on Tyrer-Cuzick risk assessment model is 20.55 %.  According to the American Cancer Society, patients with a lifetime breast cancer risk of 20% or higher might benefit from supplemental screening tests. ??        High Risk Breast cancer specific history:  - Age: 44 y.o.   - Height:  5'3  - Weight:   Wt Readings from Last 3 Encounters:   24 1111 101.2 kg (223 lb 1.7 oz)   04/15/24 1919 106.6 kg (235 lb)   24 0837 106.8 kg (235 lb 9 oz)      - Breast density per BI-RADS:  b - Scattered fibroglandular density  - Age at menarche:  11 yo  - Number of pregnancies: ;   -Uterus and ovaries intact: Yes  - She is premenopausal. Age at menopause, if applicable:  n/a.   - HRT: No  - Genetic testing: No  - Personal history of cancer: No  - Previous chest radiation exposure between ages 10-30 years old: No  - Personal history of breast biopsy: No  -  Ash[employer]azi [employer]ism Inheritance: No  - Family history of cancer:    Sister- breast cancer dx 50 yo currently 50. No genetic testing  Maternal grandmother- lung cancer    Social History:  Tobacco use:  no  Alcohol use:  no  Employment: works at [employer]    .     Past Medical   Past Medical History:   Diagnosis Date    Hypertension     Miscarriage 2018    Thyroid disease     hyperthyroidism      Patient Active Problem List   Diagnosis    Status post right salpingectomy    Hyperthyroidism    Class 3 severe obesity with body mass index (BMI) of 40.0 to 44.9 in adult    Essential hypertension    Anxiety    Diagnosis deferred    Hashimoto's thyroiditis    Sickle cell trait    Iron deficiency anemia due to chronic blood loss     Social History   Social History     Tobacco Use    Smoking status: Never     Passive exposure: Never    Smokeless tobacco: Never   Substance Use Topics    Alcohol use: No     Comment: socially    Drug use: No     Family History  Family History   Problem Relation Name Age of Onset    Heart disease Mother  47    Diabetes Mother      Kidney disease Mother          on dialysis    Sickle cell anemia Mother      COPD Father      Heart failure Father      Breast cancer Sister  49    Hypertension Sister      Hashimoto's thyroiditis Sister      Scoliosis Sister      Lung cancer Maternal Grandmother          smoker    Sickle cell anemia Maternal Grandfather      Heart failure Paternal Grandfather      Diabetes Brother      Diabetes Brother      Diabetes Brother      Hypertension Brother       Medications    Current Outpatient Medications:     clindamycin phosphate 1% (CLINDAGEL) 1 % gel, Apply topically 2 (two) times daily., Disp: 60 g, Rfl: 0    fluconazole (DIFLUCAN) 150 MG Tab, Take 1 tablet (150 mg total) by mouth as needed. Take one pill weekly as needed for yeast infection., Disp: 6 tablet, Rfl: 1    hydroCHLOROthiazide (HYDRODIURIL) 12.5 MG Tab, Take 1 tablet (12.5 mg total) by mouth daily as needed (leg  swelling)., Disp: 30 tablet, Rfl: 0    PRENATAL VITAMIN PLUS LOW IRON 27 mg iron- 1 mg Tab, Take 1 tablet by mouth., Disp: , Rfl:     topiramate (TOPAMAX) 25 MG tablet, Take 1 tablet (25 mg total) by mouth 2 (two) times a day., Disp: 60 tablet, Rfl: 0  Allergies  Review of patient's allergies indicates:   Allergen Reactions    Dilaudid [hydromorphone (bulk)] Hives    Penicillins      hives       Review of Systems       See above   All other systems reviewed and are negative.    Objective:          Physical Exam  Physical Exam  Vitals reviewed.   Constitutional:       General: She is not in acute distress.     Appearance: Normal appearance.   HENT:      Nose: Nose normal.      Mouth/Throat:      Mouth: Mucous membranes are moist.   Eyes:      General: No scleral icterus.     Conjunctiva/sclera: Conjunctivae normal.      Pupils: Pupils are equal, round, and reactive to light.   Cardiovascular:      Rate and Rhythm: Normal rate and regular rhythm.   Pulmonary:      Effort: Pulmonary effort is normal.      Breath sounds: Normal breath sounds. No wheezing.      Comments: No breast masses or LAD  Abdominal:      General: Abdomen is flat. Bowel sounds are normal. There is no distension.      Palpations: Abdomen is soft. There is no mass.      Tenderness: There is no abdominal tenderness.   Musculoskeletal:         General: No swelling. Normal range of motion.      Cervical back: Normal range of motion and neck supple.      Comments: No spinal or paraspinal tenderness   Skin:     General: Skin is warm and dry.   Neurological:      Mental Status: She is alert and oriented to person, place, and time.      Motor: No weakness.   Psychiatric:         Mood and Affect: Mood normal.           Laboratory Data: reviewed most recent   Imaging: reviewed most recent      Assessment:     1. At high risk for breast cancer    2. Family history of breast cancer    3. Encounter for screening mammogram for breast cancer    4. Scattered  fibroglandular tissue density of both breasts on mammography          Plan:     We discussed continued screening due to high risk status. Her breasts have average density and the MRI was expensive.   For this year's high risk screening, breast US was scheduled. (However, breast US was cancelled by radiologist). I will message to get a better understanding.  Annual KELSIE starting 4/2025  She will alternate CBE here and with GYN/PCP.  Encouraged breast awareness, including monthly breast self-exams.   Recommend lifestyle modifications as previously discussed.       RTC in 1 year    Route Chart for Scheduling    Med Onc Chart Routing      Follow up with physician    Follow up with [facility] 1 year.   Infusion scheduling note    Injection scheduling note    Labs    Imaging    Pharmacy appointment    Other referrals                    Supportive Plan Information  OP IRON DEXTRAN Brayden Abdi MD   Associated Diagnosis: Iron deficiency anemia due to chronic blood loss   noted on 10/19/2023   Line of treatment: Supportive Care   Treatment goal: Supportive     Upcoming Treatment Dates - OP IRON DEXTRAN    3/27/2024       Medications       iron dextran (INFED) 25 mg in sodium chloride 0.9% 100 mL IVPB       iron dextran (INFED) 975 mg in sodium chloride 0.9% 500 mL IVPB    Therapy Plan Information  IRON DEXTRAN (DEXFERRUM) IV for Iron deficiency anemia due to chronic blood loss, noted on 10/19/2023  Pre-Medications  acetaminophen tablet 975 mg  1,000 mg, Oral, Once  famotidine (PF) 20 mg in sodium chloride 0.9% 50 mL IVPB  20 mg, Intravenous, Once  diphenhydrAMINE (BENADRYL) 50 mg in sodium chloride 0.9% 50 mL IVPB  50 mg, Intravenous, Once  Medications  iron dextran (INFED) 25 mg in sodium chloride 0.9% 100 mL IVPB  25 mg, Intravenous, Once  iron dextran (INFED) 75 mg in sodium chloride 0.9% 500 mL IVPB  75 mg, Intravenous, Once  Anaphylaxis/Hypersensitivity  EPINEPHrine (EPIPEN) 0.3 mg/0.3 mL pen injection 0.3 mg  0.3 mg,  Intramuscular, PRN  diphenhydrAMINE injection 50 mg  50 mg, Intravenous, PRN  hydrocortisone sodium succinate injection 100 mg  100 mg, Intravenous, PRN  Flushes  sodium chloride 0.9% injection 10 mL  10 mL, Intravenous, Once  heparin, porcine (PF) 100 unit/mL injection flush 500 Units  500 Units, Intravenous, PRN  sodium chloride 0.9% 100 mL flush bag  Intravenous, Once      No therapy plan of the specified type found.    No therapy plan of the specified type found.    Questions were encouraged and answered to patient's satisfaction, and patient verbalized understanding of information and agreement with the plan. Advised patient to RTC with any interval changes or concerns.      Patient is in agreement with the proposed treatment plan. All questions were answered to the patient's satisfaction. Pt knows to call clinic for any new or worsening symptoms and if anything is needed before the next clinic visit.    Albin Emerson, MSN, APRN, FNP-C  Nurse Practitioner to Dr. Harmony Easley  Lead [facility] for [facility]  Lead [facility]  Hematology & Medical Oncology  [address] [place] [id number]  ph. [phone] ext [phone]  Fax. [phone]              30 minutes of total time spent on the encounter, which includes face to face time and non-face to face time preparing to see the patient (eg, review of tests), Obtaining and/or reviewing separately obtained history, Documenting clinical information in the electronic or other health record, Independently interpreting results (not separately reported) and communicating results to the patient/family/caregiver, or Care coordination (not separately reported).

## 2024-11-08 ENCOUNTER — LAB VISIT (OUTPATIENT)
Dept: LAB | Facility: HOSPITAL | Age: 44
End: 2024-11-08
Attending: INTERNAL MEDICINE
Payer: MEDICAID

## 2024-11-08 DIAGNOSIS — Z00.00 HEALTHCARE MAINTENANCE: ICD-10-CM

## 2024-11-08 DIAGNOSIS — D64.9 ANEMIA, UNSPECIFIED TYPE: ICD-10-CM

## 2024-11-08 LAB
ALBUMIN SERPL BCP-MCNC: 3.6 G/DL (ref 3.5–5.2)
ALP SERPL-CCNC: 63 U/L (ref 40–150)
ALT SERPL W/O P-5'-P-CCNC: 12 U/L (ref 10–44)
ANION GAP SERPL CALC-SCNC: 9 MMOL/L (ref 8–16)
AST SERPL-CCNC: 14 U/L (ref 10–40)
BASOPHILS # BLD AUTO: 0.04 K/UL (ref 0–0.2)
BASOPHILS NFR BLD: 0.4 % (ref 0–1.9)
BILIRUB SERPL-MCNC: 0.4 MG/DL (ref 0.1–1)
BUN SERPL-MCNC: 10 MG/DL (ref 6–20)
CALCIUM SERPL-MCNC: 9.1 MG/DL (ref 8.7–10.5)
CHLORIDE SERPL-SCNC: 106 MMOL/L (ref 95–110)
CHOLEST SERPL-MCNC: 129 MG/DL (ref 120–199)
CHOLEST/HDLC SERPL: 3.7 {RATIO} (ref 2–5)
CO2 SERPL-SCNC: 23 MMOL/L (ref 23–29)
CREAT SERPL-MCNC: 1.1 MG/DL (ref 0.5–1.4)
DIFFERENTIAL METHOD BLD: ABNORMAL
EOSINOPHIL # BLD AUTO: 0.1 K/UL (ref 0–0.5)
EOSINOPHIL NFR BLD: 1.3 % (ref 0–8)
ERYTHROCYTE [DISTWIDTH] IN BLOOD BY AUTOMATED COUNT: 16.8 % (ref 11.5–14.5)
EST. GFR  (NO RACE VARIABLE): >60 ML/MIN/1.73 M^2
FERRITIN SERPL-MCNC: 7 NG/ML (ref 20–300)
GLUCOSE SERPL-MCNC: 99 MG/DL (ref 70–110)
HCT VFR BLD AUTO: 33.2 % (ref 37–48.5)
HDLC SERPL-MCNC: 35 MG/DL (ref 40–75)
HDLC SERPL: 27.1 % (ref 20–50)
HGB BLD-MCNC: 10.9 G/DL (ref 12–16)
IMM GRANULOCYTES # BLD AUTO: 0.03 K/UL (ref 0–0.04)
IMM GRANULOCYTES NFR BLD AUTO: 0.3 % (ref 0–0.5)
IRON SERPL-MCNC: 20 UG/DL (ref 30–160)
LDLC SERPL CALC-MCNC: 75.6 MG/DL (ref 63–159)
LYMPHOCYTES # BLD AUTO: 2.6 K/UL (ref 1–4.8)
LYMPHOCYTES NFR BLD: 27.4 % (ref 18–48)
MCH RBC QN AUTO: 24.7 PG (ref 27–31)
MCHC RBC AUTO-ENTMCNC: 32.8 G/DL (ref 32–36)
MCV RBC AUTO: 75 FL (ref 82–98)
MONOCYTES # BLD AUTO: 1.1 K/UL (ref 0.3–1)
MONOCYTES NFR BLD: 11.4 % (ref 4–15)
NEUTROPHILS # BLD AUTO: 5.6 K/UL (ref 1.8–7.7)
NEUTROPHILS NFR BLD: 59.2 % (ref 38–73)
NONHDLC SERPL-MCNC: 94 MG/DL
NRBC BLD-RTO: 0 /100 WBC
PLATELET # BLD AUTO: 409 K/UL (ref 150–450)
PMV BLD AUTO: 10.2 FL (ref 9.2–12.9)
POTASSIUM SERPL-SCNC: 4.2 MMOL/L (ref 3.5–5.1)
PROT SERPL-MCNC: 7.9 G/DL (ref 6–8.4)
RBC # BLD AUTO: 4.41 M/UL (ref 4–5.4)
SATURATED IRON: 4 % (ref 20–50)
SODIUM SERPL-SCNC: 138 MMOL/L (ref 136–145)
TOTAL IRON BINDING CAPACITY: 493 UG/DL (ref 250–450)
TRANSFERRIN SERPL-MCNC: 333 MG/DL (ref 200–375)
TRIGL SERPL-MCNC: 92 MG/DL (ref 30–150)
TSH SERPL DL<=0.005 MIU/L-ACNC: 1.2 UIU/ML (ref 0.4–4)
WBC # BLD AUTO: 9.51 K/UL (ref 3.9–12.7)

## 2024-11-08 PROCEDURE — 85025 COMPLETE CBC W/AUTO DIFF WBC: CPT | Performed by: INTERNAL MEDICINE

## 2024-11-08 PROCEDURE — 84443 ASSAY THYROID STIM HORMONE: CPT | Performed by: INTERNAL MEDICINE

## 2024-11-08 PROCEDURE — 83036 HEMOGLOBIN GLYCOSYLATED A1C: CPT | Performed by: INTERNAL MEDICINE

## 2024-11-08 PROCEDURE — 36415 COLL VENOUS BLD VENIPUNCTURE: CPT | Mod: PN | Performed by: INTERNAL MEDICINE

## 2024-11-08 PROCEDURE — 84466 ASSAY OF TRANSFERRIN: CPT | Performed by: INTERNAL MEDICINE

## 2024-11-08 PROCEDURE — 80061 LIPID PANEL: CPT | Performed by: INTERNAL MEDICINE

## 2024-11-08 PROCEDURE — 82728 ASSAY OF FERRITIN: CPT | Performed by: INTERNAL MEDICINE

## 2024-11-08 PROCEDURE — 80053 COMPREHEN METABOLIC PANEL: CPT | Performed by: INTERNAL MEDICINE

## 2024-11-09 LAB
ESTIMATED AVG GLUCOSE: 100 MG/DL (ref 68–131)
HBA1C MFR BLD: 5.1 % (ref 4–5.6)

## 2024-11-11 ENCOUNTER — TELEPHONE (OUTPATIENT)
Dept: HEMATOLOGY/ONCOLOGY | Facility: CLINIC | Age: 44
End: 2024-11-11
Payer: MEDICAID

## 2024-11-11 ENCOUNTER — OFFICE VISIT (OUTPATIENT)
Dept: FAMILY MEDICINE | Facility: CLINIC | Age: 44
End: 2024-11-11
Payer: MEDICAID

## 2024-11-11 VITALS
HEIGHT: 63 IN | DIASTOLIC BLOOD PRESSURE: 88 MMHG | BODY MASS INDEX: 39.72 KG/M2 | SYSTOLIC BLOOD PRESSURE: 132 MMHG | WEIGHT: 224.19 LBS | HEART RATE: 102 BPM | RESPIRATION RATE: 18 BRPM | OXYGEN SATURATION: 98 % | TEMPERATURE: 99 F

## 2024-11-11 DIAGNOSIS — D57.3 SICKLE CELL TRAIT: ICD-10-CM

## 2024-11-11 DIAGNOSIS — I10 ESSENTIAL HYPERTENSION: Primary | ICD-10-CM

## 2024-11-11 DIAGNOSIS — Z80.3 FAMILY HISTORY OF BREAST CANCER: ICD-10-CM

## 2024-11-11 DIAGNOSIS — E06.3 HASHIMOTO'S THYROIDITIS: ICD-10-CM

## 2024-11-11 DIAGNOSIS — D50.0 IRON DEFICIENCY ANEMIA DUE TO CHRONIC BLOOD LOSS: ICD-10-CM

## 2024-11-11 DIAGNOSIS — R06.00 DYSPNEA, UNSPECIFIED TYPE: ICD-10-CM

## 2024-11-11 DIAGNOSIS — Z91.89 AT HIGH RISK FOR BREAST CANCER: Primary | ICD-10-CM

## 2024-11-11 DIAGNOSIS — R92.323 SCATTERED FIBROGLANDULAR TISSUE DENSITY OF BOTH BREASTS ON MAMMOGRAPHY: ICD-10-CM

## 2024-11-11 PROCEDURE — 99214 OFFICE O/P EST MOD 30 MIN: CPT | Mod: PBBFAC,PN | Performed by: NURSE PRACTITIONER

## 2024-11-11 PROCEDURE — 3008F BODY MASS INDEX DOCD: CPT | Mod: CPTII,,, | Performed by: NURSE PRACTITIONER

## 2024-11-11 PROCEDURE — 3075F SYST BP GE 130 - 139MM HG: CPT | Mod: CPTII,,, | Performed by: NURSE PRACTITIONER

## 2024-11-11 PROCEDURE — 99214 OFFICE O/P EST MOD 30 MIN: CPT | Mod: S$PBB,,, | Performed by: NURSE PRACTITIONER

## 2024-11-11 PROCEDURE — 1160F RVW MEDS BY RX/DR IN RCRD: CPT | Mod: CPTII,,, | Performed by: NURSE PRACTITIONER

## 2024-11-11 PROCEDURE — 3079F DIAST BP 80-89 MM HG: CPT | Mod: CPTII,,, | Performed by: NURSE PRACTITIONER

## 2024-11-11 PROCEDURE — 99999 PR PBB SHADOW E&M-EST. PATIENT-LVL IV: CPT | Mod: PBBFAC,,, | Performed by: NURSE PRACTITIONER

## 2024-11-11 PROCEDURE — 3044F HG A1C LEVEL LT 7.0%: CPT | Mod: CPTII,,, | Performed by: NURSE PRACTITIONER

## 2024-11-11 PROCEDURE — 1159F MED LIST DOCD IN RCRD: CPT | Mod: CPTII,,, | Performed by: NURSE PRACTITIONER

## 2024-11-11 NOTE — TELEPHONE ENCOUNTER
Latonia De Los Santos MD Alker, Peggy-Jo J., NP [facility]-    I believe when I spoke with her she indicated that she was planning to hiave her high risk screening MRI this year.  I did not realize the cost barrier.   I think it would be reasonable to do the screening u/s if she is not planning the MRI.  Sorry for any confusion if's possible that I misunderstood something in my conversation with Ms. Wharton.    Latonia          Previous Messages       ----- Message -----  From: Albin Emerson NP  Sent: 11/7/2024   1:43 PM CST  To: MD Dilip Hercules! This is a high risk patient I see. She was on high risk screening protocol with MMG and MRI breast. The MRI breast was too expensive and she opted to get breast US for this years high risk screening. I plan to start KELSIE in April.  She told me you cancelled her breast US. Just wondering if I'm missing the reason why?  Thanks and hope you are doing well!    DRAKE

## 2024-11-11 NOTE — PROGRESS NOTES
Routine Office Visit    Patient Name: Ana Wharton    : 1980  MRN: [id number]    Chief Complaint:  BP follow-up    Subjective:  Ana is a 44 y.o. female who presents today for:    BP follow-up - patient who is new to me with Hashimoto's thyroiditis, hypertension on p.r.n. hydrochlorothiazide, iron-deficiency anemia reports today for evaluation.  She had an elevated blood pressure and was recommended to come for a recheck this today.  She feels in her usual state of health but reports sporadic dyspnea at exertion and at rest which she relates to her iron-deficiency.  She denies any associated chest pain or palpitations or orthopnea.  She states sometimes she will just be sitting down and feels a shortness of breath like a wave over her.  She did see hematology and an iron injection was ordered but she states that it was not covered by insurance.  No other acute problems or concerns today.    Past Medical History  Past Medical History:   Diagnosis Date    Hypertension     Miscarriage     Thyroid disease     hyperthyroidism        Family History  Family History   Problem Relation Name Age of Onset    Heart disease Mother  47    Diabetes Mother      Kidney disease Mother          on dialysis    Sickle cell anemia Mother      COPD Father      Heart failure Father      Breast cancer Sister  49    Hypertension Sister      Hashimoto's thyroiditis Sister      Scoliosis Sister      Lung cancer Maternal Grandmother          smoker    Sickle cell anemia Maternal Grandfather      Heart failure Paternal Grandfather      Diabetes Brother      Diabetes Brother      Diabetes Brother      Hypertension Brother         Current Medications  Current Outpatient Medications on File Prior to Visit   Medication Sig Dispense Refill    clindamycin phosphate 1% (CLINDAGEL) 1 % gel Apply topically 2 (two) times daily. 60 g 0    fluconazole (DIFLUCAN) 150 MG Tab Take 1 tablet (150 mg total) by mouth as needed. Take one pill weekly  "as needed for yeast infection. 6 tablet 1    hydroCHLOROthiazide (HYDRODIURIL) 12.5 MG Tab Take 1 tablet (12.5 mg total) by mouth daily as needed (leg swelling). 30 tablet 0    PRENATAL VITAMIN PLUS LOW IRON 27 mg iron- 1 mg Tab Take 1 tablet by mouth.      topiramate (TOPAMAX) 25 MG tablet Take 1 tablet (25 mg total) by mouth 2 (two) times a day. 60 tablet 0     No current facility-administered medications on file prior to visit.       Allergies   Review of patient's allergies indicates:   Allergen Reactions    Dilaudid [hydromorphone (bulk)] Hives    Penicillins      hives       Review of Systems (Pertinent positives)  Review of Systems   Constitutional:  Negative for chills and fever.   HENT: Negative.     Eyes: Negative.  Negative for blurred vision, double vision and photophobia.   Respiratory:  Positive for shortness of breath. Negative for cough, hemoptysis, sputum production and wheezing.    Cardiovascular:  Negative for chest pain, palpitations and orthopnea.   Gastrointestinal: Negative.    Genitourinary: Negative.    Musculoskeletal: Negative.    Skin: Negative.        /88 (BP Location: Right arm)   Pulse 102   Temp 98.5 °F (36.9 °C) (Oral)   Resp 18   Ht 5' 3" (1.6 m)   Wt 101.7 kg (224 lb 3.3 oz)   LMP 10/25/2024   SpO2 98%   BMI 39.72 kg/m²     Physical Exam  Vitals reviewed.   Constitutional:       General: She is not in acute distress.     Appearance: Normal appearance. She is not ill-appearing, toxic-appearing or diaphoretic.   HENT:      Head: Normocephalic and atraumatic.   Cardiovascular:      Rate and Rhythm: Normal rate and regular rhythm.      Pulses: Normal pulses.      Heart sounds: Normal heart sounds.   Pulmonary:      Effort: Pulmonary effort is normal. No respiratory distress.      Breath sounds: Normal breath sounds. No wheezing.   Abdominal:      General: Bowel sounds are normal. There is no distension.      Palpations: Abdomen is soft.      Tenderness: There is no " abdominal tenderness.   Musculoskeletal:         General: No swelling, tenderness or deformity. Normal range of motion.   Skin:     General: Skin is warm and dry.      Capillary Refill: Capillary refill takes less than 2 seconds.   Neurological:      General: No focal deficit present.      Mental Status: She is alert and oriented to person, place, and time.   Psychiatric:         Mood and Affect: Mood normal.         Behavior: Behavior normal.            Assessment/Plan:  Ana Wharton is a 44 y.o. female who presents today for :    Ana was seen today for follow-up.    Diagnoses and all orders for this visit:    Essential hypertension    Controlled on p.r.n. hydrochlorothiazide.  Continue.    Sickle cell trait  -     Ambulatory referral/consult to Hematology / Oncology; Future    Recommended follow up with Hematology given persistently low iron.  Order placed.    Iron deficiency anemia due to chronic blood loss  -     Ambulatory referral/consult to Hematology / Oncology; Future    As above.    Hashimoto's thyroiditis    Recent TSH within normal limits.  Will be seeing endocrinology next year for follow-up.    Dyspnea, unspecified type    Patient relates this to her iron-deficiency.  We discussed potential other causes of dyspnea including cardiac/respiratory issues.  Discussed cardiac workup including EKG, echo, stress test but patient would like to defer at this time.    All questions answered.  Follow up as scheduled or sooner p.r.n..        This office note has been dictated.  This dictation has been generated using M-Modal Fluency Direct dictation; some phonetic errors may occur.

## 2024-11-12 ENCOUNTER — HOSPITAL ENCOUNTER (OUTPATIENT)
Dept: RADIOLOGY | Facility: HOSPITAL | Age: 44
Discharge: HOME | End: 2024-11-12
Attending: NURSE PRACTITIONER
Payer: MEDICAID

## 2024-11-12 DIAGNOSIS — Z80.3 FAMILY HISTORY OF BREAST CANCER: ICD-10-CM

## 2024-11-12 DIAGNOSIS — Z91.89 AT HIGH RISK FOR BREAST CANCER: ICD-10-CM

## 2024-11-12 DIAGNOSIS — R92.323 SCATTERED FIBROGLANDULAR TISSUE DENSITY OF BOTH BREASTS ON MAMMOGRAPHY: ICD-10-CM

## 2024-11-12 PROCEDURE — 76641 ULTRASOUND BREAST COMPLETE: CPT | Mod: TC,50

## 2024-11-12 PROCEDURE — 76641 ULTRASOUND BREAST COMPLETE: CPT | Mod: 26,50,, | Performed by: RADIOLOGY

## 2025-02-07 ENCOUNTER — TELEPHONE (OUTPATIENT)
Dept: RADIOLOGY | Facility: HOSPITAL | Age: 45
End: 2025-02-07
Payer: MEDICAID

## 2025-02-07 NOTE — TELEPHONE ENCOUNTER
[facility]. Patient was instructed on fasting for the contrast mammogram, nothing to eat or drink 2 hours prior to the contrast mammogram.

## 2025-03-18 ENCOUNTER — LAB VISIT (OUTPATIENT)
Dept: LAB | Facility: HOSPITAL | Age: 45
End: 2025-03-18
Attending: INTERNAL MEDICINE
Payer: MEDICAID

## 2025-03-18 ENCOUNTER — OFFICE VISIT (OUTPATIENT)
Dept: FAMILY MEDICINE | Facility: CLINIC | Age: 45
End: 2025-03-18
Payer: MEDICAID

## 2025-03-18 VITALS
OXYGEN SATURATION: 99 % | HEIGHT: 63 IN | BODY MASS INDEX: 41.53 KG/M2 | SYSTOLIC BLOOD PRESSURE: 152 MMHG | HEART RATE: 88 BPM | WEIGHT: 234.38 LBS | TEMPERATURE: 98 F | DIASTOLIC BLOOD PRESSURE: 96 MMHG

## 2025-03-18 DIAGNOSIS — D64.9 ANEMIA, UNSPECIFIED TYPE: ICD-10-CM

## 2025-03-18 DIAGNOSIS — I10 ESSENTIAL HYPERTENSION: ICD-10-CM

## 2025-03-18 DIAGNOSIS — Z00.00 HEALTHCARE MAINTENANCE: Primary | ICD-10-CM

## 2025-03-18 DIAGNOSIS — R03.0 ELEVATED BLOOD PRESSURE READING: ICD-10-CM

## 2025-03-18 DIAGNOSIS — E66.813 CLASS 3 SEVERE OBESITY DUE TO EXCESS CALORIES WITH SERIOUS COMORBIDITY AND BODY MASS INDEX (BMI) OF 40.0 TO 44.9 IN ADULT: ICD-10-CM

## 2025-03-18 DIAGNOSIS — D57.3 SICKLE CELL TRAIT: ICD-10-CM

## 2025-03-18 DIAGNOSIS — E66.01 CLASS 3 SEVERE OBESITY DUE TO EXCESS CALORIES WITH SERIOUS COMORBIDITY AND BODY MASS INDEX (BMI) OF 40.0 TO 44.9 IN ADULT: ICD-10-CM

## 2025-03-18 LAB
BASOPHILS # BLD AUTO: 0.04 K/UL (ref 0–0.2)
BASOPHILS NFR BLD: 0.4 % (ref 0–1.9)
DIFFERENTIAL METHOD BLD: ABNORMAL
EOSINOPHIL # BLD AUTO: 0.1 K/UL (ref 0–0.5)
EOSINOPHIL NFR BLD: 1.3 % (ref 0–8)
ERYTHROCYTE [DISTWIDTH] IN BLOOD BY AUTOMATED COUNT: 19.5 % (ref 11.5–14.5)
FERRITIN SERPL-MCNC: 6 NG/ML (ref 20–300)
HCT VFR BLD AUTO: 28.6 % (ref 37–48.5)
HGB BLD-MCNC: 8.9 G/DL (ref 12–16)
IMM GRANULOCYTES # BLD AUTO: 0.03 K/UL (ref 0–0.04)
IMM GRANULOCYTES NFR BLD AUTO: 0.3 % (ref 0–0.5)
IRON SERPL-MCNC: 25 UG/DL (ref 30–160)
LYMPHOCYTES # BLD AUTO: 2.7 K/UL (ref 1–4.8)
LYMPHOCYTES NFR BLD: 28.8 % (ref 18–48)
MCH RBC QN AUTO: 23.1 PG (ref 27–31)
MCHC RBC AUTO-ENTMCNC: 31.1 G/DL (ref 32–36)
MCV RBC AUTO: 74 FL (ref 82–98)
MONOCYTES # BLD AUTO: 1.2 K/UL (ref 0.3–1)
MONOCYTES NFR BLD: 12.4 % (ref 4–15)
NEUTROPHILS # BLD AUTO: 5.4 K/UL (ref 1.8–7.7)
NEUTROPHILS NFR BLD: 56.8 % (ref 38–73)
NRBC BLD-RTO: 0 /100 WBC
PLATELET # BLD AUTO: 382 K/UL (ref 150–450)
PMV BLD AUTO: 10.3 FL (ref 9.2–12.9)
RBC # BLD AUTO: 3.86 M/UL (ref 4–5.4)
SATURATED IRON: 5 % (ref 20–50)
TOTAL IRON BINDING CAPACITY: 459 UG/DL (ref 250–450)
TRANSFERRIN SERPL-MCNC: 310 MG/DL (ref 200–375)
WBC # BLD AUTO: 9.53 K/UL (ref 3.9–12.7)

## 2025-03-18 PROCEDURE — 99215 OFFICE O/P EST HI 40 MIN: CPT | Mod: PBBFAC,PN | Performed by: INTERNAL MEDICINE

## 2025-03-18 PROCEDURE — 99999 PR PBB SHADOW E&M-EST. PATIENT-LVL V: CPT | Mod: PBBFAC,,, | Performed by: INTERNAL MEDICINE

## 2025-03-18 PROCEDURE — 1160F RVW MEDS BY RX/DR IN RCRD: CPT | Mod: CPTII,,, | Performed by: INTERNAL MEDICINE

## 2025-03-18 PROCEDURE — 36415 COLL VENOUS BLD VENIPUNCTURE: CPT | Mod: PN | Performed by: INTERNAL MEDICINE

## 2025-03-18 PROCEDURE — 85025 COMPLETE CBC W/AUTO DIFF WBC: CPT | Performed by: INTERNAL MEDICINE

## 2025-03-18 PROCEDURE — 99214 OFFICE O/P EST MOD 30 MIN: CPT | Mod: S$PBB,,, | Performed by: INTERNAL MEDICINE

## 2025-03-18 PROCEDURE — 1159F MED LIST DOCD IN RCRD: CPT | Mod: CPTII,,, | Performed by: INTERNAL MEDICINE

## 2025-03-18 PROCEDURE — 3008F BODY MASS INDEX DOCD: CPT | Mod: CPTII,,, | Performed by: INTERNAL MEDICINE

## 2025-03-18 PROCEDURE — 82728 ASSAY OF FERRITIN: CPT | Performed by: INTERNAL MEDICINE

## 2025-03-18 PROCEDURE — 3079F DIAST BP 80-89 MM HG: CPT | Mod: CPTII,,, | Performed by: INTERNAL MEDICINE

## 2025-03-18 PROCEDURE — 3077F SYST BP >= 140 MM HG: CPT | Mod: CPTII,,, | Performed by: INTERNAL MEDICINE

## 2025-03-18 PROCEDURE — 83540 ASSAY OF IRON: CPT | Performed by: INTERNAL MEDICINE

## 2025-03-18 NOTE — PROGRESS NOTES
"HISTORY OF PRESENT ILLNESS:  Ana Wharton is a 45 y.o. female who presents to the clinic today for follow up.    Last seen by me by virtual 7/2024.    Obesity  Her last menstrual period was March 1-5 with regular cycles. She is sexually active without using any form of contraception.  She has Topamax 25mg twice daily prescribed but is not currently taking it.    Wt Readings from Last 3 Encounters:   03/18/25 1344 106.3 kg (234 lb 5.6 oz)   11/11/24 1057 101.7 kg (224 lb 3.3 oz)   11/07/24 1111 101.2 kg (223 lb 1.7 oz)        Estimated body mass index is 39.72 kg/m² as calculated from the following:    Height as of 11/11/24: 5' 3" (1.6 m).    Weight as of 11/11/24: 101.7 kg (224 lb 3.3 oz).      She reports home blood pressure reading of 128/70 with recent increased salt intake. She started hydrochlorothiazide 5 days ago for leg swelling and notes continued diuretic effect.    She has sickle cell trait with stable anemia. She reports "severely low iron levels" with associated changes in fingernails and toe tips accompanied by pain. Previous iron supplements and prenatal vitamins caused "absorption issues".      ROS:  General: -fever, -chills, -fatigue, -weight gain, -weight loss  Eyes: -vision changes, -redness, -discharge  ENT: -ear pain, -nasal congestion, -sore throat  Cardiovascular: -chest pain, -palpitations, +lower extremity edema  Respiratory: -cough, -shortness of breath  Gastrointestinal: -abdominal pain, -nausea, -vomiting, -diarrhea, -constipation, -blood in stool  Genitourinary: -dysuria, -hematuria, -frequency, +excessive urination  Musculoskeletal: -joint pain, -muscle pain, +limb pain  Skin: -rash, -lesion  Neurological: -headache, -dizziness, -numbness, -tingling  Psychiatric: -anxiety, -depression, -sleep difficulty             PAST MEDICAL HISTORY:  Past Medical History:   Diagnosis Date    Allergy delotid    hives    Anemia trait    mother full blown sickle cell    Anxiety 05/11/21    BRCA1 " negative     Unknown    BRCA2 negative     Unknown    Hypertension     Hypothyroidism hyperthyroidism    Miscarriage 2018    Sickle cell anemia trait    Thyroid disease     hyperthyroidism        PAST SURGICAL HISTORY:  Past Surgical History:   Procedure Laterality Date    CHOLECYSTECTOMY  2014    ECTOPIC PREGNANCY SURGERY  2018    GALLBLADDER SURGERY         SOCIAL HISTORY:  Social History[1]    FAMILY HISTORY:  Family History   Problem Relation Name Age of Onset    Heart disease Mother Criss abarca 47    Diabetes Mother Criss abarca     Kidney disease Mother Criss abarca         on dialysis    Sickle cell anemia Mother Criss abarca     Kidney failure Mother Criss abarca     COPD Father      Heart failure Father      Breast cancer Sister Kimberly rudolph 49    Hypertension Sister Kimberly rudolph     Hashimoto's thyroiditis Sister kimberly gray     Scoliosis Sister kimberly gray     Cancer Sister kimberly gray     Lung cancer Maternal Grandmother lucretia collazo         smoker    Cancer Maternal Grandmother lucretia collazo     Sickle cell anemia Maternal Grandfather      Heart failure Paternal Grandfather      Diabetes Brother      Diabetes Brother cherise rudolph     Arthritis Brother cherise rudolph     Stroke Brother cherise rudolph     Diabetes Brother      Hypertension Brother         ALLERGIES AND MEDICATIONS: updated and reviewed.  Review of patient's allergies indicates:   Allergen Reactions    Dilaudid [hydromorphone (bulk)] Hives    Penicillins      hives     Medication List with Changes/Refills   Current Medications    CLINDAMYCIN PHOSPHATE 1% (CLINDAGEL) 1 % GEL    Apply topically 2 (two) times daily.    FLUCONAZOLE (DIFLUCAN) 150 MG TAB    Take 1 tablet (150 mg total) by mouth as needed. Take one pill weekly as needed for yeast infection.    HYDROCHLOROTHIAZIDE (HYDRODIURIL) 12.5 MG TAB    Take 1 tablet (12.5 mg total) by mouth daily as needed (leg swelling).    PRENATAL VITAMIN PLUS LOW IRON 27 MG IRON- 1 MG TAB    Take 1 tablet by  "mouth.    TOPIRAMATE (TOPAMAX) 25 MG TABLET    Take 1 tablet (25 mg total) by mouth 2 (two) times a day.          CARE TEAM:  Patient Care Team:  Chadwick Oakley MD as PCP - General (Internal Medicine)         PHYSICAL EXAM:   Vitals:    03/18/25 1420   BP: (!) 152/96   Pulse:    Temp:      Weight: 106.3 kg (234 lb 5.6 oz)   Height: 5' 3" (160 cm)   Body mass index is 41.51 kg/m².    Physical Exam    General: No acute distress. Well-developed. Well-nourished.  Eyes: EOMI. Sclerae anicteric.  HENT: Normocephalic. Atraumatic. Nares patent. Moist oral mucosa.  Ears: Bilateral TMs clear. Bilateral EACs clear.  Cardiovascular: Regular rate. Regular rhythm. No murmurs. No rubs. No gallops. Normal S1, S2.  Respiratory: Normal respiratory effort. Clear to auscultation bilaterally. No rales. No rhonchi. No wheezing.  Abdomen: Soft. Non-tender. Non-distended. Normoactive bowel sounds.  Musculoskeletal: No  obvious deformity.  Extremities: No lower extremity edema.  Neurological: Alert & oriented x3. No slurred speech. Normal gait.  Psychiatric: Normal mood. Normal affect. Good insight. Good judgment.  Skin: Warm. Dry. No rash.             ASSESSMENT AND PLAN:  Assessment & Plan    IMPRESSION:  - Evaluated blood pressure: elevated during visit, reported normal at home.  - Assessed anemia and iron deficiency: stable anemia with low iron levels from previous labs. Considered sickle cell trait in relation to anemia.  - Reviewed Topamax use: discontinued by patient due to pregnancy concerns.  - Continued HCTZ for leg swelling.    Healthcare maintenance  -     Ambulatory referral/consult to Obstetrics / Gynecology; Future; Expected date: 03/25/2025    Essential hypertension  Elevated blood pressure reading  - Return for nurse BP check. If remains above goal >135/80, consider daily HCTZ dosing.    Sickle cell trait  -     Ambulatory referral/consult to Hematology / Oncology; Future; Expected date: 03/25/2025  -     CBC Auto " Differential; Future; Expected date: 03/18/2025  -     Iron and TIBC; Future; Expected date: 03/18/2025  -     Ferritin; Future; Expected date: 03/18/2025    Class 3 severe obesity due to excess calories with serious comorbidity and body mass index (BMI) of 40.0 to 44.9 in adult    Anemia, unspecified type  -     Ambulatory referral/consult to Hematology / Oncology; Future; Expected date: 03/25/2025  -     CBC Auto Differential; Future; Expected date: 03/18/2025  -     Iron and TIBC; Future; Expected date: 03/18/2025  -     Ferritin; Future; Expected date: 03/18/2025    - Confirmed the patient has sickle cell trait.  - Noted the patient has a stable anemia associated with sickle cell trait.    - Ordered complete blood count, iron, and ferritin tests.  - Referred the patient to hematology clinic on [facility] [place], [place] for evaluation of anemia with concern for iron deficiency.  - Informed the patient that the hematology clinic will call to schedule an appointment.  - Noted the patient reports changes in fingernails and toes, with associated pain, indicating possible iron deficiency.  - Reviewed previous labs from November showing low iron levels.  - Assessed the patient's stable anemia with concern for iron deficiency.  - Advised the patient to include iron-rich foods in diet.  - Noted previous treatment with oral iron supplements and prenatal vitamins was ineffective.  - Ana to incorporate iron-rich foods into diet.    - Observed elevated blood pressure during the visit.  - Noted patient's reported home blood pressure reading of 128/70.  - Inquired about the patient's salt intake.  - Continued hydrochlorothiazide for blood pressure management.  - Noted patient reports continued urination 5 days after taking hydrochlorothiazide.  - Ana to reduce salt intake to help manage blood pressure.    - Confirmed hydrochlorothiazide is effective for leg swelling.  - Continued prescription of hydrochlorothiazide for leg  swelling.    - Explained potential interaction between topiramate and oral contraceptives, potentially reducing their effectiveness.  - Educated the patient on the risk of withdrawal seizures with sudden discontinuation of topiramate.  - Provided tapering instructions if the patient decides to discontinue: 25 mg daily for 3 days, then 25 mg every other day for 3-4 days, then stop.  - Discussed pregnancy prevention measures due to risks associated with topiramate use.  - Noted the patient reports not taking topiramate for a while.  - Advised against suddenly stopping topiramate due to risk of withdrawal seizures.    - Advised annual follow-up with gynecologist Dr. Dai.  - Reminded the patient is due for cervical cancer screening with gynecologist.                Follow up 6 months or sooner as needed.    This note was generated with the assistance of ambient listening technology. Verbal consent was obtained by the patient and accompanying visitor(s) for the recording of patient appointment to facilitate this note. I attest to having reviewed and edited the generated note for accuracy, though some syntax or spelling errors may persist. Please contact the author of this note for any clarification.           [1]   Social History  Socioeconomic History    Marital status:    Occupational History    Occupation: [employer]   Tobacco Use    Smoking status: Some Days     Types: Vaping w/o nicotine     Passive exposure: Never    Smokeless tobacco: Never   Substance and Sexual Activity    Alcohol use: No     Comment: socially    Drug use: No    Sexual activity: Yes     Partners: Male     Birth control/protection: None     Social Drivers of Health     Financial Resource Strain: Low Risk  (1/2/2024)    Overall Financial Resource Strain (CARDIA)     Difficulty of Paying Living Expenses: Not hard at all   Food Insecurity: No Food Insecurity (1/2/2024)    Hunger Vital Sign     Worried About Running Out of Food in the Last  Year: Never true     Ran Out of Food in the Last Year: Never true   Transportation Needs: No Transportation Needs (1/2/2024)    PRAPARE - Transportation     Lack of Transportation (Medical): No     Lack of Transportation (Non-Medical): No   Physical Activity: Insufficiently Active (1/2/2024)    Exercise Vital Sign     Days of Exercise per Week: 3 days     Minutes of Exercise per Session: 40 min   Stress: No Stress Concern Present (1/2/2024)    [employer] - Occupational Stress Questionnaire     Feeling of Stress : Not at all   Housing Stability: Low Risk  (1/2/2024)    Housing Stability Vital Sign     Unable to Pay for Housing in the Last Year: No     Number of Places Lived in the Last Year: 2     Unstable Housing in the Last Year: No

## 2025-03-24 ENCOUNTER — OFFICE VISIT (OUTPATIENT)
Dept: ENDOCRINOLOGY | Facility: CLINIC | Age: 45
End: 2025-03-24
Payer: MEDICAID

## 2025-03-24 VITALS
HEART RATE: 73 BPM | BODY MASS INDEX: 41.1 KG/M2 | DIASTOLIC BLOOD PRESSURE: 94 MMHG | SYSTOLIC BLOOD PRESSURE: 136 MMHG | WEIGHT: 232 LBS

## 2025-03-24 DIAGNOSIS — D50.0 IRON DEFICIENCY ANEMIA DUE TO CHRONIC BLOOD LOSS: ICD-10-CM

## 2025-03-24 DIAGNOSIS — E66.813 CLASS 3 SEVERE OBESITY DUE TO EXCESS CALORIES WITH SERIOUS COMORBIDITY AND BODY MASS INDEX (BMI) OF 40.0 TO 44.9 IN ADULT: ICD-10-CM

## 2025-03-24 DIAGNOSIS — E55.9 VITAMIN D DEFICIENCY: ICD-10-CM

## 2025-03-24 DIAGNOSIS — E06.3 HASHIMOTO'S THYROIDITIS: Primary | ICD-10-CM

## 2025-03-24 DIAGNOSIS — E05.90 HYPERTHYROIDISM: ICD-10-CM

## 2025-03-24 DIAGNOSIS — E66.01 CLASS 3 SEVERE OBESITY DUE TO EXCESS CALORIES WITH SERIOUS COMORBIDITY AND BODY MASS INDEX (BMI) OF 40.0 TO 44.9 IN ADULT: ICD-10-CM

## 2025-03-24 PROCEDURE — 99213 OFFICE O/P EST LOW 20 MIN: CPT | Mod: PBBFAC | Performed by: HOSPITALIST

## 2025-03-24 PROCEDURE — 3080F DIAST BP >= 90 MM HG: CPT | Mod: CPTII,,, | Performed by: HOSPITALIST

## 2025-03-24 PROCEDURE — 99214 OFFICE O/P EST MOD 30 MIN: CPT | Mod: S$PBB,,, | Performed by: HOSPITALIST

## 2025-03-24 PROCEDURE — 1159F MED LIST DOCD IN RCRD: CPT | Mod: CPTII,,, | Performed by: HOSPITALIST

## 2025-03-24 PROCEDURE — 3008F BODY MASS INDEX DOCD: CPT | Mod: CPTII,,, | Performed by: HOSPITALIST

## 2025-03-24 PROCEDURE — 3075F SYST BP GE 130 - 139MM HG: CPT | Mod: CPTII,,, | Performed by: HOSPITALIST

## 2025-03-24 PROCEDURE — G2211 COMPLEX E/M VISIT ADD ON: HCPCS | Mod: S$PBB,,, | Performed by: HOSPITALIST

## 2025-03-24 PROCEDURE — 1160F RVW MEDS BY RX/DR IN RCRD: CPT | Mod: CPTII,,, | Performed by: HOSPITALIST

## 2025-03-24 PROCEDURE — 99999 PR PBB SHADOW E&M-EST. PATIENT-LVL III: CPT | Mod: PBBFAC,,, | Performed by: HOSPITALIST

## 2025-03-24 NOTE — PROGRESS NOTES
Subjective:      Patient ID: Ana Wharton is a 45 y.o. female presented to Endocrinology clinic on 3/24/2025.    Chief Complaint:  Hashimoto's Thyroiditis    History of Present Illness: Ana Wharton is a 45 y.o. female with Hashimoto thyroiditis  Significant past medical history:  Obesity, hypertension  Previously under the care of outside endocrinologist Dr. Rehman at [facility]:  Patient is here for follow-up of Hashimoto's thyroiditis.  Last time seen by me 04/14/2023  Did not see me in 2024 due to change of insurance.  Now back on medication  Still with iron-deficiency anemia, has appointment with hematology 05/2025  Still with fatigue  Weight is stable:  Current in clinic weight 232 lb, previous weight:  229 lb in 2023  Denies any goiter      Hashimoto's thyroiditis  - Patient did have hyperthyroidism Diagnosed in 2018.  Been on methimazole since that time, noted abnormal lab work in 2018 after failed pregnancy>> at that time patient was started on methimazole  - TFTs were normal on lab work here as far back as 2021.  TPO mildly positive, TSI negative  - Methimazole was stopped 06/2021.  Denies any new onset palpitation.    - Patient denies lower neck enlargement.  Does have history of goiter.    - Ultrasound neck 2023 in 2021 show enlarged thyroid gland but no sign of nodule  - Sister with hashimoto and aunt has hyperthyroidism  - Herbal/Vitamin/Supplement:  Currently taking Vit D, Lemon balm (once a week for thyroid)    SYMPTOMS:  Anterior neck swelling/tenderness, or tightness: no   Hoarseness, dysphagia, odynophagia: no   Recent infection, Fever/chills/COVID infection: no   Drastic weight loss: no   Drastic weight gain: no  Depression symptoms: no   Fatigue: yes     Thyroid lab work  Lab Results   Component Value Date    TSH 1.196 11/08/2024    TSH 1.50 06/14/2023    TSH 2.15 01/23/2023    T4FREE 1.0 06/14/2023    T4FREE 0.9 01/23/2023    T4FREE 1.0 01/23/2023    FREET4 0.86 05/10/2021     FREET4 0.88 02/18/2021      Antibodies  Lab Results   Component Value Date    THYROPEROXID 97.2 (H) 05/10/2021    THYROIDAB 153 (H) 06/14/2023    THYROIDAB 139 (H) 01/23/2023    THYROIDSTIMI <0.10 05/10/2021       Thyroid ultrasound:   4/25/2023  The right lobe of the thyroid gland measures 1.9 x 2.0 x 6.2 cm in AP by TV by CC dimensions respectively.  The thyroid isthmus measures 7 mm in thickness.  The left lobe of the thyroid measures 1.9 x 1.7 x 5.4 cm in AP by TV by CC dimensions respectively.  Continued increased color flow and vascularity of the thyroid diffusely without focal lesion remains concerning for underlying thyroiditis.     Impression:  No significant change from prior.  Prominent diffuse thyroid with increased vascularity remains concerning for underlying thyroiditis.     5/10/2021  Thyroid is upper limits of normal in size.  Right lobe of the thyroid measures 5.7 x 1.8 x 1.8 cm.  Left lobe of the thyroid measures 5.2 x 1.6 x 1.8 cm.  No distinct thyroid nodules.  Mildly increased perfusion of the thyroid parenchyma which may be seen with thyroiditis.  No abnormal lymph nodes.     Impression:  Increased perfusion of the thyroid parenchyma which may be seen with thyroiditis.      Reviewed past surgical, medical, family, social history and updated as appropriate.  Review of Systems    Objective:   BP (!) 136/94   Pulse 73   Wt 105.2 kg (232 lb)   BMI 41.10 kg/m²     Body mass index is 41.1 kg/m².  Vital signs reviewed    Physical Exam  Vitals and nursing note reviewed.   Constitutional:       General: She is not in acute distress.     Appearance: Normal appearance. She is well-developed.   Neck:      Thyroid: No thyromegaly.   Musculoskeletal:      Cervical back: Neck supple.   Skin:     Findings: No erythema.   Neurological:      Mental Status: She is alert and oriented to person, place, and time.       Lab Reviewed:   Lab Results   Component Value Date    HGBA1C 5.1 11/08/2024     Lab Results    Component Value Date    CHOL 129 11/08/2024    HDL 35 (L) 11/08/2024    LDLCALC 75.6 11/08/2024    TRIG 92 11/08/2024    CHOLHDL 27.1 11/08/2024     Lab Results   Component Value Date     11/08/2024    K 4.2 11/08/2024     11/08/2024    CO2 23 11/08/2024    GLU 99 11/08/2024    BUN 10 11/08/2024    CREATININE 1.1 11/08/2024    CALCIUM 9.1 11/08/2024    PROT 7.9 11/08/2024    ALBUMIN 3.6 11/08/2024    BILITOT 0.4 11/08/2024    ALKPHOS 63 11/08/2024    AST 14 11/08/2024    ALT 12 11/08/2024    ANIONGAP 9 11/08/2024    ESTGFRAFRICA 102 04/05/2022    EGFRNONAA 88 04/05/2022    TSH 1.196 11/08/2024    FJOLLOFF34QH 22 (L) 02/18/2021     Assessment     1. Hashimoto's thyroiditis  TSH    T4, Free    Thyroid Peroxidase Antibody    US Thyroid      2. Vitamin D deficiency  Vitamin D      3. Class 3 severe obesity due to excess calories with serious comorbidity and body mass index (BMI) of 40.0 to 44.9 in adult        4. Hyperthyroidism        5. Iron deficiency anemia due to chronic blood loss            Plan     Hashimoto's thyroiditis  - Patient has a positive TPO antibody, indicating a risk for future autoimmune thyroid dysfunction. This was discussed in detail with the patient.  - Chronic issue, possibly due to Hashimoto's thyroiditis, given positive TPO antibody, negative TSI  - Previously on methimazole for which she has stop since 2021  - Currently, the patients TSH levels are within the normal range, noted in 2024, 2023>> with no signs of hypothyroidism.  - Physical exam showed  goiter    PLAN  - Check bloodwork 2025: TSH/T4/TPO  - Ultrasound thyroid 2025 due to goiter, if normal/stable in size no further workup needed given no history of nodule  - Advised annual TSH screening with their primary care provider yearly in the future if normal thyroid function 2025  - Recommended over-the-counter Vitamin D3 supplements for immune support.  - Would only treat with low-dose levothyroxine:  if TSH increased to  3.5    Class 3 severe obesity with body mass index (BMI) of 40.0 to 44.9 in adult  - Body mass index is 41.1 kg/m².  - continue monitoring with PCP  - benefit from bariatric surgery if covered by insurance, unfortunately not covered by [facility]    Hyperthyroidism  - stable, no longer needing methimazole  - this was stop 2021    Iron deficiency anemia due to chronic blood loss  - iron-deficiency anemia noted on bloodwork  - also possibly sickle-cell trait  - encourage patient to establish care with hematology as scheduled    Vitamin D deficiency  - Vitamin-D goal >30  - recommend OTC vitamin-D replacement  - check level 2025    Return yearly as needed    Visit today included increased complexity associated with the care of the episodic problem addressed and managing the longitudinal care of the patient due to the serious and/or complex managed problem(s).   Including:  Hashimoto's thyroiditis, goiter, history of hyperthyroidism, vitamin-D deficiency, obesity, anemia    Jesse Rehman M.D  Endocrinology  [facility]  3/24/2025      Disclaimer: This note has been generated using voice-recognition software. There may be typographical errors that have been missed during proof-reading.

## 2025-03-25 ENCOUNTER — HOSPITAL ENCOUNTER (OUTPATIENT)
Dept: RADIOLOGY | Facility: HOSPITAL | Age: 45
Discharge: HOME | End: 2025-03-25
Attending: HOSPITALIST
Payer: MEDICAID

## 2025-03-25 DIAGNOSIS — E06.3 HASHIMOTO'S THYROIDITIS: ICD-10-CM

## 2025-03-25 PROCEDURE — 76536 US EXAM OF HEAD AND NECK: CPT | Mod: TC

## 2025-03-25 PROCEDURE — 76536 US EXAM OF HEAD AND NECK: CPT | Mod: 26,,, | Performed by: RADIOLOGY

## 2025-03-25 NOTE — ASSESSMENT & PLAN NOTE
- Body mass index is 41.1 kg/m².  - continue monitoring with PCP  - benefit from bariatric surgery if covered by insurance, unfortunately not covered by [facility]

## 2025-03-25 NOTE — ASSESSMENT & PLAN NOTE
- Patient has a positive TPO antibody, indicating a risk for future autoimmune thyroid dysfunction. This was discussed in detail with the patient.  - Chronic issue, possibly due to Hashimoto's thyroiditis, given positive TPO antibody, negative TSI  - Previously on methimazole for which she has stop since 2021  - Currently, the patients TSH levels are within the normal range, noted in 2024, 2023>> with no signs of hypothyroidism.  - Physical exam showed  goiter    PLAN  - Check bloodwork 2025: TSH/T4/TPO  - Ultrasound thyroid 2025 due to goiter, if normal/stable in size no further workup needed given no history of nodule  - Advised annual TSH screening with their primary care provider yearly in the future if normal thyroid function 2025  - Recommended over-the-counter Vitamin D3 supplements for immune support.  - Would only treat with low-dose levothyroxine:  if TSH increased to 3.5

## 2025-03-25 NOTE — ASSESSMENT & PLAN NOTE
- iron-deficiency anemia noted on bloodwork  - also possibly sickle-cell trait  - encourage patient to establish care with hematology as scheduled

## 2025-03-26 ENCOUNTER — RESULTS FOLLOW-UP (OUTPATIENT)
Dept: ENDOCRINOLOGY | Facility: CLINIC | Age: 45
End: 2025-03-26

## 2025-03-31 ENCOUNTER — PATIENT MESSAGE (OUTPATIENT)
Dept: ADMINISTRATIVE | Facility: HOSPITAL | Age: 45
End: 2025-03-31
Payer: MEDICAID

## 2025-04-03 ENCOUNTER — RESULTS FOLLOW-UP (OUTPATIENT)
Dept: FAMILY MEDICINE | Facility: CLINIC | Age: 45
End: 2025-04-03

## 2025-04-03 ENCOUNTER — TELEPHONE (OUTPATIENT)
Dept: FAMILY MEDICINE | Facility: CLINIC | Age: 45
End: 2025-04-03
Payer: MEDICAID

## 2025-04-03 DIAGNOSIS — D50.9 IRON DEFICIENCY ANEMIA, UNSPECIFIED IRON DEFICIENCY ANEMIA TYPE: Primary | ICD-10-CM

## 2025-04-03 RX ORDER — FERROUS SULFATE 325(65) MG
325 TABLET ORAL EVERY OTHER DAY
Qty: 45 TABLET | Refills: 0 | Status: SHIPPED | OUTPATIENT
Start: 2025-04-03

## 2025-04-03 NOTE — TELEPHONE ENCOUNTER
----- Message from Chadwick Oakley MD sent at 4/3/2025  1:34 PM CDT -----  Please call to schedule anemia blood work nonfasting 3 months.  Msg sent on portal.  Advise to follow up with hematology as scheduled.  ----- Message -----  From: [place], [facility] Lab Interface  Sent: 3/18/2025   3:36 PM CDT  To: Chadwick Oakley MD

## 2025-04-04 ENCOUNTER — TELEPHONE (OUTPATIENT)
Dept: OBSTETRICS AND GYNECOLOGY | Facility: CLINIC | Age: 45
End: 2025-04-04
Payer: MEDICAID

## 2025-04-08 ENCOUNTER — CLINICAL SUPPORT (OUTPATIENT)
Dept: FAMILY MEDICINE | Facility: CLINIC | Age: 45
End: 2025-04-08
Payer: MEDICAID

## 2025-04-08 VITALS — SYSTOLIC BLOOD PRESSURE: 150 MMHG | DIASTOLIC BLOOD PRESSURE: 90 MMHG | HEART RATE: 78 BPM | OXYGEN SATURATION: 99 %

## 2025-04-08 DIAGNOSIS — I10 ESSENTIAL HYPERTENSION: Primary | ICD-10-CM

## 2025-04-08 PROCEDURE — 99999 PR PBB SHADOW E&M-EST. PATIENT-LVL III: CPT | Mod: PBBFAC,,,

## 2025-04-08 PROCEDURE — 99213 OFFICE O/P EST LOW 20 MIN: CPT | Mod: PBBFAC,PN

## 2025-04-08 NOTE — PROGRESS NOTES
Ana Wharton 45 y.o. female is here today for Blood Pressure check.   History of HTN yes.    Review of patient's allergies indicates:   Allergen Reactions    Dilaudid [hydromorphone (bulk)] Hives    Penicillins      hives     Creatinine   Date Value Ref Range Status   11/08/2024 1.1 0.5 - 1.4 mg/dL Final     Sodium   Date Value Ref Range Status   11/08/2024 138 136 - 145 mmol/L Final     Potassium   Date Value Ref Range Status   11/08/2024 4.2 3.5 - 5.1 mmol/L Final   ]  Patient denies taking blood pressure medications on a regular basis at the same time of the day. Patient explained hydrochlorothiazide is ordered for edema and only PRN.  Current Medications[1]  Does patient have record of home blood pressure readings no. Patient do not have a home blood pressure monitor. Patient is asymptomatic.   Complains of nothing.    BP: (!) 156/88 , Pulse: 85 .    Blood pressure reading after 15 minutes was 150/90, Pulse 78.  Dr. Oakley will be  notified.            [1]   Current Outpatient Medications:     clindamycin phosphate 1% (CLINDAGEL) 1 % gel, Apply topically 2 (two) times daily., Disp: 60 g, Rfl: 0    ferrous sulfate (FEOSOL) 325 mg (65 mg iron) Tab tablet, Take 1 tablet (325 mg total) by mouth every other day., Disp: 45 tablet, Rfl: 0    fluconazole (DIFLUCAN) 150 MG Tab, Take 1 tablet (150 mg total) by mouth as needed. Take one pill weekly as needed for yeast infection., Disp: 6 tablet, Rfl: 1    hydroCHLOROthiazide (HYDRODIURIL) 12.5 MG Tab, Take 1 tablet (12.5 mg total) by mouth daily as needed (leg swelling)., Disp: 30 tablet, Rfl: 0    PRENATAL VITAMIN PLUS LOW IRON 27 mg iron- 1 mg Tab, Take 1 tablet by mouth., Disp: , Rfl:     topiramate (TOPAMAX) 25 MG tablet, Take 1 tablet (25 mg total) by mouth 2 (two) times a day., Disp: 60 tablet, Rfl: 0

## 2025-04-09 ENCOUNTER — PATIENT MESSAGE (OUTPATIENT)
Dept: FAMILY MEDICINE | Facility: CLINIC | Age: 45
End: 2025-04-09
Payer: MEDICAID

## 2025-04-09 DIAGNOSIS — I10 ESSENTIAL HYPERTENSION: Primary | ICD-10-CM

## 2025-04-09 DIAGNOSIS — R60.0 LEG EDEMA: ICD-10-CM

## 2025-04-09 RX ORDER — HYDROCHLOROTHIAZIDE 12.5 MG/1
12.5 TABLET ORAL DAILY
Qty: 90 TABLET | Refills: 1 | Status: SHIPPED | OUTPATIENT
Start: 2025-04-09 | End: 2025-10-06

## 2025-04-09 RX ORDER — VALSARTAN 40 MG/1
40 TABLET ORAL DAILY
Qty: 90 TABLET | Refills: 3 | Status: SHIPPED | OUTPATIENT
Start: 2025-04-09 | End: 2026-04-09

## 2025-04-09 NOTE — TELEPHONE ENCOUNTER
Contact Chadwick Coronel MD recommendation:Your recent evaluation for blood pressure confirms that it remained elevated.  At this time, it is suggested for you to take daily medication for management of hypertension.  You are advised to take:     HYDROCHLOROTHIAZIDE 12.5 MG ONCE DAILY and  VALSARTAN 40 MG ONCE DAILY.     Schedule nurse blood pressure check 1-2 weeks and also as a visit with NP in 1-2 months.  Schedule nonfasting bmp 2 weeks.    All appointments scheduled as requested per Chadwick Oakley MD.

## 2025-04-21 ENCOUNTER — PATIENT MESSAGE (OUTPATIENT)
Dept: RADIOLOGY | Facility: HOSPITAL | Age: 45
End: 2025-04-21
Payer: MEDICAID

## 2025-04-25 ENCOUNTER — HOSPITAL ENCOUNTER (OUTPATIENT)
Dept: RADIOLOGY | Facility: HOSPITAL | Age: 45
Discharge: HOME | End: 2025-04-25
Attending: NURSE PRACTITIONER
Payer: MEDICAID

## 2025-04-25 DIAGNOSIS — Z80.3 FAMILY HISTORY OF BREAST CANCER: ICD-10-CM

## 2025-04-25 DIAGNOSIS — Z91.89 AT HIGH RISK FOR BREAST CANCER: ICD-10-CM

## 2025-04-25 PROCEDURE — 77067 SCR MAMMO BI INCL CAD: CPT | Mod: 26,,, | Performed by: RADIOLOGY

## 2025-04-25 PROCEDURE — 77063 BREAST TOMOSYNTHESIS BI: CPT | Mod: 26,,, | Performed by: RADIOLOGY

## 2025-04-25 PROCEDURE — 77063 BREAST TOMOSYNTHESIS BI: CPT | Mod: TC

## 2025-04-25 PROCEDURE — 25500020 PHARM REV CODE 255: Performed by: NURSE PRACTITIONER

## 2025-04-25 RX ADMIN — IOHEXOL 150 ML: 350 INJECTION, SOLUTION INTRAVENOUS at 01:04

## 2025-04-26 ENCOUNTER — LAB VISIT (OUTPATIENT)
Dept: LAB | Facility: HOSPITAL | Age: 45
End: 2025-04-26
Attending: INTERNAL MEDICINE
Payer: MEDICAID

## 2025-04-26 DIAGNOSIS — E06.3 HASHIMOTO'S THYROIDITIS: ICD-10-CM

## 2025-04-26 DIAGNOSIS — I10 ESSENTIAL HYPERTENSION: ICD-10-CM

## 2025-04-26 DIAGNOSIS — E55.9 VITAMIN D DEFICIENCY: ICD-10-CM

## 2025-04-26 LAB
ANION GAP (OHS): 8 MMOL/L (ref 8–16)
BUN SERPL-MCNC: 9 MG/DL (ref 6–20)
CALCIUM SERPL-MCNC: 8.7 MG/DL (ref 8.7–10.5)
CHLORIDE SERPL-SCNC: 106 MMOL/L (ref 95–110)
CO2 SERPL-SCNC: 23 MMOL/L (ref 23–29)
CREAT SERPL-MCNC: 0.9 MG/DL (ref 0.5–1.4)
GFR SERPLBLD CREATININE-BSD FMLA CKD-EPI: >60 ML/MIN/1.73/M2
GLUCOSE SERPL-MCNC: 101 MG/DL (ref 70–110)
POTASSIUM SERPL-SCNC: 4.1 MMOL/L (ref 3.5–5.1)
SODIUM SERPL-SCNC: 137 MMOL/L (ref 136–145)
T4 FREE SERPL-MCNC: 0.77 NG/DL (ref 0.71–1.51)
T4 FREE SERPL-MCNC: 0.77 NG/DL (ref 0.71–1.51)
TSH SERPL-ACNC: 2.03 UIU/ML (ref 0.4–4)

## 2025-04-26 PROCEDURE — 84443 ASSAY THYROID STIM HORMONE: CPT

## 2025-04-26 PROCEDURE — 86376 MICROSOMAL ANTIBODY EACH: CPT

## 2025-04-26 PROCEDURE — 80048 BASIC METABOLIC PNL TOTAL CA: CPT

## 2025-04-26 PROCEDURE — 82306 VITAMIN D 25 HYDROXY: CPT

## 2025-04-26 PROCEDURE — 36415 COLL VENOUS BLD VENIPUNCTURE: CPT

## 2025-04-27 LAB
25(OH)D3+25(OH)D2 SERPL-MCNC: 20 NG/ML (ref 30–96)
THYROPEROXIDASE IGG SERPL-ACNC: 133.9 IU/ML

## 2025-04-30 ENCOUNTER — RESULTS FOLLOW-UP (OUTPATIENT)
Dept: FAMILY MEDICINE | Facility: CLINIC | Age: 45
End: 2025-04-30

## 2025-05-13 ENCOUNTER — OFFICE VISIT (OUTPATIENT)
Dept: HEMATOLOGY/ONCOLOGY | Facility: CLINIC | Age: 45
End: 2025-05-13
Payer: MEDICAID

## 2025-05-13 DIAGNOSIS — D64.9 ANEMIA, UNSPECIFIED TYPE: ICD-10-CM

## 2025-05-13 DIAGNOSIS — D50.0 IRON DEFICIENCY ANEMIA DUE TO CHRONIC BLOOD LOSS: Primary | ICD-10-CM

## 2025-05-13 PROCEDURE — 98006 SYNCH AUDIO-VIDEO EST MOD 30: CPT | Mod: 95,,, | Performed by: INTERNAL MEDICINE

## 2025-05-13 PROCEDURE — 4010F ACE/ARB THERAPY RXD/TAKEN: CPT | Mod: CPTII,95,, | Performed by: INTERNAL MEDICINE

## 2025-05-13 PROCEDURE — 1159F MED LIST DOCD IN RCRD: CPT | Mod: CPTII,95,, | Performed by: INTERNAL MEDICINE

## 2025-05-13 RX ORDER — HEPARIN 100 UNIT/ML
500 SYRINGE INTRAVENOUS
OUTPATIENT
Start: 2025-05-20

## 2025-05-13 RX ORDER — SODIUM FERRIC GLUCONATE COMPLEX IN SUCROSE 12.5 MG/ML
125 INJECTION INTRAVENOUS
OUTPATIENT
Start: 2025-05-20

## 2025-05-13 RX ORDER — SODIUM CHLORIDE 0.9 % (FLUSH) 0.9 %
10 SYRINGE (ML) INJECTION
OUTPATIENT
Start: 2025-05-20

## 2025-05-13 RX ORDER — EPINEPHRINE 0.3 MG/.3ML
0.3 INJECTION SUBCUTANEOUS ONCE AS NEEDED
OUTPATIENT
Start: 2025-05-20

## 2025-05-13 NOTE — PROGRESS NOTES
The patient location is: [place]  The chief complaint leading to consultation is: Anemia    Visit type: audiovisual    Face to Face time with patient: 10  30 minutes of total time spent on the encounter, which includes face to face time and non-face to face time preparing to see the patient (eg, review of tests), Obtaining and/or reviewing separately obtained history, Documenting clinical information in the electronic or other health record, Independently interpreting results (not separately reported) and communicating results to the patient/family/caregiver, or Care coordination (not separately reported).         Each patient to whom he or she provides medical services by telemedicine is:  (1) informed of the relationship between the physician and patient and the respective role of any other health care provider with respect to management of the patient; and (2) notified that he or she may decline to receive medical services by telemedicine and may withdraw from such care at any time.    Notes:  see below  Subjective:       Patient ID: Ana Wharton is a 45 y.o. female.    Chief Complaint: Anemia    HPI    New patient visit for iron deficiency anemia due to menorrhagia. Previously seen by Dr. Abdi who ordered IV iron but it was never insurance approved and scheduled.     Ana has been severe iron deficient since at least 2022. Updated 2025 labs show ferritin of 6, hemoglobin of 8 grams, iron saturation of 5 and low MCV.    Still needs IV iron administration for relief.    Review of Systems   Constitutional:  Positive for unexpected weight change. Negative for appetite change.   HENT:  Negative for mouth sores.    Eyes:  Negative for visual disturbance.   Respiratory:  Negative for cough and shortness of breath.    Cardiovascular:  Negative for chest pain.   Gastrointestinal:  Negative for abdominal pain and diarrhea.   Genitourinary:  Negative for frequency.   Musculoskeletal:  Negative for back pain.    Integumentary:  Negative for rash.   Neurological:  Positive for headaches.   Hematological:  Negative for adenopathy.   Psychiatric/Behavioral:  The patient is not nervous/anxious.          Objective:      Physical Exam No exam for telehealth visit  Current Medications[1]   Lab Results   Component Value Date    WBC 9.53 03/18/2025    HGB 8.9 (L) 03/18/2025    HCT 28.6 (L) 03/18/2025    MCV 74 (L) 03/18/2025     03/18/2025        CMP  Sodium   Date Value Ref Range Status   04/26/2025 137 136 - 145 mmol/L Final   11/08/2024 138 136 - 145 mmol/L Final     Potassium   Date Value Ref Range Status   04/26/2025 4.1 3.5 - 5.1 mmol/L Final   11/08/2024 4.2 3.5 - 5.1 mmol/L Final     Chloride   Date Value Ref Range Status   04/26/2025 106 95 - 110 mmol/L Final   11/08/2024 106 95 - 110 mmol/L Final     CO2   Date Value Ref Range Status   04/26/2025 23 23 - 29 mmol/L Final   11/08/2024 23 23 - 29 mmol/L Final     Glucose   Date Value Ref Range Status   04/26/2025 101 70 - 110 mg/dL Final   11/08/2024 99 70 - 110 mg/dL Final     BUN   Date Value Ref Range Status   04/26/2025 9 6 - 20 mg/dL Final     Creatinine   Date Value Ref Range Status   04/26/2025 0.9 0.5 - 1.4 mg/dL Final     Calcium   Date Value Ref Range Status   04/26/2025 8.7 8.7 - 10.5 mg/dL Final   11/08/2024 9.1 8.7 - 10.5 mg/dL Final     Total Protein   Date Value Ref Range Status   11/08/2024 7.9 6.0 - 8.4 g/dL Final     Albumin   Date Value Ref Range Status   11/08/2024 3.6 3.5 - 5.2 g/dL Final     Total Bilirubin   Date Value Ref Range Status   11/08/2024 0.4 0.1 - 1.0 mg/dL Final     Comment:     For infants and newborns, interpretation of results should be based  on gestational age, weight and in agreement with clinical  observations.    Premature Infant recommended reference ranges:  Up to 24 hours.............<8.0 mg/dL  Up to 48 hours............<12.0 mg/dL  3-5 days..................<15.0 mg/dL  6-29 days.................<15.0 mg/dL        Alkaline Phosphatase   Date Value Ref Range Status   11/08/2024 63 40 - 150 U/L Final     AST   Date Value Ref Range Status   11/08/2024 14 10 - 40 U/L Final     ALT   Date Value Ref Range Status   11/08/2024 12 10 - 44 U/L Final     Anion Gap   Date Value Ref Range Status   04/26/2025 8 8 - 16 mmol/L Final     eGFR if    Date Value Ref Range Status   04/05/2022 102 > OR = 60 mL/min/1.73m2 Final     eGFR if non    Date Value Ref Range Status   04/05/2022 88 > OR = 60 mL/min/1.73m2 Final          Assessment:       Problem List Items Addressed This Visit          Oncology    Sickle cell trait     Other Visit Diagnoses         Anemia, unspecified type                Plan:       Iron deficiency anemia due to chronic blood loss.  Iron deficiency and anemia is severe and needs IV iron replacement      BMT Chart Routing      Follow up with physician 6 months.   Follow up with [facility]    Provider visit type    Infusion scheduling note   8 inufsion of ferrlecit scheduled once authorized   Injection scheduling note    Labs CBC, iron and TIBC and ferritin   Scheduling:  Preferred lab:  Lab interval:  labs a  few days before return visit in 6 months   Imaging    Pharmacy appointment    Other referrals                                [1]   Current Outpatient Medications   Medication Sig Dispense Refill    clindamycin phosphate 1% (CLINDAGEL) 1 % gel Apply topically 2 (two) times daily. 60 g 0    ferrous sulfate (FEOSOL) 325 mg (65 mg iron) Tab tablet Take 1 tablet (325 mg total) by mouth every other day. 45 tablet 0    fluconazole (DIFLUCAN) 150 MG Tab Take 1 tablet (150 mg total) by mouth as needed. Take one pill weekly as needed for yeast infection. 6 tablet 1    hydroCHLOROthiazide 12.5 MG Tab Take 1 tablet (12.5 mg total) by mouth once daily. 90 tablet 1    PRENATAL VITAMIN PLUS LOW IRON 27 mg iron- 1 mg Tab Take 1 tablet by mouth.      topiramate (TOPAMAX) 25 MG tablet Take 1 tablet (25 mg  total) by mouth 2 (two) times a day. 60 tablet 0    valsartan (DIOVAN) 40 MG tablet Take 1 tablet (40 mg total) by mouth once daily. 90 tablet 3     No current facility-administered medications for this visit.

## 2025-06-06 ENCOUNTER — INFUSION (OUTPATIENT)
Dept: INFUSION THERAPY | Facility: HOSPITAL | Age: 45
End: 2025-06-06
Attending: SURGERY
Payer: MEDICAID

## 2025-06-06 VITALS
BODY MASS INDEX: 40.66 KG/M2 | HEIGHT: 63 IN | SYSTOLIC BLOOD PRESSURE: 142 MMHG | DIASTOLIC BLOOD PRESSURE: 90 MMHG | WEIGHT: 229.5 LBS | TEMPERATURE: 99 F | RESPIRATION RATE: 18 BRPM | HEART RATE: 72 BPM

## 2025-06-06 DIAGNOSIS — D50.0 IRON DEFICIENCY ANEMIA DUE TO CHRONIC BLOOD LOSS: Primary | ICD-10-CM

## 2025-06-06 PROCEDURE — 96374 THER/PROPH/DIAG INJ IV PUSH: CPT

## 2025-06-06 PROCEDURE — 63600175 PHARM REV CODE 636 W HCPCS: Performed by: INTERNAL MEDICINE

## 2025-06-06 PROCEDURE — 25000003 PHARM REV CODE 250: Performed by: INTERNAL MEDICINE

## 2025-06-06 RX ORDER — HEPARIN 100 UNIT/ML
500 SYRINGE INTRAVENOUS
OUTPATIENT
Start: 2025-06-13

## 2025-06-06 RX ORDER — EPINEPHRINE 0.3 MG/.3ML
0.3 INJECTION SUBCUTANEOUS ONCE AS NEEDED
Status: DISCONTINUED | OUTPATIENT
Start: 2025-06-06 | End: 2025-06-06 | Stop reason: HOSPADM

## 2025-06-06 RX ORDER — HEPARIN 100 UNIT/ML
500 SYRINGE INTRAVENOUS
Status: DISCONTINUED | OUTPATIENT
Start: 2025-06-06 | End: 2025-06-06 | Stop reason: HOSPADM

## 2025-06-06 RX ORDER — SODIUM CHLORIDE 0.9 % (FLUSH) 0.9 %
10 SYRINGE (ML) INJECTION
OUTPATIENT
Start: 2025-06-13

## 2025-06-06 RX ORDER — SODIUM FERRIC GLUCONATE COMPLEX IN SUCROSE 12.5 MG/ML
125 INJECTION INTRAVENOUS
Status: COMPLETED | OUTPATIENT
Start: 2025-06-06 | End: 2025-06-06

## 2025-06-06 RX ORDER — SODIUM FERRIC GLUCONATE COMPLEX IN SUCROSE 12.5 MG/ML
125 INJECTION INTRAVENOUS
OUTPATIENT
Start: 2025-06-13

## 2025-06-06 RX ORDER — SODIUM CHLORIDE 0.9 % (FLUSH) 0.9 %
10 SYRINGE (ML) INJECTION
Status: DISCONTINUED | OUTPATIENT
Start: 2025-06-06 | End: 2025-06-06 | Stop reason: HOSPADM

## 2025-06-06 RX ORDER — EPINEPHRINE 0.3 MG/.3ML
0.3 INJECTION SUBCUTANEOUS ONCE AS NEEDED
OUTPATIENT
Start: 2025-06-13

## 2025-06-06 RX ADMIN — SODIUM FERRIC GLUCONATE COMPLEX IN SUCROSE 125 MG: 12.5 INJECTION INTRAVENOUS at 01:06

## 2025-06-06 RX ADMIN — SODIUM CHLORIDE: 9 INJECTION, SOLUTION INTRAVENOUS at 01:06

## 2025-06-09 ENCOUNTER — PATIENT MESSAGE (OUTPATIENT)
Dept: ADMINISTRATIVE | Facility: HOSPITAL | Age: 45
End: 2025-06-09
Payer: MEDICAID

## 2025-06-13 ENCOUNTER — INFUSION (OUTPATIENT)
Dept: INFUSION THERAPY | Facility: HOSPITAL | Age: 45
End: 2025-06-13
Payer: MEDICAID

## 2025-06-13 VITALS
SYSTOLIC BLOOD PRESSURE: 137 MMHG | TEMPERATURE: 99 F | RESPIRATION RATE: 18 BRPM | DIASTOLIC BLOOD PRESSURE: 80 MMHG | HEART RATE: 67 BPM

## 2025-06-13 DIAGNOSIS — D50.0 IRON DEFICIENCY ANEMIA DUE TO CHRONIC BLOOD LOSS: Primary | ICD-10-CM

## 2025-06-13 PROCEDURE — 63600175 PHARM REV CODE 636 W HCPCS: Performed by: INTERNAL MEDICINE

## 2025-06-13 PROCEDURE — 96374 THER/PROPH/DIAG INJ IV PUSH: CPT

## 2025-06-13 RX ORDER — HEPARIN 100 UNIT/ML
500 SYRINGE INTRAVENOUS
Status: DISCONTINUED | OUTPATIENT
Start: 2025-06-13 | End: 2025-06-13 | Stop reason: HOSPADM

## 2025-06-13 RX ORDER — SODIUM CHLORIDE 0.9 % (FLUSH) 0.9 %
10 SYRINGE (ML) INJECTION
Status: DISCONTINUED | OUTPATIENT
Start: 2025-06-13 | End: 2025-06-13 | Stop reason: HOSPADM

## 2025-06-13 RX ORDER — HEPARIN 100 UNIT/ML
500 SYRINGE INTRAVENOUS
OUTPATIENT
Start: 2025-06-20

## 2025-06-13 RX ORDER — SODIUM FERRIC GLUCONATE COMPLEX IN SUCROSE 12.5 MG/ML
125 INJECTION INTRAVENOUS
OUTPATIENT
Start: 2025-06-20

## 2025-06-13 RX ORDER — EPINEPHRINE 0.3 MG/.3ML
0.3 INJECTION SUBCUTANEOUS ONCE AS NEEDED
OUTPATIENT
Start: 2025-06-20

## 2025-06-13 RX ORDER — SODIUM CHLORIDE 0.9 % (FLUSH) 0.9 %
10 SYRINGE (ML) INJECTION
OUTPATIENT
Start: 2025-06-20

## 2025-06-13 RX ORDER — SODIUM FERRIC GLUCONATE COMPLEX IN SUCROSE 12.5 MG/ML
125 INJECTION INTRAVENOUS
Status: COMPLETED | OUTPATIENT
Start: 2025-06-13 | End: 2025-06-13

## 2025-06-13 RX ADMIN — SODIUM FERRIC GLUCONATE COMPLEX IN SUCROSE 125 MG: 12.5 INJECTION INTRAVENOUS at 01:06

## 2025-06-13 NOTE — PLAN OF CARE
1318- Patient returns for second dose of ferrlecit, reports no issues following first dose. Labs , hx, and medications reviewed and assessment completed.   24g IV placed to left antecubital through which slow IV push ferrlecit was administered.  Patient declined observation, PIV was removed and site dressed.  Patient will return next week for dose 3, instructed her to contact Md with any issues or concerns.

## 2025-06-20 ENCOUNTER — INFUSION (OUTPATIENT)
Dept: INFUSION THERAPY | Facility: HOSPITAL | Age: 45
End: 2025-06-20
Payer: MEDICAID

## 2025-06-20 VITALS
SYSTOLIC BLOOD PRESSURE: 130 MMHG | RESPIRATION RATE: 18 BRPM | HEART RATE: 69 BPM | TEMPERATURE: 99 F | DIASTOLIC BLOOD PRESSURE: 80 MMHG

## 2025-06-20 DIAGNOSIS — D50.0 IRON DEFICIENCY ANEMIA DUE TO CHRONIC BLOOD LOSS: Primary | ICD-10-CM

## 2025-06-20 PROCEDURE — 96374 THER/PROPH/DIAG INJ IV PUSH: CPT

## 2025-06-20 PROCEDURE — 63600175 PHARM REV CODE 636 W HCPCS: Performed by: INTERNAL MEDICINE

## 2025-06-20 RX ORDER — SODIUM CHLORIDE 0.9 % (FLUSH) 0.9 %
10 SYRINGE (ML) INJECTION
OUTPATIENT
Start: 2025-06-27

## 2025-06-20 RX ORDER — SODIUM CHLORIDE 0.9 % (FLUSH) 0.9 %
10 SYRINGE (ML) INJECTION
Status: DISCONTINUED | OUTPATIENT
Start: 2025-06-20 | End: 2025-06-20 | Stop reason: HOSPADM

## 2025-06-20 RX ORDER — HEPARIN 100 UNIT/ML
500 SYRINGE INTRAVENOUS
Status: DISCONTINUED | OUTPATIENT
Start: 2025-06-20 | End: 2025-06-20 | Stop reason: HOSPADM

## 2025-06-20 RX ORDER — SODIUM FERRIC GLUCONATE COMPLEX IN SUCROSE 12.5 MG/ML
125 INJECTION INTRAVENOUS
OUTPATIENT
Start: 2025-06-27

## 2025-06-20 RX ORDER — HEPARIN 100 UNIT/ML
500 SYRINGE INTRAVENOUS
OUTPATIENT
Start: 2025-06-27

## 2025-06-20 RX ORDER — SODIUM FERRIC GLUCONATE COMPLEX IN SUCROSE 12.5 MG/ML
125 INJECTION INTRAVENOUS
Status: COMPLETED | OUTPATIENT
Start: 2025-06-20 | End: 2025-06-20

## 2025-06-20 RX ORDER — EPINEPHRINE 0.3 MG/.3ML
0.3 INJECTION SUBCUTANEOUS ONCE AS NEEDED
OUTPATIENT
Start: 2025-06-27

## 2025-06-20 RX ADMIN — SODIUM FERRIC GLUCONATE COMPLEX IN SUCROSE 125 MG: 12.5 INJECTION INTRAVENOUS at 01:06

## 2025-06-20 NOTE — PLAN OF CARE
1330- Patient here for IV iron, has no new complaints, no distress noted. .  Ferrlecit administered IV push over 10 minutes,  tolerated well.  Patient declined post observation, PIV was removed and site dressed.  Patient was given AVS and discharged to home setting, instructed her to contact MD with any issues or concerns and will return for treatment next week.

## 2025-06-27 ENCOUNTER — INFUSION (OUTPATIENT)
Dept: INFUSION THERAPY | Facility: HOSPITAL | Age: 45
End: 2025-06-27
Payer: MEDICAID

## 2025-06-27 VITALS
RESPIRATION RATE: 18 BRPM | OXYGEN SATURATION: 100 % | SYSTOLIC BLOOD PRESSURE: 155 MMHG | HEART RATE: 60 BPM | TEMPERATURE: 99 F | DIASTOLIC BLOOD PRESSURE: 81 MMHG

## 2025-06-27 DIAGNOSIS — D50.0 IRON DEFICIENCY ANEMIA DUE TO CHRONIC BLOOD LOSS: Primary | ICD-10-CM

## 2025-06-27 DIAGNOSIS — L70.9 ACNE, UNSPECIFIED ACNE TYPE: ICD-10-CM

## 2025-06-27 PROCEDURE — 63600175 PHARM REV CODE 636 W HCPCS: Performed by: INTERNAL MEDICINE

## 2025-06-27 PROCEDURE — 25000003 PHARM REV CODE 250: Performed by: INTERNAL MEDICINE

## 2025-06-27 PROCEDURE — 96374 THER/PROPH/DIAG INJ IV PUSH: CPT

## 2025-06-27 RX ORDER — HEPARIN 100 UNIT/ML
500 SYRINGE INTRAVENOUS
Status: DISCONTINUED | OUTPATIENT
Start: 2025-06-27 | End: 2025-06-27 | Stop reason: HOSPADM

## 2025-06-27 RX ORDER — SODIUM FERRIC GLUCONATE COMPLEX IN SUCROSE 12.5 MG/ML
125 INJECTION INTRAVENOUS
OUTPATIENT
Start: 2025-07-04

## 2025-06-27 RX ORDER — HEPARIN 100 UNIT/ML
500 SYRINGE INTRAVENOUS
OUTPATIENT
Start: 2025-07-04

## 2025-06-27 RX ORDER — SODIUM FERRIC GLUCONATE COMPLEX IN SUCROSE 12.5 MG/ML
125 INJECTION INTRAVENOUS
Status: COMPLETED | OUTPATIENT
Start: 2025-06-27 | End: 2025-06-27

## 2025-06-27 RX ORDER — SODIUM CHLORIDE 0.9 % (FLUSH) 0.9 %
10 SYRINGE (ML) INJECTION
OUTPATIENT
Start: 2025-07-04

## 2025-06-27 RX ORDER — SODIUM CHLORIDE 0.9 % (FLUSH) 0.9 %
10 SYRINGE (ML) INJECTION
Status: DISCONTINUED | OUTPATIENT
Start: 2025-06-27 | End: 2025-06-27 | Stop reason: HOSPADM

## 2025-06-27 RX ORDER — EPINEPHRINE 0.3 MG/.3ML
0.3 INJECTION SUBCUTANEOUS ONCE AS NEEDED
OUTPATIENT
Start: 2025-07-04

## 2025-06-27 RX ADMIN — SODIUM FERRIC GLUCONATE COMPLEX IN SUCROSE 125 MG: 12.5 INJECTION INTRAVENOUS at 01:06

## 2025-06-27 RX ADMIN — SODIUM CHLORIDE: 9 INJECTION, SOLUTION INTRAVENOUS at 12:06

## 2025-06-27 NOTE — TELEPHONE ENCOUNTER
Refill Routing Note   Medication(s) are not appropriate for processing by [facility] for the following reason(s):        Outside of protocol    [facility] action(s):  Route               Appointments  past 12m or future 3m with PCP    Date Provider   Last Visit   3/18/2025 Chadwick Oakley MD   Next Visit   9/30/2025 Chadwick Oakley MD   ED visits in past 90 days: 0        Note composed:9:30 AM 06/27/2025

## 2025-06-27 NOTE — PLAN OF CARE
1320 Patient tolerated #4 ferrlecit given IVP with no s/s of reaction and no complaints. She declined the observation time. PIV removed and catheter tip intact.

## 2025-07-01 RX ORDER — CLINDAMYCIN PHOSPHATE 10 MG/G
GEL TOPICAL 2 TIMES DAILY
Qty: 60 G | Refills: 0 | Status: SHIPPED | OUTPATIENT
Start: 2025-07-01

## 2025-07-02 ENCOUNTER — PATIENT OUTREACH (OUTPATIENT)
Dept: ADMINISTRATIVE | Facility: HOSPITAL | Age: 45
End: 2025-07-02
Payer: MEDICAID

## 2025-07-02 NOTE — PROGRESS NOTES
Portal message sent with tasked appointment so pt can schedule her cervical cancer screening. Immunization's updated/triggered.

## 2025-07-03 ENCOUNTER — INFUSION (OUTPATIENT)
Dept: INFUSION THERAPY | Facility: HOSPITAL | Age: 45
End: 2025-07-03
Payer: MEDICAID

## 2025-07-03 VITALS
RESPIRATION RATE: 18 BRPM | DIASTOLIC BLOOD PRESSURE: 78 MMHG | TEMPERATURE: 99 F | SYSTOLIC BLOOD PRESSURE: 140 MMHG | HEART RATE: 64 BPM

## 2025-07-03 DIAGNOSIS — D50.0 IRON DEFICIENCY ANEMIA DUE TO CHRONIC BLOOD LOSS: Primary | ICD-10-CM

## 2025-07-03 PROCEDURE — 96374 THER/PROPH/DIAG INJ IV PUSH: CPT

## 2025-07-03 PROCEDURE — 25000003 PHARM REV CODE 250: Performed by: INTERNAL MEDICINE

## 2025-07-03 PROCEDURE — 63600175 PHARM REV CODE 636 W HCPCS: Performed by: INTERNAL MEDICINE

## 2025-07-03 RX ORDER — EPINEPHRINE 0.3 MG/.3ML
0.3 INJECTION SUBCUTANEOUS ONCE AS NEEDED
Status: DISCONTINUED | OUTPATIENT
Start: 2025-07-03 | End: 2025-07-03 | Stop reason: HOSPADM

## 2025-07-03 RX ORDER — HEPARIN 100 UNIT/ML
500 SYRINGE INTRAVENOUS
Status: DISCONTINUED | OUTPATIENT
Start: 2025-07-03 | End: 2025-07-03 | Stop reason: HOSPADM

## 2025-07-03 RX ORDER — SODIUM FERRIC GLUCONATE COMPLEX IN SUCROSE 12.5 MG/ML
125 INJECTION INTRAVENOUS
OUTPATIENT
Start: 2025-07-04

## 2025-07-03 RX ORDER — SODIUM FERRIC GLUCONATE COMPLEX IN SUCROSE 12.5 MG/ML
125 INJECTION INTRAVENOUS
Status: COMPLETED | OUTPATIENT
Start: 2025-07-03 | End: 2025-07-03

## 2025-07-03 RX ORDER — SODIUM CHLORIDE 0.9 % (FLUSH) 0.9 %
10 SYRINGE (ML) INJECTION
OUTPATIENT
Start: 2025-07-04

## 2025-07-03 RX ORDER — HEPARIN 100 UNIT/ML
500 SYRINGE INTRAVENOUS
OUTPATIENT
Start: 2025-07-04

## 2025-07-03 RX ORDER — EPINEPHRINE 0.3 MG/.3ML
0.3 INJECTION SUBCUTANEOUS ONCE AS NEEDED
OUTPATIENT
Start: 2025-07-04

## 2025-07-03 RX ORDER — SODIUM CHLORIDE 0.9 % (FLUSH) 0.9 %
10 SYRINGE (ML) INJECTION
Status: DISCONTINUED | OUTPATIENT
Start: 2025-07-03 | End: 2025-07-03 | Stop reason: HOSPADM

## 2025-07-03 RX ADMIN — SODIUM CHLORIDE: 0.9 INJECTION, SOLUTION INTRAVENOUS at 01:07

## 2025-07-03 RX ADMIN — SODIUM FERRIC GLUCONATE COMPLEX IN SUCROSE 125 MG: 12.5 INJECTION INTRAVENOUS at 01:07

## 2025-07-03 NOTE — PLAN OF CARE
1400 pt here for Ferrlecit IVP, pt tolerated well with no issue, pt to rtc 7/11/25, no distress noted upon d/c to home

## 2025-07-11 ENCOUNTER — INFUSION (OUTPATIENT)
Dept: INFUSION THERAPY | Facility: HOSPITAL | Age: 45
End: 2025-07-11
Payer: MEDICAID

## 2025-07-11 VITALS
DIASTOLIC BLOOD PRESSURE: 74 MMHG | TEMPERATURE: 98 F | SYSTOLIC BLOOD PRESSURE: 136 MMHG | HEART RATE: 66 BPM | RESPIRATION RATE: 18 BRPM

## 2025-07-11 DIAGNOSIS — D50.0 IRON DEFICIENCY ANEMIA DUE TO CHRONIC BLOOD LOSS: Primary | ICD-10-CM

## 2025-07-11 PROCEDURE — 25000003 PHARM REV CODE 250: Performed by: INTERNAL MEDICINE

## 2025-07-11 PROCEDURE — 63600175 PHARM REV CODE 636 W HCPCS: Performed by: INTERNAL MEDICINE

## 2025-07-11 PROCEDURE — 96374 THER/PROPH/DIAG INJ IV PUSH: CPT

## 2025-07-11 RX ORDER — SODIUM FERRIC GLUCONATE COMPLEX IN SUCROSE 12.5 MG/ML
125 INJECTION INTRAVENOUS
Status: COMPLETED | OUTPATIENT
Start: 2025-07-11 | End: 2025-07-11

## 2025-07-11 RX ORDER — SODIUM CHLORIDE 0.9 % (FLUSH) 0.9 %
10 SYRINGE (ML) INJECTION
Status: DISCONTINUED | OUTPATIENT
Start: 2025-07-11 | End: 2025-07-11 | Stop reason: HOSPADM

## 2025-07-11 RX ORDER — SODIUM FERRIC GLUCONATE COMPLEX IN SUCROSE 12.5 MG/ML
125 INJECTION INTRAVENOUS
OUTPATIENT
Start: 2025-07-18

## 2025-07-11 RX ORDER — HEPARIN 100 UNIT/ML
500 SYRINGE INTRAVENOUS
Status: DISCONTINUED | OUTPATIENT
Start: 2025-07-11 | End: 2025-07-11 | Stop reason: HOSPADM

## 2025-07-11 RX ORDER — SODIUM CHLORIDE 0.9 % (FLUSH) 0.9 %
10 SYRINGE (ML) INJECTION
OUTPATIENT
Start: 2025-07-18

## 2025-07-11 RX ORDER — EPINEPHRINE 0.3 MG/.3ML
0.3 INJECTION SUBCUTANEOUS ONCE AS NEEDED
OUTPATIENT
Start: 2025-07-18

## 2025-07-11 RX ORDER — HEPARIN 100 UNIT/ML
500 SYRINGE INTRAVENOUS
OUTPATIENT
Start: 2025-07-18

## 2025-07-11 RX ORDER — EPINEPHRINE 0.3 MG/.3ML
0.3 INJECTION SUBCUTANEOUS ONCE AS NEEDED
Status: DISCONTINUED | OUTPATIENT
Start: 2025-07-11 | End: 2025-07-11 | Stop reason: HOSPADM

## 2025-07-11 RX ADMIN — SODIUM FERRIC GLUCONATE COMPLEX IN SUCROSE 125 MG: 12.5 INJECTION INTRAVENOUS at 01:07

## 2025-07-11 RX ADMIN — SODIUM CHLORIDE: 0.9 INJECTION, SOLUTION INTRAVENOUS at 01:07

## 2025-07-11 NOTE — PLAN OF CARE
1345 pt tolerated Ferrlecit IVP without issue, pt to rtc 7/17/25, no distress noted upon d/c to home

## 2025-07-25 ENCOUNTER — INFUSION (OUTPATIENT)
Dept: INFUSION THERAPY | Facility: HOSPITAL | Age: 45
End: 2025-07-25
Payer: MEDICAID

## 2025-07-25 VITALS
DIASTOLIC BLOOD PRESSURE: 85 MMHG | OXYGEN SATURATION: 100 % | HEART RATE: 55 BPM | RESPIRATION RATE: 16 BRPM | SYSTOLIC BLOOD PRESSURE: 167 MMHG

## 2025-07-25 DIAGNOSIS — D50.0 IRON DEFICIENCY ANEMIA DUE TO CHRONIC BLOOD LOSS: Primary | ICD-10-CM

## 2025-07-25 PROCEDURE — 63600175 PHARM REV CODE 636 W HCPCS: Performed by: INTERNAL MEDICINE

## 2025-07-25 PROCEDURE — 96374 THER/PROPH/DIAG INJ IV PUSH: CPT

## 2025-07-25 RX ORDER — HEPARIN 100 UNIT/ML
500 SYRINGE INTRAVENOUS
Status: DISCONTINUED | OUTPATIENT
Start: 2025-07-25 | End: 2025-07-25 | Stop reason: HOSPADM

## 2025-07-25 RX ORDER — SODIUM FERRIC GLUCONATE COMPLEX IN SUCROSE 12.5 MG/ML
125 INJECTION INTRAVENOUS
Status: COMPLETED | OUTPATIENT
Start: 2025-07-25 | End: 2025-07-25

## 2025-07-25 RX ORDER — SODIUM FERRIC GLUCONATE COMPLEX IN SUCROSE 12.5 MG/ML
125 INJECTION INTRAVENOUS
OUTPATIENT
Start: 2025-08-01

## 2025-07-25 RX ORDER — SODIUM CHLORIDE 0.9 % (FLUSH) 0.9 %
10 SYRINGE (ML) INJECTION
Status: DISCONTINUED | OUTPATIENT
Start: 2025-07-25 | End: 2025-07-25 | Stop reason: HOSPADM

## 2025-07-25 RX ORDER — EPINEPHRINE 0.3 MG/.3ML
0.3 INJECTION SUBCUTANEOUS ONCE AS NEEDED
OUTPATIENT
Start: 2025-08-01

## 2025-07-25 RX ORDER — SODIUM CHLORIDE 0.9 % (FLUSH) 0.9 %
10 SYRINGE (ML) INJECTION
OUTPATIENT
Start: 2025-08-01

## 2025-07-25 RX ORDER — HEPARIN 100 UNIT/ML
500 SYRINGE INTRAVENOUS
OUTPATIENT
Start: 2025-08-01

## 2025-07-25 RX ADMIN — SODIUM FERRIC GLUCONATE COMPLEX IN SUCROSE 125 MG: 12.5 INJECTION INTRAVENOUS at 12:07

## 2025-07-25 NOTE — PLAN OF CARE
Patient seated in chair, VSS, assessment done. PIV inserted, flushed, blood return noted, Ferrlecit administered IVP, flushed, tolerated well. PIV discontinued without issue. Pt ambulated off floor. RTC 7/31/25

## 2025-07-30 ENCOUNTER — TELEPHONE (OUTPATIENT)
Dept: HEMATOLOGY/ONCOLOGY | Facility: CLINIC | Age: 45
End: 2025-07-30
Payer: MEDICAID

## 2025-07-30 NOTE — TELEPHONE ENCOUNTER
Copied from CRM #[id number]. Topic: Appointments - Appointment Rescheduling  >> Jul 30, 2025  3:19 PM Jessica wrote:  Appt Type: Infusion     Date/Time Preference:  08/08/25     Treating Provider:  Cony Arthur MD     Caller Name: Ana      Contact Prefer:  [phone]     Comments/Notes:  Called to reschedule infusion appt for 08/08/25 if possible  due to car issues

## 2025-08-08 ENCOUNTER — INFUSION (OUTPATIENT)
Dept: INFUSION THERAPY | Facility: HOSPITAL | Age: 45
End: 2025-08-08
Payer: MEDICAID

## 2025-08-08 VITALS
OXYGEN SATURATION: 98 % | SYSTOLIC BLOOD PRESSURE: 154 MMHG | TEMPERATURE: 99 F | RESPIRATION RATE: 18 BRPM | HEART RATE: 78 BPM | DIASTOLIC BLOOD PRESSURE: 84 MMHG

## 2025-08-08 DIAGNOSIS — D50.0 IRON DEFICIENCY ANEMIA DUE TO CHRONIC BLOOD LOSS: Primary | ICD-10-CM

## 2025-08-08 PROCEDURE — 63600175 PHARM REV CODE 636 W HCPCS: Performed by: INTERNAL MEDICINE

## 2025-08-08 PROCEDURE — 96374 THER/PROPH/DIAG INJ IV PUSH: CPT

## 2025-08-08 RX ORDER — SODIUM FERRIC GLUCONATE COMPLEX IN SUCROSE 12.5 MG/ML
125 INJECTION INTRAVENOUS
Status: CANCELLED | OUTPATIENT
Start: 2025-08-08

## 2025-08-08 RX ORDER — SODIUM CHLORIDE 0.9 % (FLUSH) 0.9 %
10 SYRINGE (ML) INJECTION
Status: DISCONTINUED | OUTPATIENT
Start: 2025-08-08 | End: 2025-08-08 | Stop reason: HOSPADM

## 2025-08-08 RX ORDER — HEPARIN 100 UNIT/ML
500 SYRINGE INTRAVENOUS
OUTPATIENT
Start: 2025-08-08

## 2025-08-08 RX ORDER — SODIUM CHLORIDE 0.9 % (FLUSH) 0.9 %
10 SYRINGE (ML) INJECTION
OUTPATIENT
Start: 2025-08-08

## 2025-08-08 RX ORDER — EPINEPHRINE 0.3 MG/.3ML
0.3 INJECTION SUBCUTANEOUS ONCE AS NEEDED
Status: DISCONTINUED | OUTPATIENT
Start: 2025-08-08 | End: 2025-08-08 | Stop reason: HOSPADM

## 2025-08-08 RX ORDER — EPINEPHRINE 0.3 MG/.3ML
0.3 INJECTION SUBCUTANEOUS ONCE AS NEEDED
OUTPATIENT
Start: 2025-08-08

## 2025-08-08 RX ORDER — SODIUM FERRIC GLUCONATE COMPLEX IN SUCROSE 12.5 MG/ML
125 INJECTION INTRAVENOUS
Status: COMPLETED | OUTPATIENT
Start: 2025-08-08 | End: 2025-08-08

## 2025-08-08 RX ORDER — HEPARIN 100 UNIT/ML
500 SYRINGE INTRAVENOUS
Status: DISCONTINUED | OUTPATIENT
Start: 2025-08-08 | End: 2025-08-08 | Stop reason: HOSPADM

## 2025-08-08 RX ADMIN — SODIUM FERRIC GLUCONATE COMPLEX IN SUCROSE 125 MG: 12.5 INJECTION INTRAVENOUS at 03:08

## 2025-08-08 NOTE — PLAN OF CARE
Patient tolerated Ferrlecit # 8 without incident. Vitals stable. PIV inserted & flushed with blood return present, saline locked & catheter removed at d/c. Declined 30 minute observation. Patient uses [facility] portal to track appointment. Stable and ambulatory off of unit at d/c.

## 2025-08-11 ENCOUNTER — LAB VISIT (OUTPATIENT)
Dept: LAB | Facility: HOSPITAL | Age: 45
End: 2025-08-11
Payer: MEDICAID

## 2025-08-11 DIAGNOSIS — D50.0 IRON DEFICIENCY ANEMIA DUE TO CHRONIC BLOOD LOSS: ICD-10-CM

## 2025-08-11 LAB
ABSOLUTE EOSINOPHIL (OHS): 0.11 K/UL
ABSOLUTE MONOCYTE (OHS): 0.9 K/UL (ref 0.3–1)
ABSOLUTE NEUTROPHIL COUNT (OHS): 4.69 K/UL (ref 1.8–7.7)
BASOPHILS # BLD AUTO: 0.02 K/UL
BASOPHILS NFR BLD AUTO: 0.3 %
ERYTHROCYTE [DISTWIDTH] IN BLOOD BY AUTOMATED COUNT: 20.9 % (ref 11.5–14.5)
FERRITIN SERPL-MCNC: 170 NG/ML (ref 20–300)
HCT VFR BLD AUTO: 36 % (ref 37–48.5)
HGB BLD-MCNC: 11.8 GM/DL (ref 12–16)
IMM GRANULOCYTES # BLD AUTO: 0.01 K/UL (ref 0–0.04)
IMM GRANULOCYTES NFR BLD AUTO: 0.1 % (ref 0–0.5)
IRON SATN MFR SERPL: 22 % (ref 20–50)
IRON SERPL-MCNC: 72 UG/DL (ref 30–160)
LYMPHOCYTES # BLD AUTO: 1.85 K/UL (ref 1–4.8)
MCH RBC QN AUTO: 28.8 PG (ref 27–31)
MCHC RBC AUTO-ENTMCNC: 32.8 G/DL (ref 32–36)
MCV RBC AUTO: 88 FL (ref 82–98)
NUCLEATED RBC (/100WBC) (OHS): 0 /100 WBC
PLATELET # BLD AUTO: 302 K/UL (ref 150–450)
PMV BLD AUTO: 10.1 FL (ref 9.2–12.9)
RBC # BLD AUTO: 4.1 M/UL (ref 4–5.4)
RELATIVE EOSINOPHIL (OHS): 1.5 %
RELATIVE LYMPHOCYTE (OHS): 24.4 % (ref 18–48)
RELATIVE MONOCYTE (OHS): 11.9 % (ref 4–15)
RELATIVE NEUTROPHIL (OHS): 61.8 % (ref 38–73)
TIBC SERPL-MCNC: 332 UG/DL (ref 250–450)
TRANSFERRIN SERPL-MCNC: 224 MG/DL (ref 200–375)
WBC # BLD AUTO: 7.58 K/UL (ref 3.9–12.7)

## 2025-08-11 PROCEDURE — 84466 ASSAY OF TRANSFERRIN: CPT

## 2025-08-11 PROCEDURE — 36415 COLL VENOUS BLD VENIPUNCTURE: CPT | Mod: PO

## 2025-08-11 PROCEDURE — 85025 COMPLETE CBC W/AUTO DIFF WBC: CPT

## 2025-08-11 PROCEDURE — 82728 ASSAY OF FERRITIN: CPT

## (undated) DEVICE — SEE MEDLINE ITEM 154981

## (undated) DEVICE — SEE MEDLINE ITEM 157117

## (undated) DEVICE — ELECTRODE REM PLYHSV RETURN 9

## (undated) DEVICE — BANDAGE ADHESIVE

## (undated) DEVICE — TROCAR ENDOPATH XCEL 11MM 10CM

## (undated) DEVICE — SUT 3/0 18IN COATED VICRYLP

## (undated) DEVICE — UNDERGLOVES BIOGEL PI SZ 7 LF

## (undated) DEVICE — SEE MEDLINE ITEM 157181

## (undated) DEVICE — SUPPORT ULNA NERVE PROTECTOR

## (undated) DEVICE — PACK LAPAROSCOPY/PELVISCOPY II

## (undated) DEVICE — CATH SELF-CATH FEMALE 14FR 6IN

## (undated) DEVICE — SOL 9P NACL IRR PIC IL

## (undated) DEVICE — SOL NS 1000CC

## (undated) DEVICE — TROCAR ENDOPATH XCEL 5X100MM

## (undated) DEVICE — SEE L#152161

## (undated) DEVICE — GLOVE SURGICAL LATEX SZ 7

## (undated) DEVICE — PAD PREP 50/CA

## (undated) DEVICE — SEE MEDLINE ITEM 156946

## (undated) DEVICE — DRESSING ADH ISLAND 2.5 X 3

## (undated) DEVICE — BAG TISS RETRV MONARCH 10MM

## (undated) DEVICE — SUT CTD VICRYL 3-0 PS-2 UND

## (undated) DEVICE — PAD SANITARY OB STERILE

## (undated) DEVICE — IRRIGATOR ENDOSCOPY DISP.

## (undated) DEVICE — SYR 10CC LUER LOCK

## (undated) DEVICE — UTERINE MANIPULATOR HUMI 6003

## (undated) DEVICE — DEVICE N-SEAL LAPROSCOPIC

## (undated) DEVICE — NDL INSUF ULTRA VERESS 120MM

## (undated) DEVICE — KIT ANTIFOG

## (undated) DEVICE — TUBING INSUFFLATION 10

## (undated) DEVICE — CLOSURE SKIN STERI STRIP 1/2X4

## (undated) DEVICE — BLANKET UPPER BODY 78.7X29.9IN